# Patient Record
Sex: FEMALE | Race: WHITE | NOT HISPANIC OR LATINO | Employment: FULL TIME
[De-identification: names, ages, dates, MRNs, and addresses within clinical notes are randomized per-mention and may not be internally consistent; named-entity substitution may affect disease eponyms.]

---

## 2017-04-07 DIAGNOSIS — G35 MULTIPLE SCLEROSIS (H): ICD-10-CM

## 2017-04-07 NOTE — TELEPHONE ENCOUNTER
Received faxed refill request for Rebif from [facility]; Patient was last seen in February 2016 and has no follow up appointment with Dr. Sheffield; Refilled for 6 months per [place] refill protocol. Will have clinic coordinators call to schedule appointment.    Roxana Hooks MS RN Care Coordinator

## 2017-08-19 ENCOUNTER — HEALTH MAINTENANCE LETTER (OUTPATIENT)
Age: 37
End: 2017-08-19

## 2017-08-24 ENCOUNTER — OFFICE VISIT (OUTPATIENT)
Dept: PODIATRY | Facility: CLINIC | Age: 37
End: 2017-08-24
Payer: COMMERCIAL

## 2017-08-24 VITALS
WEIGHT: 180 LBS | DIASTOLIC BLOOD PRESSURE: 88 MMHG | SYSTOLIC BLOOD PRESSURE: 136 MMHG | BODY MASS INDEX: 33.99 KG/M2 | HEIGHT: 61 IN

## 2017-08-24 DIAGNOSIS — M79.671 BILATERAL FOOT PAIN: Primary | ICD-10-CM

## 2017-08-24 DIAGNOSIS — M79.672 BILATERAL FOOT PAIN: Primary | ICD-10-CM

## 2017-08-24 PROCEDURE — 99203 OFFICE O/P NEW LOW 30 MIN: CPT | Performed by: PODIATRIST

## 2017-08-24 NOTE — PATIENT INSTRUCTIONS
DR. WEBER'S CLINIC LOCATIONS:   MONDAY - [place]  TUESDAY - [place]   [address] [facility]  [address]   [place] [address] [place] [address]   [phone]       THURSDAY AM - [place] [facility]   [address]   [place] [address] [place], [place] [address] [phone]       FRIDAY AM - [place] SET UP SURGERY: [phone] [address] APPOINTMENTS: [phone]   [place] [address] AFTER HOURS: [phone] FAX NUMBER: [phone]     Follow Up: 3 wks    PRICE THERAPY    Many aches and pains throughout the foot and ankle can be helped with many simple treatments. This is usually described as PRICE Therapy.      P - Protection - often times, inflammation/pain in the lower extremity is not able to improve simply because the areas involved are never allowed to rest. Every step we take can bother the problematic area. Protecting those areas is an important step in the healing process. This may involve a walking cast boot, a special insert/orthotic device, an ankle brace, or simply avoiding barefoot walking.    R - Rest - in addition to protecting the foot/ankle, resting is an important, but often times difficult, treatment option. Getting off your feet when they bother you, and specifically avoiding activities that cause pain/discomfort, are very beneficial to prevent, and treat, foot/ankle pain.      I - Ice - icing regularly can help to decrease inflammation and swelling in the foot, thus decreasing pain. Using an ice pack or a bag of frozen veggies works very well. Ice for 20 minutes multiple times per day as needed.  Do not place the ice directly on the skin as this can cause tissue damage.    C - Compression - using a compression wrap or an ACE wrap can help to decrease swelling, which can help to decrease pain. Wearing the wraps is generally not needed at night, but they should be worn on a  regular basis when you are going to be on your feet for prolonged periods as gravity tends to pull fluids down to your feet/ankles.    E - Elevation - elevating your lower extremities multiple times daily for 15-20 minutes can help to decrease swelling, which works well in decreasing pain levels.    NSAID/Tylenol - Anti-inflammatories like Aleve or ibuprofen, and/or a pain medication, such as Tylenol, can help to improve pain levels and get the issue resolved sooner rather than later. Anyone with liver issues should be careful with Tylenol, and anyone with high blood pressure or heart, stomach or kidney issues should be careful with anti-inflammatories. Please ask if you have questions about these medications, including dosage.      Over the Counter Inserts    Super Feet are the most common and easiest to find.    Locations include any Pocket Change Card, takokat in [place] on [address]  and in [place] on [address], [employer] in [facility] on [facility], [facility] Running Room in [facility] on [address], [facility] Running Room in [place] on [address] [facility] [address] [place] [address], Scary Mommy in [place] on [address] and [employer] Sport Shop in [facility] on [place] and in [place] on [facility].    Sofsole Fit can be found at takokat in [place].  You can also find them online at Sofsole.com    Spenco can be found online and at [employer] in [facility] on [address], Run N' Fun in [facility] on [place], Gear Running Store in [place] on [facility], [employer] in [place] on [address] and [employer] in [place] on [address].    Power Step can be a little harder to find.  Locations include [employer]' Fun in [place] on [address], [place] in [facility], Stop-over Store in [place] on [address] and online    Walk-Fit - Target     [facility]    **  A good high quality over the counter insert can cost around  $40-$50.              Body Mass Index (BMI)  Many things can cause foot and ankle problems. Foot structure, activity level, foot mechanics and injuries are common causes of pain. One very important issue that often goes unmentioned, is body weight. Extra weight can cause increased stress on muscles, ligaments, bones and tendons. Sometimes just a few extra pounds is all it takes to put one over her/his threshold. Without reducing that stress, it can be difficult to alleviate pain. Some people are uncomfortable addressing this issue, but we feel it is important for you to think about it. As Foot &  Ankle specialists, our job is addressing the lower extremity problem and possible causes. Regarding extra body weight, we encourage patients to discuss diet and weight management plans with their primary care doctors. It is this team approach that gives you the best opportunity for pain relief and getting you back on your feet.

## 2017-08-24 NOTE — MR AVS SNAPSHOT
After Visit Summary   8/24/2017    Alejandra Forbes    MRN: [id number]           Patient Information     Date Of Birth          1980        Visit Information        Provider Department      8/24/2017 3:15 PM Lane Serrato DPM [facility]        Care Instructions      DR. SERRATO'S CLINIC LOCATIONS:   MONDAY - [place] TUESDAY - [place]   [address] [facility]  [address]   [place] [address] [place] [address]   [phone]       THURSDAY AM - [place] THURSDAY PM - [facility]   [address]   [place] [address] [place], [place] [address] [phone]       FRIDAY AM - [place] SET UP SURGERY: [phone] [address] APPOINTMENTS: [phone]   [place] [address] AFTER HOURS: [phone] FAX NUMBER: [phone]     Follow Up: 3 wks    PRICE THERAPY    Many aches and pains throughout the foot and ankle can be helped with many simple treatments. This is usually described as PRICE Therapy.      P - Protection - often times, inflammation/pain in the lower extremity is not able to improve simply because the areas involved are never allowed to rest. Every step we take can bother the problematic area. Protecting those areas is an important step in the healing process. This may involve a walking cast boot, a special insert/orthotic device, an ankle brace, or simply avoiding barefoot walking.    R - Rest - in addition to protecting the foot/ankle, resting is an important, but often times difficult, treatment option. Getting off your feet when they bother you, and specifically avoiding activities that cause pain/discomfort, are very beneficial to prevent, and treat, foot/ankle pain.      I - Ice - icing regularly can help to decrease inflammation and swelling in the foot, thus decreasing pain. Using an ice pack or a bag of frozen veggies works very well. Ice for 20 minutes multiple times  per day as needed.  Do not place the ice directly on the skin as this can cause tissue damage.    C - Compression - using a compression wrap or an ACE wrap can help to decrease swelling, which can help to decrease pain. Wearing the wraps is generally not needed at night, but they should be worn on a regular basis when you are going to be on your feet for prolonged periods as gravity tends to pull fluids down to your feet/ankles.    E - Elevation - elevating your lower extremities multiple times daily for 15-20 minutes can help to decrease swelling, which works well in decreasing pain levels.    NSAID/Tylenol - Anti-inflammatories like Aleve or ibuprofen, and/or a pain medication, such as Tylenol, can help to improve pain levels and get the issue resolved sooner rather than later. Anyone with liver issues should be careful with Tylenol, and anyone with high blood pressure or heart, stomach or kidney issues should be careful with anti-inflammatories. Please ask if you have questions about these medications, including dosage.      Over the Counter Inserts    Super Feet are the most common and easiest to find.    Locations include any [employer], [employer] in [place] on [place] [address] and in [place] on [facility] [address], [employer] Resources in \[facility]\"" on [facility], [facility] Running Room in \[facility]\"" on [address], [facility] Running Room in [place] on [address], [employer] in [place] on [address] and [employer] Sport Shop in \[facility]\"" on [place] and in [place] on [address].    Sofsole Fit can be found at [employer] in [place].  You can also find them online at Sofsole.com    Spenco can be found online and at Mod[employer]ir[employer] Shoe Shop in \[facility]\"" on [address], Run N' Fun in [facility] on [address], Gear Running Store in [place] on [facility], [employer] in [place] on [address] and [employer] in [place] on [address].    Power Step can be a little  harder to find.  Locations include Run N' Fun in [place] on [address], [place] in [employer], Stop-over Store in [place] on [address] and online    Walk-Fit - Target     [facility] - [facility]    **  A good high quality over the counter insert can cost around $40-$50.              Body Mass Index (BMI)  Many things can cause foot and ankle problems. Foot structure, activity level, foot mechanics and injuries are common causes of pain. One very important issue that often goes unmentioned, is body weight. Extra weight can cause increased stress on muscles, ligaments, bones and tendons. Sometimes just a few extra pounds is all it takes to put one over her/his threshold. Without reducing that stress, it can be difficult to alleviate pain. Some people are uncomfortable addressing this issue, but we feel it is important for you to think about it. As Foot &  Ankle specialists, our job is addressing the lower extremity problem and possible causes. Regarding extra body weight, we encourage patients to discuss diet and weight management plans with their primary care doctors. It is this team approach that gives you the best opportunity for pain relief and getting you back on your feet.            Follow-ups after your visit        Who to contact     If you have questions or need follow up information about today's clinic visit or your schedule please contact [facility] directly at [phone].  Normal or non-critical lab and imaging results will be communicated to you by MyChart, letter or phone within 4 business days after the clinic has received the results. If you do not hear from us within 7 days, please contact the clinic through MyChart or phone. If you have a critical or abnormal lab result, we will notify you by phone as soon as possible.  Submit refill requests through [employer] or call your pharmacy and they will forward the refill request to us. Please allow 3 business days for your  "refill to be completed.          Additional Information About Your Visit        [employer] Information     [employer] lets you send messages to your doctor, view your test results, renew your prescriptions, schedule appointments and more. To sign up, go to [email or web address] . Click on \"Log in\" on the left side of the screen, which will take you to the Welcome page. Then click on \"Sign up Now\" on the right side of the page.     You will be asked to enter the access code listed below, as well as some personal information. Please follow the directions to create your username and password.     Your access code is: [email or web address]  Expires: 2017  3:41 PM     Your access code will  in 90 days. If you need help or a new code, please call your [place] clinic or [phone].        Care EveryWhere ID     This is your Care EveryWhere ID. This could be used by other organizations to access your [facility] medical records  [id number]        Your Vitals Were     Height BMI (Body Mass Index)                5' 1\" (1.549 m) 34.01 kg/m2           Blood Pressure from Last 3 Encounters:   16 143/90   16 (!) 135/92   08/28/15 116/68    Weight from Last 3 Encounters:   17 180 lb (81.6 kg)   16 180 lb (81.6 kg)   16 180 lb (81.6 kg)              Today, you had the following     No orders found for display       Primary Care Provider Office Phone # Fax #    Shivani Clemons [phone] [address] [facility] [address]        Equal Access to Services     [facility]: Hadii deshawn Valerio, дмитрий thomas, qarandolphta nenaalnilo williamson. So [facility] [phone].    ATENCIÓN: Si habla español, tiene a murphy disposición servicios gratuitos de asistencia lingüística. Llame al [phone].    We comply with applicable federal civil rights laws and [place] laws. We do not discriminate on the basis of race, color, national " origin, age, disability sex, sexual orientation or gender identity.            Thank you!     Thank you for choosing [facility]  for your care. Our goal is always to provide you with excellent care. Hearing back from our patients is one way we can continue to improve our services. Please take a few minutes to complete the written survey that you may receive in the mail after your visit with us. Thank you!             Your Updated Medication List - Protect others around you: Learn how to safely use, store and throw away your medicines at www.disposemymeds.org.          This list is accurate as of: 8/24/17  3:41 PM.  Always use your most recent med list.                   Brand Name Dispense Instructions for use Diagnosis    buPROPion 150 MG 24 hr tablet    WELLBUTRIN XL    30 tablet    Take 300 mg by mouth every morning    Bipolar 2 disorder (H)       cholecalciferol 1000 UNIT tablet    vitamin D    90 tablet    Take one tablet daily, starting after you have finished the once a week dosing of the 50,000 Units dose.    MS (multiple sclerosis) (H)       ibuprofen 200 MG tablet    ADVIL/MOTRIN     Take 2-3 tablets by mouth as needed        Interferon Beta-1a 44 MCG/0.5ML Soaj     12 Syringe    Inject 44 mcg Subcutaneous three times a week    Multiple sclerosis (H)       lithium 300 MG CR tablet    ESKALITH/LITHOBID     Take 1 tablet (300 mg) by mouth 2 times daily        metFORMIN 500 MG 24 hr tablet    GLUCOPHAGE-XR    180 tablet    TAKE 2 TABLETS BY MOUTH DAILY WITH A MEAL    Hyperglycemia, drug-induced       norgestrel-ethinyl estradiol 0.3-30 MG-MCG per tablet    LO/OVRAL    90 tablet    Take 1 tablet by mouth daily    Other general counseling and advice for contraceptive management       order for DME     1 Device    Equipment being ordered: nebulizer with tubing    Wheezing       zolpidem 5 MG tablet    AMBIEN    30 tablet    Take 1 tablet (5 mg) by mouth nightly as needed for sleep    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

## 2017-08-24 NOTE — NURSING NOTE
"Chief Complaint   Patient presents with     Foot Problems     BL foot and ankle pain x many years       Initial /88  Ht 5' 1\" (1.549 m)  Wt 180 lb (81.6 kg)  BMI 34.01 kg/m2 Estimated body mass index is 34.01 kg/(m^2) as calculated from the following:    Height as of this encounter: 5' 1\" (1.549 m).    Weight as of this encounter: 180 lb (81.6 kg).  Medication Reconciliation: complete  "

## 2017-09-01 NOTE — PROGRESS NOTES
"Foot & Ankle Surgery  August 24th, 2017    CC: bilateral foot pain    I was asked to see Alejandra Forbes regarding the chief complaint by:  self    HPI:  Pt is a 37 year old female who presents with above complaint.  Bilateral \"pain in both left and right foot, ankle and top\" for \"several years\".  Describes ache, sometimes a sharp pain or stiffness.  Pain 4/10 \"daily\", jesus with \"over use, it usually hurts more after sitting or not moving for a while\".  Has tried ibuprofen which did help.  symptoms R>L.  Describes \"tightness\" in her feet.    ROS:   Pos for CC.  The patient denies current nausea, vomiting, chills, fevers, belly pain, calf pain, chest pain or SOB.  Complete remainder of ROS is otherwise neg.    VITALS:    Vitals:    08/24/17 1521   BP: 136/88   Weight: 180 lb (81.6 kg)   Height: 5' 1\" (1.549 m)       PMH:    Past Medical History:   Diagnosis Date     Allergic rhinitis, cause unspecified     no meds taken, rare flonase     Anxiety attack 8/28/2015     Asthma, exercise induced 3/1/2013     Intermittent asthma 3/1/2013    UPPER RESPIRATIRY TRACT INFECTION induced      Mild intermittent asthma     Exercise induced, & cold -better w/ weight loss, albuterol use rare     Pars planitis     steroid inj txt gave glaucoma, surgery to treat gave too low pressures and optive nerve swelling- needs more surgery     Polycystic ovaries 2003    Better on OCPs and metformin for insulin resistance, last a1c 5.7     Posterior subcapsular polar cataract, nonsenile     steroid induced.     Steroid responders      Uveitis        SXHX:    Past Surgical History:   Procedure Laterality Date     C NONSPECIFIC PROCEDURE  4/06, 7/06    glaucoma surgery of both eyes due to steroid txt of pars planaris     CATARACT IOL, RT/LT Left 2006     GLAUCOMA SURGERY Bilateral 2006     YAG CAPSULOTOMY OS (LEFT EYE) Left 09/13        MEDS:    Current Outpatient Prescriptions   Medication     Interferon Beta-1a 44 MCG/0.5ML SOAJ     metFORMIN " (GLUCOPHAGE-XR) 500 MG 24 hr tablet     lithium (ESKALITH/LITHOBID) 300 MG CR tablet     buPROPion (WELLBUTRIN XL) 150 MG 24 hr tablet     zolpidem (AMBIEN) 5 MG tablet     ORDER FOR DME     ibuprofen (ADVIL,MOTRIN) 200 MG tablet     norgestrel-ethinyl estradiol (LO/OVRAL) 0.3-30 MG-MCG per tablet     cholecalciferol (VITAMIN D) 1000 UNIT tablet     No current facility-administered medications for this visit.        ALL:     Allergies   Allergen Reactions     Nkda [No Known Drug Allergies]        FMH:    Family History   Problem Relation Age of Onset     DIABETES Father      Severe Type 2     Alcohol/Drug Father      Allergies Father      Depression Father      Bipolar     Obesity Father      Blood Disease Father      passed away from septic shock in 6/07     DIABETES Paternal Grandfather      Type 2     Depression Paternal Grandfather      Allergies Brother      Eye Disorder Paternal Grandmother      Glaucoma, Cataracts     OSTEOPOROSIS Paternal Grandmother      Glaucoma Paternal Grandmother      Lipids Maternal Grandmother      CANCER Paternal Aunt      lung, smoker     Breast Cancer Mother 60     Macular Degeneration No family hx of        SocHx:    Social History     Social History     Marital status:      Spouse name: N/A     Number of children: 0     Years of education: Bachelors+     Occupational History     , Director Of Orientation      [employer]     Social History Main Topics     Smoking status: Never Smoker     Smokeless tobacco: Never Used     Alcohol use No      Comment: 0-1 Drinks Per Week     Drug use: No     Sexual activity: Yes     Partners: Female, Male     Birth control/ protection: Condom, Pill      Comment: Safe, stable relationship, male- on lo-ovral     Other Topics Concern      Service No     Blood Transfusions No     Caffeine Concern No     Occupational Exposure No     Hobby Hazards No     Sleep Concern No     Stress Concern No     Weight Concern Yes     Special  Diet No     Back Care No     Exercise Yes     Bike Helmet Yes     Seat Belt Yes     Self-Exams No     Social History Narrative    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    Mutually monogamous relationship (chris kebede teacher), getting  June 2014.    Shivani Clemons                 3/1/2013 4:38 PM                Social Documentation:        Balanced Diet: YES    Calcium intake: more than 2 per day    Caffeine: 10 cups per day    Exercise:  type of activity just joined [facility];  3 times per week    Sunscreen: Yes    Seatbelts:  Yes    Self Breast Exam:  No    Self Testicular Exam: n/a    Physical/Emotional/Sexual Abuse: No    Do you feel safe in your environment? Yes        Cholesterol screen up to date: No    CHOL      163   4/17/06    HDL       36   4/17/06    LDL      109   4/17/06    TRIG       86   4/17/06    CHOLHDLRATIO      4.5   4/17/06        Eye Exam up to date: Would like to discuss, having a lot of issues    Dental Exam up to date: No    Pap smear up to date: Yes-doing today    Mammogram up to date: Does Not Apply    Dexa Scan up to date: Does Not Apply    Colonoscopy up to date: Does Not Apply    Immunizations up to date: Yes-td 2005    Glucose screen if over 40:  No                                   EXAMINATION:  Gen:   No apparent distress  Neuro:   A&Ox3, no deficits  Psych:    Answering questions appropriately for age and situation with normal affect  Head:    NCAT  Eye:    Visual scanning without deficit  Ear:    Response to auditory stimuli wnl  Lung:    Non-labored breathing on RA noted  Abd:    NTND per patient report  Lymph:    Neg for pitting/non-pitting edema BLE  Vasc:    Pulses palpable, CFT minimally delayed  Neuro:    Light touch sensation intact to all sensory nerve distributions without paresthesias  Derm:    Neg for nodules, lesions or ulcerations  MSK:    Unable to elicit any pain today.  No met, intermet, sinus tarsi, midfoot or MPJ pain on  exam. WB exam shows calcaneal valgus with decrease in arch height bilateral   Calf:    Neg for redness, swelling or tenderness    Assessment:  37 year old female with bilateral foot pain, although unable to elicit specific symptoms today      Plan:  Discussed etiologies and options  1.  Bilateral foot pain  -comfortable shoes, minimize shoeless ambulation  -OTC insert  -RICE/NSAID prn    Follow up:  3 weeks or sooner with acute issues      Patient's medical history was reviewed today    Body mass index is 34.01 kg/(m^2).  Weight management plan: Patient was referred to their PCP to discuss a diet and exercise plan.        Lane Serrato DPM   Podiatric Foot & Ankle Surgeon  [facility]  [phone]

## 2017-10-31 ENCOUNTER — OFFICE VISIT (OUTPATIENT)
Dept: OPHTHALMOLOGY | Facility: CLINIC | Age: 37
End: 2017-10-31
Attending: OPHTHALMOLOGY
Payer: COMMERCIAL

## 2017-10-31 DIAGNOSIS — H35.353 CYSTOID MACULAR EDEMA OF BOTH EYES: Primary | ICD-10-CM

## 2017-10-31 DIAGNOSIS — G35: ICD-10-CM

## 2017-10-31 DIAGNOSIS — H30.23: ICD-10-CM

## 2017-10-31 DIAGNOSIS — H40.43X4 UVEITIC GLAUCOMA OF BOTH EYES, INDETERMINATE STAGE: ICD-10-CM

## 2017-10-31 DIAGNOSIS — H20.9 UVEITIC GLAUCOMA OF BOTH EYES, INDETERMINATE STAGE: ICD-10-CM

## 2017-10-31 PROCEDURE — 92133 CPTRZD OPH DX IMG PST SGM ON: CPT | Mod: ZF | Performed by: INTERNAL MEDICINE

## 2017-10-31 PROCEDURE — 92015 DETERMINE REFRACTIVE STATE: CPT | Mod: ZF

## 2017-10-31 PROCEDURE — 99214 OFFICE O/P EST MOD 30 MIN: CPT | Mod: ZF

## 2017-10-31 PROCEDURE — 92134 CPTRZ OPH DX IMG PST SGM RTA: CPT | Mod: ZF | Performed by: INTERNAL MEDICINE

## 2017-10-31 ASSESSMENT — CUP TO DISC RATIO
OD_RATIO: 0.2
OS_RATIO: 0.2

## 2017-10-31 ASSESSMENT — CONF VISUAL FIELD
METHOD: COUNTING FINGERS
OD_NORMAL: 1
OS_NORMAL: 1

## 2017-10-31 ASSESSMENT — SLIT LAMP EXAM - LIDS
COMMENTS: NORMAL
COMMENTS: NORMAL

## 2017-10-31 ASSESSMENT — REFRACTION_MANIFEST
OS_CYLINDER: +5.00
OS_SPHERE: -2.75
OD_SPHERE: -3.50
OD_ADD: +1.00
OS_AXIS: 090
OD_AXIS: 005
OD_CYLINDER: +0.50
OS_ADD: +2.50

## 2017-10-31 ASSESSMENT — TONOMETRY
IOP_METHOD: TONOPEN
OD_IOP_MMHG: 15
OS_IOP_MMHG: 15

## 2017-10-31 ASSESSMENT — EXTERNAL EXAM - RIGHT EYE: OD_EXAM: NORMAL

## 2017-10-31 ASSESSMENT — VISUAL ACUITY
OS_SC: 20/200
OD_PH_SC: 20/50-2
OS_PH_SC: 20/50+2
METHOD: SNELLEN - LINEAR
OD_SC: 20/125

## 2017-10-31 ASSESSMENT — EXTERNAL EXAM - LEFT EYE: OS_EXAM: NORMAL

## 2017-10-31 NOTE — PROGRESS NOTES
HPI  Alejandra Forbes is a 37 year old female with history of multiple sclerosis with history of uveitis, left eye who presents today for CEE and refraction. Patient reports she broke her glasses over the weekend which is why she is requesting a new pair. She denies change in vision. Eyes are comfortable without pain.       Medications:  No ocular medications  Interferon gamma     Testing:   OCT Macula both eyes:  OD mild epiretinal membrane with some mild retinal distortion.   OS: central cystoid macular edema,mild epiretinal membrane    Assessment & Plan    (H20.9) Anterior uveitis  (primary encounter diagnosis)  (H30.22) Pars planitis of left eye    1) Intermediate uveitis left eye   -Patient with new cystoid macular edema in her left eye today on testing. Not seen on previous testing 2015.   - previous history of ocular steroid and oral prednisone use with steroid response.     Plan:   -Discussed findings of new intraretinal swelling with patient and options for treatment. Patient not interested in treatment at this time as vision is stable (20/30) left eye. She would like to observe in setting of side effects.   -Observe, return to clinic 3 months with repeat oct macula to check on edema         2)Uveitic vs steroid responder Glaucoma -- s/p Trab OU in 2006 (Dr. Edward), +steroid responder -- K pachy:    Tmax: 60s per patient (on steroid prior to Trab OU)    HVF: Full in 2009     CDR:     HRT/OCT: RNFL WNL In 2015      FHX of Glc: Yes, grandmother on gtts     Gonio:       Intolerant to:      Asthma/COPD: Yes, on inhalers  Steroid Use: Yes, for CME    Kidney Stones:  No   Sulfa Allergy:  No    IOP targets:  -IOP in good range s/p trabeculectomy both eyes     3)PCIOL left eye   4)PSC OD- visually significant patient would like to observe.   6)Multiple Sclerosis -- following with Neurology  7)H/O ERM -- following with Dr. Xu YANG  Ophthalmology PGY3    Attending Physician Attestation:  Complete  documentation of historical and exam elements from today's encounter can be found in the full encounter summary report (not reduplicated in this progress note).  I personally obtained the chief complaint(s) and history of present illness.  I confirmed and edited as necessary the review of systems, past medical/surgical history, family history, social history, and examination findings as documented by others; and I examined the patient myself.  I personally reviewed the relevant tests, images, and reports as documented above.  I formulated and edited as necessary the assessment and plan and discussed the findings and management plan with the patient and family. Attending Physician Image/Tesing Attestation: I personally reviewed the ophthalmic test(s) associated with this encounter, agree with the interpretation(s) as documented by the resident/fellow, and have edited the corresponding report(s) as necessary.  . - Kevin Deleon MD

## 2017-10-31 NOTE — NURSING NOTE
Chief Complaints and History of Present Illnesses   Patient presents with     Uveitis Follow Up     Glaucoma Follow Up     HPI    Affected eye(s):  Both   Symptoms:     Blurred vision   No decreased vision   No floaters   No flashes   No redness   No Dryness         Do you have eye pain now?:  No      Comments:  Last eye exam was here. No recent changes or complaints. Needs new glasses. No drops.   Sabina SMITH October 31, 2017 8:20 AM  s

## 2017-10-31 NOTE — MR AVS SNAPSHOT
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      Your next 10 appointments already scheduled     Feb 02, 2018  8:00 AM CST   RETURN GENERAL with Danya Pagan MD   Eye Clinic ([facility])    [address] [address]  [address] [facility]  9[address] Clin [address] [place] [id number] [phone]              Who to contact     Please call your clinic at [phone] to:    Ask questions about your health    Make or cancel appointments    Discuss your medicines    Learn about your test results    Speak to your doctor   If you have compliments or concerns about an experience at your clinic, or if you wish to file a complaint, please contact [facility] Patient Relations at [phone] or email us at [email or web address]         Additional Information About Your Visit        MyChart Information     Conelum is an electronic gateway that provides easy, online access to your medical records. With Conelum, you can request a clinic appointment, read your test results, renew a prescription or communicate with your care team.     To sign up for GetFresht visit the website at [email or web address]   You will be asked to enter the access code listed below, as well as some personal information. Please follow the directions to create your username and password.     Your access code is: [email or web address]  Expires: 11/22/2017   3:41 PM     Your access code will  in 90 days. If you need help or a new code, please contact your [facility] or call [phone] for assistance.        Care EveryWhere ID     This is your Care EveryWhere ID. This could be used by other organizations to access your [facility] medical records  [id number]         Blood Pressure from Last 3 Encounters:   17 136/88   16 143/90   16 (!) 135/92    Weight from Last 3 Encounters:   17 81.6 kg (180 lb)   16 81.6 kg (180 lb)   16 81.6 kg (180 lb)              We Performed the Following     OCT Optic Nerve RNFL Spectralis OU (both eyes)     OCT Retina Spectralis OU (both eyes)        Primary Care Provider Office Phone # Fax #    Shivani Nuha Clemons [phone] [address] [facility] [address]        Equal Access to Services     DAVION BLUM : Hadii aad ku hadasho Soomaali, waaxda luqadaha, qaybta kaalmada adeegyada, waxay idiin hayaan ceasar ramos . So [facility] [phone].    ATENCIÓN: Si habla español, tiene a murphy disposición servicios gratuitos de asistencia lingüística. Llame al [phone].    We comply with applicable federal civil rights laws and [place] laws. We do not discriminate on the basis of race, color, national origin, age, disability, sex, sexual orientation, or gender identity.            Thank you!     Thank you for choosing EYE CLINIC  for your care. Our goal is always to provide you with excellent care. Hearing back from our patients is one way we can continue to improve our services. Please take a few minutes to complete the written survey that you may receive in the mail after your visit with us. Thank you!             Your Updated Medication List - Protect others around you: Learn how to safely use, store and throw away your medicines at www.disposemymeds.org.          This list is accurate as of: 10/31/17 11:59 PM.  Always use your most  recent med list.                   Brand Name Dispense Instructions for use Diagnosis    buPROPion 150 MG 24 hr tablet    WELLBUTRIN XL    30 tablet    Take 300 mg by mouth every morning    Bipolar 2 disorder (H)       cholecalciferol 1000 UNIT tablet    vitamin D3    90 tablet    Take one tablet daily, starting after you have finished the once a week dosing of the 50,000 Units dose.    MS (multiple sclerosis) (H)       ibuprofen 200 MG tablet    ADVIL/MOTRIN     Take 2-3 tablets by mouth as needed        Interferon Beta-1a 44 MCG/0.5ML Soaj     12 Syringe    Inject 44 mcg Subcutaneous three times a week    Multiple sclerosis (H)       lithium 300 MG CR tablet    ESKALITH/LITHOBID     Take 1 tablet (300 mg) by mouth 2 times daily        metFORMIN 500 MG 24 hr tablet    GLUCOPHAGE-XR    180 tablet    TAKE 2 TABLETS BY MOUTH DAILY WITH A MEAL    Hyperglycemia, drug-induced       norgestrel-ethinyl estradiol 0.3-30 MG-MCG per tablet    LO/OVRAL    90 tablet    Take 1 tablet by mouth daily    Other general counseling and advice for contraceptive management       order for DME     1 Device    Equipment being ordered: nebulizer with tubing    Wheezing       zolpidem 5 MG tablet    AMBIEN    30 tablet    Take 1 tablet (5 mg) by mouth nightly as needed for sleep    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

## 2017-12-22 DIAGNOSIS — T50.905A HYPERGLYCEMIA, DRUG-INDUCED: ICD-10-CM

## 2017-12-22 DIAGNOSIS — E28.2 POLYCYSTIC OVARIES: ICD-10-CM

## 2017-12-22 DIAGNOSIS — R73.9 HYPERGLYCEMIA, DRUG-INDUCED: ICD-10-CM

## 2017-12-26 RX ORDER — METFORMIN HCL 500 MG
TABLET, EXTENDED RELEASE 24 HR ORAL
Qty: 30 TABLET | Refills: 0 | Status: CANCELLED | OUTPATIENT
Start: 2017-12-26

## 2017-12-26 RX ORDER — METFORMIN HCL 500 MG
TABLET, EXTENDED RELEASE 24 HR ORAL
Start: 2017-12-26

## 2018-01-02 NOTE — TELEPHONE ENCOUNTER
Routing refill request to provider for review/approval because:  A break in medication  See below messages  Cherelle ADAN RN    Next 5 appointments (look out 90 days)     Jan 09, 2018  4:00 PM CST   PHYSICAL with Shivani Clemons MD   [facility]    [address]  [facility] [address] [phone]

## 2018-01-03 RX ORDER — METFORMIN HCL 500 MG
TABLET, EXTENDED RELEASE 24 HR ORAL
Qty: 60 TABLET | Refills: 0 | Status: SHIPPED | OUTPATIENT
Start: 2018-01-03 | End: 2018-01-15

## 2018-01-15 ENCOUNTER — OFFICE VISIT (OUTPATIENT)
Dept: FAMILY MEDICINE | Facility: CLINIC | Age: 38
End: 2018-01-15
Payer: COMMERCIAL

## 2018-01-15 VITALS
SYSTOLIC BLOOD PRESSURE: 145 MMHG | BODY MASS INDEX: 44.48 KG/M2 | WEIGHT: 235.6 LBS | DIASTOLIC BLOOD PRESSURE: 92 MMHG | HEIGHT: 61 IN | TEMPERATURE: 98 F | HEART RATE: 77 BPM | OXYGEN SATURATION: 100 %

## 2018-01-15 DIAGNOSIS — R03.0 ELEVATED BLOOD PRESSURE READING WITHOUT DIAGNOSIS OF HYPERTENSION: ICD-10-CM

## 2018-01-15 DIAGNOSIS — R73.9 HYPERGLYCEMIA, DRUG-INDUCED: ICD-10-CM

## 2018-01-15 DIAGNOSIS — F31.81 BIPOLAR 2 DISORDER (H): ICD-10-CM

## 2018-01-15 DIAGNOSIS — F45.0 SEVERE SOMATOFORM DISORDER: ICD-10-CM

## 2018-01-15 DIAGNOSIS — T50.905A HYPERGLYCEMIA, DRUG-INDUCED: ICD-10-CM

## 2018-01-15 DIAGNOSIS — Z00.00 ROUTINE GENERAL MEDICAL EXAMINATION AT A HEALTH CARE FACILITY: Primary | ICD-10-CM

## 2018-01-15 DIAGNOSIS — Z12.4 SCREENING FOR CERVICAL CANCER: ICD-10-CM

## 2018-01-15 DIAGNOSIS — H30.23: ICD-10-CM

## 2018-01-15 DIAGNOSIS — G35 MULTIPLE SCLEROSIS (H): ICD-10-CM

## 2018-01-15 DIAGNOSIS — Z23 NEED FOR VACCINATION: ICD-10-CM

## 2018-01-15 DIAGNOSIS — G35: ICD-10-CM

## 2018-01-15 LAB
ERYTHROCYTE [DISTWIDTH] IN BLOOD BY AUTOMATED COUNT: 13.4 % (ref 10–15)
HBA1C MFR BLD: 7.1 % (ref 4.3–6)
HCT VFR BLD AUTO: 46.4 % (ref 35–47)
HGB BLD-MCNC: 15 G/DL (ref 11.7–15.7)
MCH RBC QN AUTO: 29.7 PG (ref 26.5–33)
MCHC RBC AUTO-ENTMCNC: 32.3 G/DL (ref 31.5–36.5)
MCV RBC AUTO: 92 FL (ref 78–100)
PLATELET # BLD AUTO: 293 10E9/L (ref 150–450)
RBC # BLD AUTO: 5.05 10E12/L (ref 3.8–5.2)
WBC # BLD AUTO: 8.4 10E9/L (ref 4–11)

## 2018-01-15 PROCEDURE — 99213 OFFICE O/P EST LOW 20 MIN: CPT | Mod: 25 | Performed by: FAMILY MEDICINE

## 2018-01-15 PROCEDURE — 80053 COMPREHEN METABOLIC PANEL: CPT | Performed by: FAMILY MEDICINE

## 2018-01-15 PROCEDURE — 90686 IIV4 VACC NO PRSV 0.5 ML IM: CPT | Performed by: FAMILY MEDICINE

## 2018-01-15 PROCEDURE — 36415 COLL VENOUS BLD VENIPUNCTURE: CPT | Performed by: FAMILY MEDICINE

## 2018-01-15 PROCEDURE — 99395 PREV VISIT EST AGE 18-39: CPT | Mod: 25 | Performed by: FAMILY MEDICINE

## 2018-01-15 PROCEDURE — 90471 IMMUNIZATION ADMIN: CPT | Performed by: FAMILY MEDICINE

## 2018-01-15 PROCEDURE — 84443 ASSAY THYROID STIM HORMONE: CPT | Performed by: FAMILY MEDICINE

## 2018-01-15 PROCEDURE — 82306 VITAMIN D 25 HYDROXY: CPT | Performed by: FAMILY MEDICINE

## 2018-01-15 PROCEDURE — 85027 COMPLETE CBC AUTOMATED: CPT | Performed by: FAMILY MEDICINE

## 2018-01-15 PROCEDURE — 83036 HEMOGLOBIN GLYCOSYLATED A1C: CPT | Performed by: FAMILY MEDICINE

## 2018-01-15 NOTE — MR AVS SNAPSHOT
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     Discuss preventive care.      Review your medicines if you your doctor has prescribed any.    Until age 30: Get a Pap test every three years (more often if you have had an abnormal result).    After age 30: Talk to your doctor about whether you should have a Pap test every 3 years or have a Pap test with HPV screening every 5 years.   You do not need a Pap test if your uterus was removed (hysterectomy) and you have not had cancer.  You should be tested each year for STDs (sexually transmitted diseases), if you're at risk.   Talk to your provider about how often to have your cholesterol checked.  If you are at risk for diabetes, you should have a diabetes test (fasting glucose).  Shots: Get a flu shot each year. Get a tetanus shot every 10 years.   Nutrition:     Eat at least 5 servings of fruits and vegetables each day.    Eat whole-grain bread, whole-wheat pasta and brown rice instead of white grains and rice.    Talk to your provider about Calcium and Vitamin D.     Lifestyle    Exercise at least 150 minutes a week (30 minutes a day, 5 days of the week). This will help  you control your weight and prevent disease.    Limit alcohol to one drink per day.    No smoking.     Wear sunscreen to prevent skin cancer.    See your dentist every six months for an exam and cleaning.            Follow-ups after your visit        Additional Services     MENTAL HEALTH REFERRAL  - Adult; Psychiatry and Medication Management; Psychiatry; [facility]: Psychiatry Clinic [phone]; We will contact you to schedule the appointment or please call with any questions       All scheduling is subject to the client's specific insurance plan & benefits, provider/location availability, and provider clinical specialities.  Please arrive 15 minutes early for your first appointment and bring your completed paperwork.    Please be aware that coverage of these services is subject to the terms and limitations of your health insurance plan.  Call member services at your health plan with any benefit or coverage questions.                            Your next 10 appointments already scheduled     Feb 02, 2018  8:00 AM CST   RETURN GENERAL with Danya Pagan MD   Eye Clinic ([facility])    [facility]  [address] [facility]  [address] [address] [place] [id number] [phone]              Who to contact     If you have questions or need follow up information about today's clinic visit or your schedule please contact [facility] directly at [phone].  Normal or non-critical lab and imaging results will be communicated to you by MyChart, letter or phone within 4 business days after the clinic has received the results. If you do not hear from us within 7 days, please contact the clinic through MyChart or phone. If you have a critical or abnormal lab result, we will notify you by phone as soon as possible.  Submit refill requests through Freebee or call your pharmacy and they will forward the refill request to us. Please allow 3 business days for your refill to be completed.          Additional  "Information About Your Visit        MyChart Information     [employer] lets you send messages to your doctor, view your test results, renew your prescriptions, schedule appointments and more. To sign up, go to [email or web address] . Click on \"Log in\" on the left side of the screen, which will take you to the Welcome page. Then click on \"Sign up Now\" on the right side of the page.     You will be asked to enter the access code listed below, as well as some personal information. Please follow the directions to create your username and password.     Your access code is: [email or web address]  Expires: 4/15/2018  4:15 PM     Your access code will  in 90 days. If you need help or a new code, please call your [place] clinic or [phone].        Care EveryWhere ID     This is your Care EveryWhere ID. This could be used by other organizations to access your [place] medical records  [id number]        Your Vitals Were     Pulse Temperature Height Last Period Pulse Oximetry BMI (Body Mass Index)    77 98  F (36.7  C) (Oral) 5' 1\" (1.549 m) 2018 (Exact Date) 100% 44.52 kg/m2       Blood Pressure from Last 3 Encounters:   01/15/18 (!) 145/92   17 136/88   16 143/90    Weight from Last 3 Encounters:   01/15/18 235 lb 9.6 oz (106.9 kg)   17 180 lb (81.6 kg)   16 180 lb (81.6 kg)              We Performed the Following     CBC with platelets     Comprehensive metabolic panel     Hemoglobin A1c     MENTAL HEALTH REFERRAL  - Adult; Psychiatry and Medication Management; Psychiatry; [facility]: Psychiatry Clinic [phone]; We will contact you to schedule the appointment or please call with any questions     TSH with free T4 reflex     Vitamin D Deficiency          Today's Medication Changes          These changes are accurate as of: 1/15/18  4:15 PM.  If you have any questions, ask your nurse or doctor.               Stop taking these medicines if you haven't already. Please contact your care team if you " have questions.     buPROPion 150 MG 24 hr tablet   Commonly known as:  WELLBUTRIN XL   Stopped by:  Shivani Clemons MD           lithium 300 MG CR tablet   Commonly known as:  ESKALITH/LITHOBID   Stopped by:  Shivani Clemons MD           metFORMIN 500 MG 24 hr tablet   Commonly known as:  GLUCOPHAGE-XR   Stopped by:  Shivani Clemons MD                    Primary Care Provider Office Phone # Fax #    Shivani Clemons [phone] [address] [facility] [address]        Equal Access to Services     [facility]: Hadii deshawn payne hadasho Soomaali, waaxda luqadaha, qaybta kaalmada ademiguel, nilo ramos . So [facility] [phone].    ATENCIÓN: Si habla español, tiene a murphy disposición servicios gratuitos de asistencia lingüística. Ll[facility]ier [facility] [phone].    We comply with applicable federal civil rights laws and [place] laws. We do not discriminate on the basis of race, color, national origin, age, disability, sex, sexual orientation, or gender identity.            Thank you!     Thank you for choosing [facility]  for your care. Our goal is always to provide you with excellent care. Hearing back from our patients is one way we can continue to improve our services. Please take a few minutes to complete the written survey that you may receive in the mail after your visit with us. Thank you!             Your Updated Medication List - Protect others around you: Learn how to safely use, store and throw away your medicines at www.disposemymeds.org.          This list is accurate as of: 1/15/18  4:15 PM.  Always use your most recent med list.                   Brand Name Dispense Instructions for use Diagnosis    cholecalciferol 1000 UNIT tablet    vitamin D3    90 tablet    Take one tablet daily, starting after you have finished the once a week dosing of the 50,000 Units dose.    MS (multiple sclerosis) (H)       ibuprofen 200 MG tablet     ADVIL/MOTRIN     Take 2-3 tablets by mouth as needed        Interferon Beta-1a 44 MCG/0.5ML Soaj     12 Syringe    Inject 44 mcg Subcutaneous three times a week    Multiple sclerosis (H)       norgestrel-ethinyl estradiol 0.3-30 MG-MCG per tablet    LO/OVRAL    90 tablet    Take 1 tablet by mouth daily    Other general counseling and advice for contraceptive management       order for DME     1 Device    Equipment being ordered: nebulizer with tubing    Wheezing       zolpidem 5 MG tablet    AMBIEN    30 tablet    Take 1 tablet (5 mg) by mouth nightly as needed for sleep    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

## 2018-01-15 NOTE — PROGRESS NOTES
"   SUBJECTIVE:   CC: Alejandra Forbes is an 37 year old woman who presents for preventive health visit.   She presents today with her , she is crying but is consolable easily she reports she does not believe in Western medication as they only treat the symptom without causing complete healing.  She reports she stopped taking all medication  was on lithium- stopped on own - summer 2017- was taking 600-900 mg a dayand would like certain blood test, levels interbody checked.  She declined any medications today.  She has long-standing history of bipolar disorder -she reports Dr Quiros- Sherrie jain- was giving lithium for bipolar  Disorder  She reports she is not able take lithium daily as her body does not feel good on it- so now has not taken any for several months-   reports without medications- a lot of anxiety and a lot of energy inside & when she returns from  Work- lays down- and starts having convulsions-she reports sh has control over the convulsions as they come only when lays down- and she allows her body enegry to come out as convulsion- she is totally aware of it and can talk while its happening- and feels intense energy being relived and when its done- it feels better. There is usually a point that she knows that energy has run up , what needs to do and she can get up and fix dinner and rest of the evening    She stopped all medications as the medications hightening the issues of excess energy  Has been taking  xanax - it clams her down and makes things manageable    She reports few night ago, was unable to sleep and used an Ambien, xanax and 5 alcoholic drinks to help her sleep  She denies drugs or alcohol abuse and  concurs     She described at length a spiritual experience that happened years ago.  She calls it reyna energy and experienced it at meditation treat-was practicing loving kindess meditation and changed the words for \"may I receive love\" and had energy on the " word receive and felt energy through the base of the spine and filled all the way up and shooting up through the crown of the head- & since then that energy has not left her    She reports she has been  Using sugar and food to calm down the energy & hence the weight gain    She Wants blood tests to  know about chemicals inside the body & wants all   Metabolic and thyroid testing done    Has been on interferon- has still taken it once in a while  Physical   Annual:     Getting at least 3 servings of Calcium per day::  NO    Bi-annual eye exam::  Yes    Dental care twice a year::  NO    Sleep apnea or symptoms of sleep apnea::  None    Diet::  Regular (no restrictions)    Taking medications regularly::  Not Applicable    Additional concerns today::  YES                  PROBLEMS TO ADD ON...    Today's PHQ-2 Score:   PHQ-2 ( 1999 [employer]) 1/15/2018   Q1: Little interest or pleasure in doing things 1   Q2: Feeling down, depressed or hopeless 1   PHQ-2 Score 2   Q1: Little interest or pleasure in doing things Several days   Q2: Feeling down, depressed or hopeless Several days   PHQ-2 Score 2       Abuse: Current or Past(Physical, Sexual or Emotional)- No  Do you feel safe in your environment - Yes    Social History   Substance Use Topics     Smoking status: Never Smoker     Smokeless tobacco: Never Used     Alcohol use No      Comment: 0-1 Drinks Per Week     Alcohol Use 1/15/2018   If you drink alcohol, do you typically have greater than 3 drinks per day OR greater than 7 drinks per week?   Not applicable       Reviewed orders with patient.  Reviewed health maintenance and updated orders accordingly - Yes  BP Readings from Last 3 Encounters:   01/15/18 (!) 145/92   08/24/17 136/88   02/23/16 143/90    Wt Readings from Last 3 Encounters:   01/15/18 235 lb 9.6 oz (106.9 kg)   08/24/17 180 lb (81.6 kg)   02/23/16 180 lb (81.6 kg)                      Patient under age 50, mutual decision reflected in health  "maintenance.        Pertinent mammograms are reviewed under the imaging tab.  History of abnormal Pap smear: NO - age 30-65 PAP every 5 years with negative HPV co-testing recommended    Reviewed and updated as needed this visit by clinical staff  Tobacco  Allergies  Meds         Reviewed and updated as needed this visit by Provider  Meds          Review of Systems  C: NEGATIVE for fever, chills, change in weight  I: NEGATIVE for worrisome rashes, moles or lesions  E: NEGATIVE for vision changes or irritation  ENT: NEGATIVE for ear, mouth and throat problems  R: NEGATIVE for significant cough or SOB  B: NEGATIVE for masses, tenderness or discharge  CV: NEGATIVE for chest pain, palpitations or peripheral edema  GI: NEGATIVE for nausea, abdominal pain, heartburn, or change in bowel habits  : NEGATIVE for unusual urinary or vaginal symptoms. Periods are regular.  M: NEGATIVE for significant arthralgias or myalgia  N: NEGATIVE for weakness, dizziness or paresthesias  P: NEGATIVE for changes in mood or affect     OBJECTIVE:   BP (!) 145/92  Pulse 77  Temp 98  F (36.7  C) (Oral)  Ht 5' 1\" (1.549 m)  Wt 235 lb 9.6 oz (106.9 kg)  LMP 01/12/2018 (Exact Date)  SpO2 100%  BMI 44.52 kg/m2. Strong body odour  Physical Exam  GENERAL: healthy, alert and no distress  EYES: Eyes grossly normal to inspection, PERRL and conjunctivae and sclerae normal  HENT: ear canals and TM's normal, nose and mouth without ulcers or lesions  NECK: no adenopathy, no asymmetry, masses, or scars and thyroid normal to palpation  RESP: lungs clear to auscultation - no rales, rhonchi or wheezes  BREAST: normal without masses, tenderness or nipple discharge and no palpable axillary masses or adenopathy  CV: regular rate and rhythm, normal S1 S2, no S3 or S4, no murmur, click or rub, no peripheral edema and peripheral pulses strong  ABDOMEN: soft, nontender, no hepatosplenomegaly, no masses and bowel sounds normal   (female): normal female " external genitalia, normal urethral meatus, vaginal mucosa pink, moist, well rugated, and normal cervix/adnexa/uterus without masses or discharge  MS: no gross musculoskeletal defects noted, no edema  SKIN: no suspicious lesions or rashes  NEURO: Normal strength and tone, mentation intact and speech normal  PSYCH: tearful, anxious, fatigued and appearance disheveled    ASSESSMENT/PLAN:   (Z00.00) Routine general medical examination at a health care facility  (primary encounter diagnosis  Plan: Please see patient instructions . Family planning- she has been on oral contraceptive pills- and stopped on and off- advised for home urine pregnancy test if periods irregular. enocuarged consistent use to prevent unintended pregnancy    (G35) Multiple sclerosis (H)  Comment: interferone on and off  stopped seeing neurologist  Plan: encouarged to follow up with neurologist  She reports no new neurological symptoms     (F31.81) Bipolar 2 disorder (HCC)  Plan: Complex mental health, she has stopped all medications.  She is unwilling and not ready to resume medication.  We had a long discussion about the need to follow-up with psychiatrist and continue with counseling.  She is willing to resume the counseling.  Her  reports she is safe at home.  She probably has conversion disorder/somatoform -the convulsions, that  she described in detail make her mental history more complex and the need for medication management more urgent  She is willing to get her referral for mental-health counseling, meanwhile try to reach her previous counselors again- monie griffith, psycoglogist  She is encouraged follow-up in 2 weeks especially if unable to resume counseling  We will check basic and comprehensive metabolic panel thyroid blood test     TSH with free T4 reflex, Comprehensive         metabolic panel, CBC with platelets, Vitamin D         Deficiency, MENTAL HEALTH REFERRAL  - Adult;         Psychiatry and Medication Management;     "     Psychiatry; [facility]: Psychiatry Clinic [phone]; We will contact you to schedule the         appointment or please call with any questions      (H20.13,  G35) 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)  Plan: encouraged opthalmology follow up     (R73.9,  T50.595A) Hyperglycemia, drug-induced  Plan: Hemoglobin A1c         (R03.0) Elevated blood pressure reading without diagnosis of hypertension  Plan: discussed life style changes that seem hard for her to folow due to on going mental health issues    (Z12.4) Screening for cervical cancer  Comment: LMP-10/13/18- patient declined pap today  Plan: CANCELED: Pap imaged thin layer screen with HPV        - recommended age 30 - 65 years (select HPV         order below)    (Z23) Need for vaccination  Plan: HC FLU VAC PRESRV FREE QUAD SPLIT VIR 3+YRS IM    In addition to the preventive visit, 15  minutes of the appointment were spent evaluating and developing a treatment plan for she additional concern(s) as above.        COUNSELING:  Reviewed preventive health counseling, as reflected in patient instructions       Regular exercise       Healthy diet/nutrition         reports that she has never smoked. She has never used smokeless tobacco.    Estimated body mass index is 44.52 kg/(m^2) as calculated from the following:    Height as of this encounter: 5' 1\" (1.549 m).    Weight as of this encounter: 235 lb 9.6 oz (106.9 kg).         Counseling Resources:  ATP IV Guidelines  Pooled Cohorts Equation Calculator  Breast Cancer Risk Calculator  FRAX Risk Assessment  ICSI Preventive Guidelines  Dietary Guidelines for Americans, 2010  [employer]'s MyPlate  ASA Prophylaxis  Lung CA Screening    Shivani Clemons MD  [facility] CLINICAnswers for HPI/ROS submitted by the patient on 1/15/2018   PHQ-2 Score: 2    "

## 2018-01-16 LAB
ALBUMIN SERPL-MCNC: 3.8 G/DL (ref 3.4–5)
ALP SERPL-CCNC: 93 U/L (ref 40–150)
ALT SERPL W P-5'-P-CCNC: 18 U/L (ref 0–50)
ANION GAP SERPL CALCULATED.3IONS-SCNC: 9 MMOL/L (ref 3–14)
AST SERPL W P-5'-P-CCNC: 14 U/L (ref 0–45)
BILIRUB SERPL-MCNC: 0.2 MG/DL (ref 0.2–1.3)
BUN SERPL-MCNC: 14 MG/DL (ref 7–30)
CALCIUM SERPL-MCNC: 8.8 MG/DL (ref 8.5–10.1)
CHLORIDE SERPL-SCNC: 106 MMOL/L (ref 94–109)
CO2 SERPL-SCNC: 25 MMOL/L (ref 20–32)
CREAT SERPL-MCNC: 0.53 MG/DL (ref 0.52–1.04)
GFR SERPL CREATININE-BSD FRML MDRD: >90 ML/MIN/1.7M2
GLUCOSE SERPL-MCNC: 105 MG/DL (ref 70–99)
POTASSIUM SERPL-SCNC: 3.7 MMOL/L (ref 3.4–5.3)
PROT SERPL-MCNC: 7.5 G/DL (ref 6.8–8.8)
SODIUM SERPL-SCNC: 140 MMOL/L (ref 133–144)
TSH SERPL DL<=0.005 MIU/L-ACNC: 2.58 MU/L (ref 0.4–4)

## 2018-01-17 LAB — DEPRECATED CALCIDIOL+CALCIFEROL SERPL-MC: 22 UG/L (ref 20–75)

## 2018-01-29 ENCOUNTER — OFFICE VISIT (OUTPATIENT)
Dept: FAMILY MEDICINE | Facility: CLINIC | Age: 38
End: 2018-01-29
Payer: COMMERCIAL

## 2018-01-29 VITALS
WEIGHT: 234.8 LBS | HEART RATE: 107 BPM | OXYGEN SATURATION: 97 % | BODY MASS INDEX: 44.37 KG/M2 | SYSTOLIC BLOOD PRESSURE: 132 MMHG | RESPIRATION RATE: 16 BRPM | TEMPERATURE: 96.5 F | DIASTOLIC BLOOD PRESSURE: 80 MMHG

## 2018-01-29 DIAGNOSIS — E66.01 MORBID OBESITY (H): ICD-10-CM

## 2018-01-29 DIAGNOSIS — T50.905A HYPERGLYCEMIA, DRUG-INDUCED: ICD-10-CM

## 2018-01-29 DIAGNOSIS — R73.9 HYPERGLYCEMIA, DRUG-INDUCED: ICD-10-CM

## 2018-01-29 DIAGNOSIS — G35 MULTIPLE SCLEROSIS (H): ICD-10-CM

## 2018-01-29 DIAGNOSIS — F31.81 BIPOLAR 2 DISORDER (H): ICD-10-CM

## 2018-01-29 DIAGNOSIS — Z12.4 SCREENING FOR CERVICAL CANCER: Primary | ICD-10-CM

## 2018-01-29 PROCEDURE — 87624 HPV HI-RISK TYP POOLED RSLT: CPT | Performed by: FAMILY MEDICINE

## 2018-01-29 PROCEDURE — 99214 OFFICE O/P EST MOD 30 MIN: CPT | Performed by: FAMILY MEDICINE

## 2018-01-29 PROCEDURE — G0145 SCR C/V CYTO,THINLAYER,RESCR: HCPCS | Performed by: FAMILY MEDICINE

## 2018-01-29 RX ORDER — METFORMIN HCL 500 MG
1000 TABLET, EXTENDED RELEASE 24 HR ORAL
Qty: 90 TABLET | Refills: 1 | Status: SHIPPED | OUTPATIENT
Start: 2018-01-29 | End: 2018-04-13

## 2018-01-29 NOTE — MR AVS SNAPSHOT
After Visit Summary   1/29/2018 April FRANCIS Forbes    MRN: [id number]           Patient Information     Date Of Birth          1980        Visit Information        Provider Department      1/29/2018 4:00 PM Shivani Clemons MD [facility]        Today's Diagnoses     Screening for cervical cancer    -  1    Hyperglycemia, drug-induced        Bipolar 2 disorder (HCC)        Multiple sclerosis (H)          Care Instructions    metformin 2 tabs once daily  Avid fatty foods, sugar, carb's  See neurologist            Follow-ups after your visit        Additional Services     NEUROLOGY ADULT REFERRAL       Your provider has referred you for the following:   [place] [facility] [facility] [phone]   [email or web address]  [facility] [facility] [phone]   [email or web address]  [place] [phone]   [email or web address]    Please be aware that coverage of these services is subject to the terms and limitations of your health insurance plan.  Call member services at your health plan with any benefit or coverage questions.      Please bring the following with you to your appointment:    (1) Any X-Rays, CTs or MRIs which have been performed.  Contact the facility where they were done to arrange for  prior to your scheduled appointment.    (2) List of current medications  (3) This referral request   (4) Any documents/labs given to you for this referral                  Your next 10 appointments already scheduled     Feb 02, 2018  8:00 AM CST   RETURN GENERAL with Danya Pagan MD   Eye Clinic ([facility])    [address]  [facility] [address] [phone]              Future tests that were ordered for you today     Open Future Orders        Priority Expected  "Expires Ordered    **A1C FUTURE 3mo Routine 2018    Glucose Routine  2019            Who to contact     If you have questions or need follow up information about today's clinic visit or your schedule please contact [facility] directly at [phone].  Normal or non-critical lab and imaging results will be communicated to you by MyChart, letter or phone within 4 business days after the clinic has received the results. If you do not hear from us within 7 days, please contact the clinic through [employer] or phone. If you have a critical or abnormal lab result, we will notify you by phone as soon as possible.  Submit refill requests through [employer] or call your pharmacy and they will forward the refill request to us. Please allow 3 business days for your refill to be completed.          Additional Information About Your Visit        [facility] [employer] Information     [employer] lets you send messages to your doctor, view your test results, renew your prescriptions, schedule appointments and more. To sign up, go to [email or web address] . Click on \"Log in\" on the left side of the screen, which will take you to the Welcome page. Then click on \"Sign up Now\" on the right side of the page.     You will be asked to enter the access code listed below, as well as some personal information. Please follow the directions to create your username and password.     Your access code is: [email or web address]  Expires: 4/15/2018  4:15 PM     Your access code will  in 90 days. If you need help or a new code, please call your [place] clinic or [phone].        Care EveryWhere ID     This is your Care EveryWhere ID. This could be used by other organizations to access your [place] medical records  [id number]        Your Vitals Were     Pulse Temperature Respirations Last Period Pulse Oximetry BMI (Body Mass Index)    107 96.5  F (35.8  C) (Oral) 16 2018 (Exact Date) 97% 44.37 kg/m2       " Blood Pressure from Last 3 Encounters:   01/29/18 132/80   01/15/18 (!) 145/92   08/24/17 136/88    Weight from Last 3 Encounters:   01/29/18 234 lb 12.8 oz (106.5 kg)   01/15/18 235 lb 9.6 oz (106.9 kg)   08/24/17 180 lb (81.6 kg)              We Performed the Following     HPV High Risk Types DNA Cervical     NEUROLOGY ADULT REFERRAL     Pap imaged thin layer screen with HPV - recommended age 30 - 65 years (select HPV order below)          Today's Medication Changes          These changes are accurate as of 1/29/18  4:23 PM.  If you have any questions, ask your nurse or doctor.               Start taking these medicines.        Dose/Directions    metFORMIN 500 MG 24 hr tablet   Commonly known as:  GLUCOPHAGE-XR   Used for:  Hyperglycemia, drug-induced   Started by:  Shivani Clemons MD        Dose:  1000 mg   Take 2 tablets (1,000 mg) by mouth daily (with dinner)   Quantity:  90 tablet   Refills:  1            Where to get your medicines      These medications were sent to [place] [facility] [address] [facility] - [place], [place] - [address], [place] [address]     Phone:  [phone]     metFORMIN 500 MG 24 hr tablet                Primary Care Provider Office Phone # Fax #    Shivani Clemons [phone] [address] [facility] [address]        Equal Access to Services     [facility]: Delilah Valerio, waaxda luqadaha, qaybta kaalmaclaude burns, nilo ramos . So [facility] [phone].    ATENCIÓN: Si habla español, tiene a murphy disposición servicios gratuitos de asistencia lingüística. Llame al [phone].    We comply with applicable federal civil rights laws and [place] laws. We do not discriminate on the basis of race, color, national origin, age, disability, sex, sexual orientation, or gender identity.            Thank you!     Thank you for choosing [facility]  for your care. Our goal is always to provide  you with excellent care. Hearing back from our patients is one way we can continue to improve our services. Please take a few minutes to complete the written survey that you may receive in the mail after your visit with us. Thank you!             Your Updated Medication List - Protect others around you: Learn how to safely use, store and throw away your medicines at www.disposemymeds.org.          This list is accurate as of 1/29/18  4:23 PM.  Always use your most recent med list.                   Brand Name Dispense Instructions for use Diagnosis    cholecalciferol 1000 UNIT tablet    vitamin D3    90 tablet    Take one tablet daily, starting after you have finished the once a week dosing of the 50,000 Units dose.    MS (multiple sclerosis) (H)       ibuprofen 200 MG tablet    ADVIL/MOTRIN     Take 2-3 tablets by mouth as needed        Interferon Beta-1a 44 MCG/0.5ML Soaj     12 Syringe    Inject 44 mcg Subcutaneous three times a week    Multiple sclerosis (H)       metFORMIN 500 MG 24 hr tablet    GLUCOPHAGE-XR    90 tablet    Take 2 tablets (1,000 mg) by mouth daily (with dinner)    Hyperglycemia, drug-induced       norgestrel-ethinyl estradiol 0.3-30 MG-MCG per tablet    LO/OVRAL    90 tablet    Take 1 tablet by mouth daily    Other general counseling and advice for contraceptive management       order for DME     1 Device    Equipment being ordered: nebulizer with tubing    Wheezing       zolpidem 5 MG tablet    AMBIEN    30 tablet    Take 1 tablet (5 mg) by mouth nightly as needed for sleep    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

## 2018-01-29 NOTE — LETTER
February 7, 2018 April FRANCIS Forbes  [address]    [place] [address]    Dear April,  We are happy to inform you that your PAP smear result from 01/29/18 is normal.  We are now able to do a follow up test on PAP smears. The DNA test is for HPV (Human Papilloma Virus). Cervical cancer is closely linked with certain types of HPV. Your result showed no evidence of high risk HPV.  Therefore we recommend you return in 3 years for your next pap smear.  You will still need to return to the clinic every year for an annual exam and other preventive tests.  Please contact the clinic at [phone] with any questions.  Sincerely,    Shivani Clemons MD/bebe

## 2018-01-29 NOTE — PROGRESS NOTES
Chief complaints/HPI      Alejandra Forbes is a 37 year old female presents with following concerns.  CC:1. Pap smear- had periods on the day of annual physical   2. Discuss labs- had been on perdinsone for a very long time for inflammation in eyes. Had been on metformin for hyperglycemia- stopped all medications -on own- willing to resume again  3.Follow up about mental health.She reports she is doing much better  Has appointment with a new psychiatrist- Dr benson- has spoken with him directly on phone about her bipolar disorder- still not ready to start the medications   She reports the energy going through her body is changed and is much manageable and not having same volunteer type seizure as before  She has known history of MS- & has yet to make appointment for neurology- she stopped interferon on own        Current Outpatient Prescriptions   Medication     metFORMIN (GLUCOPHAGE-XR) 500 MG 24 hr tablet     Interferon Beta-1a 44 MCG/0.5ML SOAJ     zolpidem (AMBIEN) 5 MG tablet     ORDER FOR DME     ibuprofen (ADVIL,MOTRIN) 200 MG tablet     norgestrel-ethinyl estradiol (LO/OVRAL) 0.3-30 MG-MCG per tablet     cholecalciferol (VITAMIN D) 1000 UNIT tablet     No current facility-administered medications for this visit.        Past medical and family history,medications, allergies and problem list reviewed       ROS: 10 point ROS neg other than the symptoms noted above in the HPI.    EXAM:/80  Pulse 107  Temp 96.5  F (35.8  C) (Oral)  Resp 16  Wt 234 lb 12.8 oz (106.5 kg)  LMP 01/12/2018 (Exact Date)  SpO2 97%  BMI 44.37 kg/m2  Constitutional: healthy, alert and no distress   Cardiovascular: negative,  RRR. No murmurs, clicks gallops or rub  Respiratory:  Lungs clear.no wheezing, crackles.  Abdomen: Abdomen soft, non-tender.no organomegaly, No CVA tenderness.  Vagina and vulva are normal;  no discharge is noted.  Cervix normal without lesions. Uterus anteverted and mobile, normal in size and shape  without tenderness.  Adnexa normal in size without masses or tenderness. Pap Smear - is sent  NEURO: Gait normal. Reflexes normal and symmetric. Sensation grossly WNL.  Psychiatric: mentation appears normal and affect normal/bright    ASSESSMENT / PLAN:  (Z12.4) Screening for cervical cancer  (primary encounter diagnosis)  Plan: Pap imaged thin layer screen with HPV -         recommended age 30 - 65 years (select HPV order        below), HPV High Risk Types DNA Cervical          (R73.9,  T50.905A) Hyperglycemia, drug-induced  Plan: advised to resume metformin ER 1000 mg once daily  Recheck a1c and fasting glucose in 3 months  encouarged to watch diet- its been hard for her- as does admit to using food for comfort  Lab Results   Component Value Date    A1C 7.1 01/15/2018    A1C 7.3 06/08/2015    A1C 5.6 03/31/2011    A1C 5.7 07/03/2008    A1C 5.7 04/17/2006         History of  Bipolar 2 disorder (HCC)  Has appointment with  Dr Regan- this week  Feels better  No medication    Bipolar disorder  Plan: she stopped all medications on own  Has appointment with new psychiatrist this weekend  Reports feeling much better on own than before       Multiple sclerosis (H)  Plan: neurology referal given again    Morbid Obesity  Plan: counseled about limiting calorie & portion control  Its understandable that its been hard for her to follow due to mental health   Fasting lipid are elevated      The patient indicates understanding of these issues and agrees with the plan.      Shivani Clemons

## 2018-01-29 NOTE — NURSING NOTE
"Chief Complaint   Patient presents with     Gyn Exam     Initial /80  Pulse 107  Temp 96.5  F (35.8  C) (Oral)  Resp 16  Wt 234 lb 12.8 oz (106.5 kg)  LMP 01/12/2018 (Exact Date)  SpO2 97%  BMI 44.37 kg/m2 Estimated body mass index is 44.37 kg/(m^2) as calculated from the following:    Height as of 1/15/18: 5' 1\" (1.549 m).    Weight as of this encounter: 234 lb 12.8 oz (106.5 kg).  BP completed using cuff size: large. L arm       Health Maintenance that is potentially due pending provider review:   Pap smear is being completed today.      Shelley Brown CMA     "

## 2018-01-29 NOTE — PROGRESS NOTES
"  SUBJECTIVE:   Alejandra Forbes is a 37 year old female who presents to clinic today for the following health issues:      Pt is here today to complete pap smear. Physical exam completed on 1/15/2018    {additional problems for provider to add:670017}    Problem list and histories reviewed & adjusted, as indicated.  Additional history: {NONE - AS DOCUMENTED:953045::\"as documented\"}    {HIST REVIEW/ LINKS 2:712358}    Reviewed and updated as needed this visit by clinical staff  Tobacco  Allergies  Meds       Reviewed and updated as needed this visit by Provider         {PROVIDER CHARTING PREFERENCE:821552}  "

## 2018-01-30 NOTE — PROGRESS NOTES
Discussed with appointment today  Normal vitamin D level  Normal thyroid level  Normal complete blood count   Normal complete metabolic panel , that checks for  Liver & kidney functions  A1C is high- resume metformin- recheck in 3 months

## 2018-02-01 LAB
COPATH REPORT: NORMAL
PAP: NORMAL

## 2018-02-05 LAB
FINAL DIAGNOSIS: NORMAL
HPV HR 12 DNA CVX QL NAA+PROBE: NEGATIVE
HPV16 DNA SPEC QL NAA+PROBE: NEGATIVE
HPV18 DNA SPEC QL NAA+PROBE: NEGATIVE
SPECIMEN DESCRIPTION: NORMAL
SPECIMEN SOURCE CVX/VAG CYTO: NORMAL

## 2018-02-21 DIAGNOSIS — H35.353 CYSTOID MACULAR EDEMA OF BOTH EYES: Primary | ICD-10-CM

## 2018-02-23 ENCOUNTER — TELEPHONE (OUTPATIENT)
Dept: FAMILY MEDICINE | Facility: CLINIC | Age: 38
End: 2018-02-23

## 2018-02-23 NOTE — TELEPHONE ENCOUNTER
Attempt to reach patient.  Mail box is full.  Asking for Glucose monitor/test strips per message below.  How is she currently checking her BS?  Will try to call later.  Candace Wong RN

## 2018-02-23 NOTE — TELEPHONE ENCOUNTER
Reason for Call: Request for an order or referral:    Order or referral being requested: Because her A1C was high at her last Ov she has been talking her own BS  She is also on Metformin that she feels should be increased because her BS is still high in the AM   She would like a Glucose Metre and test strips      [id number] - [place], [place] - [address] [place] [address]      Date needed: as soon as possible    Has the patient been seen by the PCP for this problem? YES    Additional comments: Please call her if any questions    Phone number Patient can be reached at:  Home number on file [phone] (home)    Best Time:  anytime    Can we leave a detailed message on this number?  YES    Call taken on 2/23/2018 at 3:35 PM by Jessica Wynn

## 2018-04-13 DIAGNOSIS — T50.905A HYPERGLYCEMIA, DRUG-INDUCED: ICD-10-CM

## 2018-04-13 DIAGNOSIS — R73.9 HYPERGLYCEMIA, DRUG-INDUCED: ICD-10-CM

## 2018-04-16 RX ORDER — METFORMIN HCL 500 MG
TABLET, EXTENDED RELEASE 24 HR ORAL
Qty: 60 TABLET | Refills: 0 | Status: SHIPPED | OUTPATIENT
Start: 2018-04-16 | End: 2018-05-09

## 2018-04-16 NOTE — TELEPHONE ENCOUNTER
"Medication is being filled for 1 time refill only due to:  Patient needs labs A1c and glucose - see OV notes from AS 1/29/2018 - repeat labs in 3 months.   Cherelle ADAN RN    Requested Prescriptions   Pending Prescriptions Disp Refills     metFORMIN (GLUCOPHAGE-XR) 500 MG 24 hr tablet [Pharmacy Med Name: METFORMIN  MG TABLET] 90 tablet 1     Sig: TAKE 2 TABLETS (1,000 MG) BY MOUTH DAILY (WITH DINNER)    Biguanide Agents Passed    4/13/2018  5:20 PM       Passed - Blood pressure less than 140/90 in past 6 months    BP Readings from Last 3 Encounters:   01/29/18 132/80   01/15/18 (!) 145/92   08/24/17 136/88                Passed - Patient is age 10 or older       Passed - Recent (12 mo) or future (30 days) visit within the authorizing provider's specialty     Patient had office visit in the last 12 months or has a visit in the next 30 days with authorizing provider or within the authorizing provider's specialty.  See \"Patient Info\" tab in inbasket, or \"Choose Columns\" in Meds & Orders section of the refill encounter.           Passed - Patient does NOT have a diagnosis of CHF.       Passed - Patient is not pregnant       Passed - Patient has not had a positive pregnancy test within the past 12 mos.             "

## 2018-05-04 ENCOUNTER — TELEPHONE (OUTPATIENT)
Dept: FAMILY MEDICINE | Facility: CLINIC | Age: 38
End: 2018-05-04

## 2018-05-04 DIAGNOSIS — T50.905A HYPERGLYCEMIA, DRUG-INDUCED: ICD-10-CM

## 2018-05-04 DIAGNOSIS — R73.9 HYPERGLYCEMIA, DRUG-INDUCED: ICD-10-CM

## 2018-05-04 LAB — HBA1C MFR BLD: 6.5 % (ref 0–5.6)

## 2018-05-04 PROCEDURE — 36415 COLL VENOUS BLD VENIPUNCTURE: CPT | Performed by: FAMILY MEDICINE

## 2018-05-04 PROCEDURE — 83036 HEMOGLOBIN GLYCOSYLATED A1C: CPT | Performed by: FAMILY MEDICINE

## 2018-05-04 NOTE — PROGRESS NOTES
Dear Triage, I just left a voice message myself  She does need fasting glucose as well & I just called and left a voice message

## 2018-05-04 NOTE — TELEPHONE ENCOUNTER
----- Message from Shivani Clemons MD sent at 5/4/2018  9:11 AM CDT -----  Regarding: please add fasting glucose/ pt had lab only eunice today & i see A1C resulted only.  Dear lab  Please add fasting glucose- that was futured as well  She got A1C completed today but no fasting glucose    Dear Triage, if you can call lab- in case lab not able to review this message before the end of the day    Thanks all

## 2018-05-04 NOTE — TELEPHONE ENCOUNTER
AS,  FYI:  Patient informed of below  Will get fasting glucose Wed AM next week - seeing you Wed as well  States she cannot come back today  Cherelle ADAN RN

## 2018-05-04 NOTE — TELEPHONE ENCOUNTER
AS,  Lab cannot add glucose onto A1c that was drawn  She said wrong type of tube was collected for this  Juventino in lab states she asked pt if she was just here for A1c and patient said yes  Cherelle ADAN RN

## 2018-05-09 ENCOUNTER — OFFICE VISIT (OUTPATIENT)
Dept: FAMILY MEDICINE | Facility: CLINIC | Age: 38
End: 2018-05-09
Payer: COMMERCIAL

## 2018-05-09 VITALS
HEIGHT: 61 IN | BODY MASS INDEX: 44.42 KG/M2 | RESPIRATION RATE: 16 BRPM | DIASTOLIC BLOOD PRESSURE: 88 MMHG | HEART RATE: 95 BPM | SYSTOLIC BLOOD PRESSURE: 126 MMHG | WEIGHT: 235.3 LBS | TEMPERATURE: 98.1 F | OXYGEN SATURATION: 98 %

## 2018-05-09 DIAGNOSIS — Z30.41 FAMILY PLANNING, BCP (BIRTH CONTROL PILLS) MAINTENANCE: ICD-10-CM

## 2018-05-09 DIAGNOSIS — F31.81 BIPOLAR 2 DISORDER (H): ICD-10-CM

## 2018-05-09 DIAGNOSIS — G35 MULTIPLE SCLEROSIS (H): ICD-10-CM

## 2018-05-09 DIAGNOSIS — E11.65 TYPE 2 DIABETES MELLITUS WITH HYPERGLYCEMIA, WITHOUT LONG-TERM CURRENT USE OF INSULIN (H): Primary | ICD-10-CM

## 2018-05-09 DIAGNOSIS — F43.9 SITUATIONAL STRESS: ICD-10-CM

## 2018-05-09 DIAGNOSIS — E66.01 MORBID OBESITY (H): ICD-10-CM

## 2018-05-09 LAB
CHOLEST SERPL-MCNC: 151 MG/DL
CREAT UR-MCNC: <3 MG/DL
GLUCOSE SERPL-MCNC: 130 MG/DL (ref 70–99)
HDLC SERPL-MCNC: 39 MG/DL
LDLC SERPL CALC-MCNC: 96 MG/DL
MICROALBUMIN UR-MCNC: 16 MG/L
MICROALBUMIN/CREAT UR: NORMAL MG/G CR (ref 0–25)
NONHDLC SERPL-MCNC: 112 MG/DL
TRIGL SERPL-MCNC: 80 MG/DL

## 2018-05-09 PROCEDURE — 99214 OFFICE O/P EST MOD 30 MIN: CPT | Performed by: FAMILY MEDICINE

## 2018-05-09 PROCEDURE — 82043 UR ALBUMIN QUANTITATIVE: CPT | Performed by: FAMILY MEDICINE

## 2018-05-09 PROCEDURE — 82947 ASSAY GLUCOSE BLOOD QUANT: CPT | Performed by: FAMILY MEDICINE

## 2018-05-09 PROCEDURE — 99207 C FOOT EXAM  NO CHARGE: CPT | Performed by: FAMILY MEDICINE

## 2018-05-09 PROCEDURE — 80061 LIPID PANEL: CPT | Performed by: FAMILY MEDICINE

## 2018-05-09 RX ORDER — METFORMIN HCL 500 MG
1000 TABLET, EXTENDED RELEASE 24 HR ORAL 2 TIMES DAILY WITH MEALS
Qty: 120 TABLET | Refills: 3 | Status: SHIPPED | OUTPATIENT
Start: 2018-05-09 | End: 2018-09-04

## 2018-05-09 NOTE — PROGRESS NOTES
SUBJECTIVE:   Alejandra Forbes is a 38 year old female who presents to clinic today for the following health issues:          Has been on Glucophage 100 mg  Changed diet and trying to get some excercise  SMBG three times daily atleast  Did not bring records & reports in am averages 130-160  Has fasted for glucose testing  Hoping she is not diabetic  But has strong family history of Diabetes   Has made appointment to discuss labs       Did see psychiatrist- might seem him agaim  Did not feel no medications needed  Being laid off- not too woried about     PROBLEMS TO ADD ON...    Problem list and histories reviewed & adjusted, as indicated.  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     Hyperglycemia, drug-induced     Bipolar 2 disorder (HCC)     Anxiety attack     Morbid obesity (H)     Type 2 diabetes mellitus with hyperglycemia, without long-term current use of insulin (H)     Past Surgical History:   Procedure Laterality Date     C NONSPECIFIC PROCEDURE  4/06, 7/06    glaucoma surgery of both eyes due to steroid txt of pars planaris     CATARACT IOL, RT/LT Left 2006     GLAUCOMA SURGERY Bilateral 2006     YAG CAPSULOTOMY OS (LEFT EYE) Left 09/13       Social History   Substance Use Topics     Smoking status: Never Smoker     Smokeless tobacco: Never Used     Alcohol use No      Comment: 0-1 Drinks Per Week     Family History   Problem Relation Age of Onset     DIABETES Father      Severe Type 2      "Alcohol/Drug Father      Allergies Father      Depression Father      Bipolar     Obesity Father      Blood Disease Father      passed away from septic shock in 6/07     DIABETES Paternal Grandfather      Type 2     Depression Paternal Grandfather      Allergies Brother      Eye Disorder Paternal Grandmother      Glaucoma, Cataracts     OSTEOPOROSIS Paternal Grandmother      Glaucoma Paternal Grandmother      Lipids Maternal Grandmother      CANCER Paternal Aunt      lung, smoker     Breast Cancer Mother 60     Macular Degeneration No family hx of            Reviewed and updated as needed this visit by clinical staff  Tobacco  Allergies  Meds       Reviewed and updated as needed this visit by Provider         ROS:  Constitutional, HEENT, cardiovascular, pulmonary, GI, , musculoskeletal, neuro, skin, endocrine and psych systems are negative, except as otherwise noted.    OBJECTIVE:     /88  Pulse 95  Temp 98.1  F (36.7  C) (Oral)  Resp 16  Ht 5' 1\" (1.549 m)  Wt 235 lb 4.8 oz (106.7 kg)  LMP 05/06/2018  SpO2 98%  BMI 44.46 kg/m2  Body mass index is 44.46 kg/(m^2).  GENERAL: healthy, alert and no distress  NECK: no adenopathy, no asymmetry, masses, or scars and thyroid normal to palpation  RESP: lungs clear to auscultation - no rales, rhonchi or wheezes  CV: regular rate and rhythm, normal S1 S2, no S3 or S4, no murmur, click or rub, no peripheral edema and peripheral pulses strong  ABDOMEN: soft, nontender, no hepatosplenomegaly, no masses and bowel sounds normal  NEURO: Normal strength and tone, mentation intact and speech normal  PSYCH: mentation appears normal, affect normal/bright  Diabetic foot exam: normal DP and PT pulses, no trophic changes or ulcerative lesions and normal sensory exam    Diagnostic Test Results:  Results for orders placed or performed in visit on 05/09/18 (from the past 24 hour(s))   Glucose   Result Value Ref Range    Glucose 130 (H) 70 - 99 mg/dL   Lipid panel reflex to " direct LDL   Result Value Ref Range    Cholesterol 151 <200 mg/dL    Triglycerides 80 <150 mg/dL    HDL Cholesterol 39 (L) >49 mg/dL    LDL Cholesterol Calculated 96 <100 mg/dL    Non HDL Cholesterol 112 <130 mg/dL   Albumin Random Urine Quantitative with Creat Ratio   Result Value Ref Range    Creatinine Urine <3 mg/dL    Albumin Urine mg/L 16 mg/L    Albumin Urine mg/g Cr Unable to calculate due to low value 0 - 25 mg/g Cr       ASSESSMENT/PLAN:   1. Type 2 diabetes mellitus with hyperglycemia, without long-term current use of insulin (H)controlled  Lab Results   Component Value Date    A1C 6.5 05/04/2018    A1C 7.1 01/15/2018    A1C 7.3 06/08/2015    A1C 5.6 03/31/2011    A1C 5.7 07/03/2008     New diagnoses  Previous high glucose were though to be due to high dose of oral steroid use because of uveitis  Has not had oral steroid for at least 9 months    We discussed A1C  Is at Diabetes  Range but its actually improved since watching diet and metformin  A1c at target, commendable effort in watching diet - so keep that up   Increase metformin to full dose  Advised Diabetes  Educator consultation & MTM refferal for Diabetes    annual eye exam      - metFORMIN (GLUCOPHAGE-XR) 500 MG 24 hr tablet; Take 2 tablets (1,000 mg) by mouth 2 times daily (with meals)  Dispense: 120 tablet; Refill: 3  - Albumin Random Urine Quantitative with Creat Ratio  - blood glucose monitoring (NO BRAND SPECIFIED) test strip; Use to test blood sugars twice daily  times daily or as directed  Dispense: 100 strip; Refill: 3  - C FOOT EXAM  NO CHARGE  - Lipid panel reflex to direct LDL Fasting  - DIABETES EDUCATOR REFERRAL  - MED THERAPY MANAGE REFERRAL  - return office visit in 3-4 months  2. Multiple sclerosis (H)  Neurologist- next month  She stopped interferon on own     3. Bipolar 2 disorder (HCC)  - no medications  -self care helps    4. Morbid obesity (H)  - calre reduction    5. Situational stress  -being laid off  Dealing well on own      6. Family planning, BCP (birth control pills) maintenance    - norgestrel-ethinyl estradiol (LO/OVRAL) 0.3-30 MG-MCG per tablet; Take 1 tablet by mouth daily  Dispense: 90 tablet; Refill: 4    Total time with patient today was 25 minutes, more than 15 minutes spent in counseling regarding interpretation of clinical signs and symptoms and going through differentials,additional diagnostic testing options,treatment plan and follow up recommendation.       Shivani Clemons MD  [facility]

## 2018-05-09 NOTE — MR AVS SNAPSHOT
After Visit Summary   5/9/2018 April FRANCIS Forbes    MRN: [id number]           Patient Information     Date Of Birth          1980        Visit Information        Provider Department      5/9/2018 9:00 AM Shivani Clemons MD [facility]        Today's Diagnoses     Type 2 diabetes mellitus with hyperglycemia, without long-term current use of insulin (H)    -  1    Multiple sclerosis (H)        Bipolar 2 disorder (HCC)        Morbid obesity (H)        Situational stress        Family planning, BCP (birth control pills) maintenance          Care Instructions    Diabetic eye exam annually  Am/ premeals glucose up to 120  2 hour post meals ok up to 160  Increase metformin to 2000 mg once daily, ok to divide it in 1000 mg twice daily     Return office visit in 3 months      Goals for Your Diabetes Care  A1c   Goal:  Less than 7 percent--ask your doctor if this is right for you  Check it: Every 3 to 6 months  Why:  Shows how well your blood glucose was controlled over the past 3 months  Blood pressure   Goal: Under 140/90  Check it:  At every clinic check up for diabetes  Why:  May prevent stroke, heart disease and eye and kidney diseases  Cholesterol   Goal:  Discuss cholesterol management with your doctor, and consider taking a statin medicine  Check it:  Every year  Why:  Helps prevent heart attacks and stroke  Kidney test (urine microalbumin)  Goal:  Under 30  Do it:  Every year  Why:  Finds kidney problems before they get worse  Foot exam   Goal:  No cuts or sores, normal sensation  Do it: Remove shoes and socks at every visit; have a monofilament test every year  Why: Finds foot problems before they get worse  Eye exam   Goal:  No changes in your eyes  Do it: Every year  Why:  Finds eye problems before they get worse  Exercise  Goal:  150 minutes of aerobic exercises and 2 to 3 sessions of strength training  Do it: Every week  Why:  Helps manage diabetes and improve  "health  Aspirin  Goal:  If you are age 50 or older, ask your doctor if you should take aspirin  Take it: Every day, or as prescribed  Why: Lowers the risk of heart attack and stroke  Smoking and tobacco  Goal: No tobacco use  Why: Tobacco is a major risk for heart disease  Diabetes education  Goal: Learn how to manage your diabetes  Do it: At least once a year--ask your doctor for a referral  Why: The more you know, the better you can manage your diabetes  Your goals may be different from what you see here. Your care team will tell you what your goals should be.   For informational purposes only. Not to replace the advice of your health care provider.   Copyright   2009 [facility]. All rights reserved. [employer] 655537 - Rev 01/16.    Diabetes with High Blood Sugar  You have been treated for high blood sugar (hyperglycemia). This may be because of an infection or other illness, eating too many sweets or starches, or not taking enough insulin.  Home care  Monitor and write down your blood sugar level at least twice a day. Do this before breakfast and before dinner. If you take insulin, record your routine insulin dose as well. Also record any additional doses required based on your sliding scale. Do this for the next 3 to 5 days.  High blood sugar may cause symptoms that you can learn to recognize, such as:    Frequent urination    Thirst    Dizziness    Headache    Shortness of breath    Breath that smells fruity    Nausea or vomiting    Abdominal pain    Drowsiness or loss of consciousness  If you have high-blood-sugar symptoms, use a blood or urine test to find out what your blood sugar level is. If it is above your usual range, use the \"sliding scale\" regular insulin dose prescribed by your healthcare provider. If you were not given a range for your insulin dose, contact your healthcare provider for more advice.  Follow-up care  Follow up with your healthcare provider, or as advised. You may need " to meet with your healthcare provider in the next week. You will likely review your blood sugar records together. You may also talk to your provider about adjusting your dose of insulin or other medicine for blood sugar.  When to seek medical advice  Call your healthcare provider right away if these occur:    High blood sugar symptoms (Symptoms are described above.)    Blood sugar over 300 mg/dl If you can t reach your healthcare provider, go to a hospital emergency room or urgent care center  Date Last Reviewed: 6/1/2016 2000-2017 [employer]. [address] [place] [address]. All rights reserved. This information is not intended as a substitute for professional medical care. Always follow your healthcare professional's instructions.        Diet: Diabetes  Food is an important tool that you can use to control diabetes and stay healthy. Eating well-balanced meals in the correct amounts will help you control your blood glucose levels and prevent low blood sugar reactions. It will also help you reduce the health risks of diabetes. There is no one specific diet that is right for everyone with diabetes. But there are general guidelines to follow. A registered dietitian (RD) will create a tailored diet approach that s just right for you. He or she will also help you plan healthy meals and snacks. If you have any questions, call your dietitian for advice.     Guidelines for success  Talk with your healthcare provider before starting a diabetes diet or weight loss program. If you haven't talked with a dietitian yet, ask your provider for a referral. The following guidelines can help you succeed:    Select foods from the 6 food groups below. Your dietitian will help you find food choices within each group. He or she will also show you serving sizes and how many servings you can have at each meal.  ? Grains, beans, and starchy vegetables  ? Vegetables  ? Fruit  ? Milk or yogurt  ? Meat, poultry,  fish, or tofu  ? Healthy fats    Check your blood sugar levels as directed by your provider. Take any medicine as prescribed by your provider.    Learn to read food labels and pick the right portion sizes.    Eat only the amount of food in your meal plan. Eat about the same amount of food at regular times each day. Don t skip meals. Eat meals 4 to 5 hours apart, with snacks in between.    Limit alcohol. It raises blood sugar levels. Drink water or calorie-free diet drinks that use safe sweeteners.    Eat less fat to help lower your risk of heart disease. Use nonfat or low-fat dairy products and lean meats. Avoid fried foods. Use cooking oils that are unsaturated, such as olive, canola, or peanut oil.    Talk with your dietitian about safe sugar substitutes.    Avoid added salt. It can contribute to high blood pressure, which can cause heart disease. People with diabetes already have a risk of high blood pressure and heart disease.    Stay at a healthy weight. If you need to lose weight, cut down on your portion sizes. But don t skip meals. Exercise is an important part of any weight management program. Talk with your provider about an exercise program that s right for you.    For more information about the best diet plan for you, talk with a registered dietitian (RD). To find an RD in your area, contact:  ? Academy of Nutrition and Dietetics www.eatright.org  ? The American Diabetes Association [phone] www.diabetes.org  Date Last Reviewed: 8/1/2016 2000-2017 [employer]. [address] [place] [address], [place] [address]. All rights reserved. This information is not intended as a substitute for professional medical care. Always follow your healthcare professional's instructions.        Eating Out When You Have Diabetes  Eating right is an important part of keeping your blood sugar in your target range. You just need to make healthy choices.    Tips for restaurant meals  When you eat away from home try  these tips:    Try to schedule your dining-out meal at your normal meal time. Make a reservation if possible, so you don't have to wait to eat. If you can't make a reservation, try to arrive at the restaurant at a less-busy time to cut down your wait time. Eat a small fruit or starch snack at your regular mealtime if your restaurant meal is going to be later than usual.     Call ahead to see if the restaurant can meet your dietary needs if you've never been there before. Or you can go online to see the menu ahead of time.    Carry some crackers with you in case the restaurant needs you to wait until you can be served.    Ask how foods are prepared before you order.    Instead of fried, sautéed, or breaded foods, choose ones that are broiled, steamed, grilled, or baked.    Ask for sauces, gravies, and dressings on the side.    Only eat an amount that fits your meal plan. Remember: You can take home the leftovers.    Save dessert for special occasions. Then choose a small dessert or share one with a friend or family member.  Make healthy choices  Fast food    Garden salad with light dressing on the side    Baked potato with vegetables or herbs    Broiled, roasted, or grilled chicken sandwich    Sliced turkey or lean roast beef sandwich  Mexican    Chicken enchilada, without cheese or sour cream     Small burrito with whole beans and chicken    Whole beans (not refried) and rice    Chicken or fish fajitas  Steakhouse    Grilled or broiled lean cuts of beef    Baked potato with vegetables or herbs    Broiled or baked chicken. Don t eat the skin.    Steamed vegetables  Asian    Steamed dumplings or potstickers    Broiled, boiled, or steamed meats or fish    Sushi or sashimi    Steamed rice or boiled noodles. One serving is equal to 1/3 cup.  Date Last Reviewed: 6/1/2016 2000-2017 [employer]. [address] [place] [address], [place] [address]. All rights reserved. This information is not intended as a substitute  for professional medical care. Always follow your healthcare professional's instructions.        Before You Start Your Diabetes Exercise Plan  Fitness plays a special role for people who have diabetes. Being fit means becoming healthier by adding activity to your day. Talk to your healthcare provider before getting started. He or she may want you to have a checkup before you become more active. Also, having certain tests first helps you and your healthcare provider learn how you will respond to a fitness program.    Your checkup  A medical checkup helps ensure that your fitness plan will bring you the most benefit. It also keeps at a minimum the risks that may come with physical activity. As part of your checkup:    You may have a test called a hemoglobin A1C. The A1C test measures your average glucose (sugar) level over 2 to 3 months. A1C is usually given as a percentage. But it is now also given as a number representing estimated Average Glucose (eAG). Unlike the A1C percentage, eAG gives you a number similar to the numbers listed on your daily glucose monitor.    Your healthcare provider may check the health of your heart with a resting electrocardiogram. Diabetes can cause heart problems. But a fitness program can help these problems get better. Being fit can also help improve your cholesterol and blood pressure levels.    Your healthcare provider may check the health of your feet. People who have diabetes can have problems with their feet. Your healthcare provider can check your feet before you become more active. Take time to find shoes that will support your feet well while you exercise.   If you have an exercise stress test  An exercise stress test can show how your heart responds to activity and what level of exercise is right for you. You will have small electrodes placed on your chest. You will then walk on a treadmill or ride an exercise bike while your heart rate is monitored. If you have this test,  your healthcare provider will give you more details.     Date Last Reviewed: 7/1/2016 2000-2017 [employer]. [address] [facility], [place] [address]. All rights reserved. This information is not intended as a substitute for professional medical care. Always follow your healthcare professional's instructions.        Diabetes: Tracking Your Fitness Progress    Tracking your fitness progress can help you improve your long-term health. Seeing how far you ve come may motivate you to achieve more. Your doctor can also use a record of any progress to help plan your treatment.  Recording blood sugar levels  Your healthcare provider may have shown you how to check your blood sugar. Now that you re more active, you may need to check it more often. Keep a blood sugar log. That way, you can see how your efforts are paying off. You may also include a column for blood sugar readings in a fitness log (see Keeping a Fitness Log below). Bring any log books with you on healthcare provider visits. Your healthcare provider can use these records to help decide whether to adjust your medications.  Setting a fitness goal  A fitness goal gives you something to reach for. Set a goal you can achieve. It does no good if your goal is beyond your ability. And choose a goal that focuses on action. For instance, your first goal may be to take two 10-minute walks a day for one week. After you reach your first goal, try making the next one more challenging. Invite a friend to exercise with you so you can encourage each other to strive toward your goals.   Keeping a fitness log  Include the information that matters most to you in your fitness log. This may be how you felt before, during, or after exercising. And don t forget to record your blood sugar reading. As time goes on, compare your first entry with more recent entries. You may see a rise in your fitness level and a drop in your blood sugar.  Your fitness reward  Your  chances of reaching a goal increase if you plan a reward. Write down a nonfood reward that matters to you. For instance, you might reward yourself with a night at the movies, a new warm-up suit, or some relaxing music.  Date Last Reviewed: 7/1/2016 2000-2017 [employer]. [address] [place] [address]. All rights reserved. This information is not intended as a substitute for professional medical care. Always follow your healthcare professional's instructions.                Follow-ups after your visit        Additional Services     DIABETES EDUCATOR REFERRAL       DIABETES SELF MANAGEMENT TRAINING (DSMT)      Your provider has referred you to Diabetes Education: FMG: Diabetes Education - All [facility] [phone]   [email or web address]     If an urgent visit is needed or A1C is above 12, Care Team to call the Diabetes  Education Team at [phone] or send an In Basket message to the Diabetes Education Pool (P DIAB ED-PATIENT CARE).    A  will call you to make your appointment. If it has been more than 3 business days since your referral was placed, please call the above phone number to schedule.    Type of training and number of hours: New Diagnosis: Initial group DSMT - 10 hours.      Diabetes Type: Type 2 - On Oral Medication   Medicare covers: 10 hours of initial DSMT in 12 month period from the time of first visit, plus 2 hours of follow-up DSMT annually, and additional hours as requested for insulin training.         Diabetes Co-Morbidities: mental/affective disorder and obesity               A1C Goal:  <7.0       A1C is: Lab Results       Component                Value               Date                       A1C                      6.5                 05/04/2018              MEDICAL NUTRITION THERAPY (MNT) for Diabetes    Medical Nutrition Therapy with a Registered Dietitian can be provided in coordination with  Diabetes Self-Management Training to assist in achieving optimal diabetes management.    MNT Type and Hours: New diagnosis: Initial MNT - 3 hours                       Medicare will cover: 3 hours initial MNT in 12 month period after first visit, plus 2 hours of follow-up MNT annually        Diabetes Education Topics: Comprehensive Knowledge Assessment and Instruction    Special Educational Needs Requiring Individual DSMT: None      Please be aware that coverage of these services is subject to the terms and limitations of your health insurance plan.  Call member services at your health plan to determine Diabetes Self-Management Training (Codes  and ) and Medical Nutrition Therapy (Codes 68340 and 17539) benefits and ask which blood glucose monitor brands are covered by your plan.  Please bring the following with you to your appointment:    (1)  List of current medications   (2)  List of Blood Glucose Monitor brands that are covered by your insurance plan  (3)  Blood Glucose Monitor and log book  (4)   Food records for the 3 days prior to your visit    The Certified Diabetes Educator may make diabetes medication adjustments per the CDE Protocol and Collaborative Practice Agreement.            MED THERAPY MANAGE REFERRAL       Your provider has referred you to: **[facility] Medication Therapy Management Scheduling (numerous locations) [phone]   [email or web address]    Reason for Referral: new diagnoses of Diabetes      The [facility] Medication Therapy Management department will contact you to schedule an appointment.  You may also schedule the appointment by calling [phone].  For [facility] patients, please call [phone] to confirm/schedule your appointment on the next business day.    This service is designed to help you get the most from your medications.  A specially trained Pharmacist will work closely with you and your providers to  solve any questions, concerns, issues or problems related to your medications.    Please bring all of your prescription and non-prescription medications (such as vitamins, over-the-counter medications, and herbals) or a detailed medication list to your appointment.    If you have a glucose meter or other home monitoring information, please also bring this to your appointment (i.e. blood glucose log, blood pressure log, pain log, etc.).                  Your next 10 appointments already scheduled     May 16, 2018 12:45 PM CDT   RETURN GENERAL with Danya Pagan MD   [facility] ([facility])    [address]  [facility] [id number] [phone]            Jun 05, 2018  3:30 PM CDT   (Arrive by 3:15 PM)   Return Multiple Sclerosis with Fredo Sheffield MD   [facility] ([facility])    [address]  [address]  [facility] [id number] [phone]              Who to contact     If you have questions or need follow up information about today's clinic visit or your schedule please contact [facility] directly at [phone].  Normal or non-critical lab and imaging results will be communicated to you by MyChart, letter or phone within 4 business days after the clinic has received the results. If you do not hear from us within 7 days, please contact the clinic through [employer] or phone. If you have a critical or abnormal lab result, we will notify you by phone as soon as possible.  Submit refill requests through [employer] or call your pharmacy and they will forward the refill request to us. Please allow 3 business days for your refill to be completed.          Additional Information About Your Visit        [employer] Information     [employer] lets you send messages to your doctor, view your test results, renew your prescriptions, schedule appointments and more. To sign up, go to [email or web address] Click on \"Log in\" on the left side of the screen, which will take you to the Welcome page. Then click on \"Sign up Now\" on the right side of the page.     You will be asked to enter the access code listed below, as well as some personal information. Please follow the directions to create your username and password.     Your access code is: [email or web address]  Expires: 2018  6:30 AM     Your access code will  in 90 days. If you need help or a new code, please call your [facility] or [phone].        Care EveryWhere ID     This is your Care EveryWhere ID. This could be used by other organizations to access your [place] medical records  [id number]        Your Vitals Were     Pulse Temperature Respirations Height Last Period Pulse Oximetry    95 98.1  F (36.7  C) (Oral) 16 5' 1\" (1.549 m) 2018 98%    BMI (Body Mass Index)                   44.46 kg/m2            Blood Pressure from Last 3 Encounters:   18 126/88   18 132/80   01/15/18 (!) 145/92    Weight from Last 3 Encounters:   18 235 lb 4.8 oz (106.7 kg)   18 234 lb 12.8 oz (106.5 kg)   01/15/18 235 lb 9.6 oz (106.9 kg)              We Performed the Following     Albumin Random Urine Quantitative with Creat Ratio     C FOOT EXAM  NO CHARGE     DIABETES EDUCATOR REFERRAL     Glucose     Lipid panel reflex to direct LDL Fasting     MED THERAPY MANAGE REFERRAL          Today's Medication Changes          These changes are accurate as of 18  9:47 AM.  If you have any questions, ask your nurse or doctor.               Start taking these medicines.        Dose/Directions    blood glucose monitoring test strip   Commonly known as:  no brand specified   Used for:  Type 2 diabetes mellitus with hyperglycemia, without long-term current use of insulin (H)   Started by:  Shivani Clemons MD        Use to test blood sugars twice daily  times daily or as directed   Quantity:  100 strip   Refills:  3         These medicines " have changed or have updated prescriptions.        Dose/Directions    metFORMIN 500 MG 24 hr tablet   Commonly known as:  GLUCOPHAGE-XR   This may have changed:  See the new instructions.   Used for:  Type 2 diabetes mellitus with hyperglycemia, without long-term current use of insulin (H)   Changed by:  Shivani Clemons MD        Dose:  1000 mg   Take 2 tablets (1,000 mg) by mouth 2 times daily (with meals)   Quantity:  120 tablet   Refills:  3            Where to get your medicines      These medications were sent to [place] [address] [place] [facility] [place] [place] - [address] [facility]  [address], [place] [address]     Phone:  [phone]     blood glucose monitoring test strip    metFORMIN 500 MG 24 hr tablet    norgestrel-ethinyl estradiol 0.3-30 MG-MCG per tablet                Primary Care Provider Office Phone # Fax #    Shivani Clemons [phone] [address] [facility] [address]        Equal Access to Services     [facility]: Hadii deshawn lopezo Somark, waaxda [place], qaybta kaalmada katy, nilo ramos . So [facility] [phone].    ATENCIÓN: Si josela español, tiene a murphy disposición servicios gratuitos de asistencia lingüística. Llame al [phone].    We comply with applicable federal civil rights laws and [place] laws. We do not discriminate on the basis of race, color, national origin, age, disability, sex, sexual orientation, or gender identity.            Thank you!     Thank you for choosing [facility]  for your care. Our goal is always to provide you with excellent care. Hearing back from our patients is one way we can continue to improve our services. Please take a few minutes to complete the written survey that you may receive in the mail after your visit with us. Thank you!             Your Updated Medication List - Protect others around you: Learn how to safely use, store and throw away your medicines at  www.disposemymeds.org.          This list is accurate as of 5/9/18  9:47 AM.  Always use your most recent med list.                   Brand Name Dispense Instructions for use Diagnosis    blood glucose monitoring test strip    no brand specified    100 strip    Use to test blood sugars twice daily  times daily or as directed    Type 2 diabetes mellitus with hyperglycemia, without long-term current use of insulin (H)       cholecalciferol 1000 UNIT tablet    vitamin D3    90 tablet    Take one tablet daily, starting after you have finished the once a week dosing of the 50,000 Units dose.    MS (multiple sclerosis) (H)       ibuprofen 200 MG tablet    ADVIL/MOTRIN     Take 2-3 tablets by mouth as needed        Interferon Beta-1a 44 MCG/0.5ML Soaj     12 Syringe    Inject 44 mcg Subcutaneous three times a week    Multiple sclerosis (H)       metFORMIN 500 MG 24 hr tablet    GLUCOPHAGE-XR    120 tablet    Take 2 tablets (1,000 mg) by mouth 2 times daily (with meals)    Type 2 diabetes mellitus with hyperglycemia, without long-term current use of insulin (H)       norgestrel-ethinyl estradiol 0.3-30 MG-MCG per tablet    LO/OVRAL    90 tablet    Take 1 tablet by mouth daily    Family planning, BCP (birth control pills) maintenance       order for DME     1 Device    Equipment being ordered: nebulizer with tubing    Wheezing       zolpidem 5 MG tablet    AMBIEN    30 tablet    Take 1 tablet (5 mg) by mouth nightly as needed for sleep    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

## 2018-05-09 NOTE — PATIENT INSTRUCTIONS
Diabetic eye exam annually  Am/ premeals glucose up to 120  2 hour post meals ok up to 160  Increase metformin to 2000 mg once daily, ok to divide it in 1000 mg twice daily     Return office visit in 3 months      Goals for Your Diabetes Care  A1c   Goal:  Less than 7 percent--ask your doctor if this is right for you  Check it: Every 3 to 6 months  Why:  Shows how well your blood glucose was controlled over the past 3 months  Blood pressure   Goal: Under 140/90  Check it:  At every clinic check up for diabetes  Why:  May prevent stroke, heart disease and eye and kidney diseases  Cholesterol   Goal:  Discuss cholesterol management with your doctor, and consider taking a statin medicine  Check it:  Every year  Why:  Helps prevent heart attacks and stroke  Kidney test (urine microalbumin)  Goal:  Under 30  Do it:  Every year  Why:  Finds kidney problems before they get worse  Foot exam   Goal:  No cuts or sores, normal sensation  Do it: Remove shoes and socks at every visit; have a monofilament test every year  Why: Finds foot problems before they get worse  Eye exam   Goal:  No changes in your eyes  Do it: Every year  Why:  Finds eye problems before they get worse  Exercise  Goal:  150 minutes of aerobic exercises and 2 to 3 sessions of strength training  Do it: Every week  Why:  Helps manage diabetes and improve health  Aspirin  Goal:  If you are age 50 or older, ask your doctor if you should take aspirin  Take it: Every day, or as prescribed  Why: Lowers the risk of heart attack and stroke  Smoking and tobacco  Goal: No tobacco use  Why: Tobacco is a major risk for heart disease  Diabetes education  Goal: Learn how to manage your diabetes  Do it: At least once a year--ask your doctor for a referral  Why: The more you know, the better you can manage your diabetes  Your goals may be different from what you see here. Your care team will tell you what your goals should be.   For informational purposes only. Not to  "replace the advice of your health care provider.   Copyright   2009 [facility], [place]. All rights reserved. [employer] [id number] - Rev 01/16.    Diabetes with High Blood Sugar  You have been treated for high blood sugar (hyperglycemia). This may be because of an infection or other illness, eating too many sweets or starches, or not taking enough insulin.  Home care  Monitor and write down your blood sugar level at least twice a day. Do this before breakfast and before dinner. If you take insulin, record your routine insulin dose as well. Also record any additional doses required based on your sliding scale. Do this for the next 3 to 5 days.  High blood sugar may cause symptoms that you can learn to recognize, such as:    Frequent urination    Thirst    Dizziness    Headache    Shortness of breath    Breath that smells fruity    Nausea or vomiting    Abdominal pain    Drowsiness or loss of consciousness  If you have high-blood-sugar symptoms, use a blood or urine test to find out what your blood sugar level is. If it is above your usual range, use the \"sliding scale\" regular insulin dose prescribed by your healthcare provider. If you were not given a range for your insulin dose, contact your healthcare provider for more advice.  Follow-up care  Follow up with your healthcare provider, or as advised. You may need to meet with your healthcare provider in the next week. You will likely review your blood sugar records together. You may also talk to your provider about adjusting your dose of insulin or other medicine for blood sugar.  When to seek medical advice  Call your healthcare provider right away if these occur:    High blood sugar symptoms (Symptoms are described above.)    Blood sugar over 300 mg/dl If you can t reach your healthcare provider, go to a hospital emergency room or urgent care center  Date Last Reviewed: 6/1/2016 2000-2017 [employer]. [address] [place] [address], [place] [address]. " All rights reserved. This information is not intended as a substitute for professional medical care. Always follow your healthcare professional's instructions.        Diet: Diabetes  Food is an important tool that you can use to control diabetes and stay healthy. Eating well-balanced meals in the correct amounts will help you control your blood glucose levels and prevent low blood sugar reactions. It will also help you reduce the health risks of diabetes. There is no one specific diet that is right for everyone with diabetes. But there are general guidelines to follow. A registered dietitian (ANNA) will create a tailored diet approach that s just right for you. He or she will also help you plan healthy meals and snacks. If you have any questions, call your dietitian for advice.     Guidelines for success  Talk with your healthcare provider before starting a diabetes diet or weight loss program. If you haven't talked with a dietitian yet, ask your provider for a referral. The following guidelines can help you succeed:    Select foods from the 6 food groups below. Your dietitian will help you find food choices within each group. He or she will also show you serving sizes and how many servings you can have at each meal.  ? Grains, beans, and starchy vegetables  ? Vegetables  ? Fruit  ? Milk or yogurt  ? Meat, poultry, fish, or tofu  ? Healthy fats    Check your blood sugar levels as directed by your provider. Take any medicine as prescribed by your provider.    Learn to read food labels and pick the right portion sizes.    Eat only the amount of food in your meal plan. Eat about the same amount of food at regular times each day. Don t skip meals. Eat meals 4 to 5 hours apart, with snacks in between.    Limit alcohol. It raises blood sugar levels. Drink water or calorie-free diet drinks that use safe sweeteners.    Eat less fat to help lower your risk of heart disease. Use nonfat or low-fat dairy products and lean meats.  Avoid fried foods. Use cooking oils that are unsaturated, such as olive, canola, or peanut oil.    Talk with your dietitian about safe sugar substitutes.    Avoid added salt. It can contribute to high blood pressure, which can cause heart disease. People with diabetes already have a risk of high blood pressure and heart disease.    Stay at a healthy weight. If you need to lose weight, cut down on your portion sizes. But don t skip meals. Exercise is an important part of any weight management program. Talk with your provider about an exercise program that s right for you.    For more information about the best diet plan for you, talk with a registered dietitian (RD). To find an RD in your area, contact:  ? Academy of Nutrition and Dietetics www.eatright.org  ? The American Diabetes Association 784-957-1829 www.diabetes.org  Date Last Reviewed: 8/1/2016 2000-2017 [employer]. [address] [place] [address]. All rights reserved. This information is not intended as a substitute for professional medical care. Always follow your healthcare professional's instructions.        Eating Out When You Have Diabetes  Eating right is an important part of keeping your blood sugar in your target range. You just need to make healthy choices.    Tips for restaurant meals  When you eat away from home try these tips:    Try to schedule your dining-out meal at your normal meal time. Make a reservation if possible, so you don't have to wait to eat. If you can't make a reservation, try to arrive at the restaurant at a less-busy time to cut down your wait time. Eat a small fruit or starch snack at your regular mealtime if your restaurant meal is going to be later than usual.     Call ahead to see if the restaurant can meet your dietary needs if you've never been there before. Or you can go online to see the menu ahead of time.    Carry some crackers with you in case the restaurant needs you to wait until you can  be served.    Ask how foods are prepared before you order.    Instead of fried, sautéed, or breaded foods, choose ones that are broiled, steamed, grilled, or baked.    Ask for sauces, gravies, and dressings on the side.    Only eat an amount that fits your meal plan. Remember: You can take home the leftovers.    Save dessert for special occasions. Then choose a small dessert or share one with a friend or family member.  Make healthy choices  Fast food    Garden salad with light dressing on the side    Baked potato with vegetables or herbs    Broiled, roasted, or grilled chicken sandwich    Sliced turkey or lean roast beef sandwich  Mexican    Chicken enchilada, without cheese or sour cream     Small burrito with whole beans and chicken    Whole beans (not refried) and rice    Chicken or fish fajitas  Steakhouse    Grilled or broiled lean cuts of beef    Baked potato with vegetables or herbs    Broiled or baked chicken. Don t eat the skin.    Steamed vegetables  Asian    Steamed dumplings or potstickers    Broiled, boiled, or steamed meats or fish    Sushi or sashimi    Steamed rice or boiled noodles. One serving is equal to 1/3 cup.  Date Last Reviewed: 6/1/2016 2000-2017 [employer]. [address] [place] [address]. All rights reserved. This information is not intended as a substitute for professional medical care. Always follow your healthcare professional's instructions.        Before You Start Your Diabetes Exercise Plan  Fitness plays a special role for people who have diabetes. Being fit means becoming healthier by adding activity to your day. Talk to your healthcare provider before getting started. He or she may want you to have a checkup before you become more active. Also, having certain tests first helps you and your healthcare provider learn how you will respond to a fitness program.    Your checkup  A medical checkup helps ensure that your fitness plan will bring you the most  benefit. It also keeps at a minimum the risks that may come with physical activity. As part of your checkup:    You may have a test called a hemoglobin A1C. The A1C test measures your average glucose (sugar) level over 2 to 3 months. A1C is usually given as a percentage. But it is now also given as a number representing estimated Average Glucose (eAG). Unlike the A1C percentage, eAG gives you a number similar to the numbers listed on your daily glucose monitor.    Your healthcare provider may check the health of your heart with a resting electrocardiogram. Diabetes can cause heart problems. But a fitness program can help these problems get better. Being fit can also help improve your cholesterol and blood pressure levels.    Your healthcare provider may check the health of your feet. People who have diabetes can have problems with their feet. Your healthcare provider can check your feet before you become more active. Take time to find shoes that will support your feet well while you exercise.   If you have an exercise stress test  An exercise stress test can show how your heart responds to activity and what level of exercise is right for you. You will have small electrodes placed on your chest. You will then walk on a treadmill or ride an exercise bike while your heart rate is monitored. If you have this test, your healthcare provider will give you more details.     Date Last Reviewed: 7/1/2016 2000-2017 [employer]. [address] [place] [address], [place] [address]. All rights reserved. This information is not intended as a substitute for professional medical care. Always follow your healthcare professional's instructions.        Diabetes: Tracking Your Fitness Progress    Tracking your fitness progress can help you improve your long-term health. Seeing how far you ve come may motivate you to achieve more. Your doctor can also use a record of any progress to help plan your treatment.  Recording blood sugar  levels  Your healthcare provider may have shown you how to check your blood sugar. Now that you re more active, you may need to check it more often. Keep a blood sugar log. That way, you can see how your efforts are paying off. You may also include a column for blood sugar readings in a fitness log (see Keeping a Fitness Log below). Bring any log books with you on healthcare provider visits. Your healthcare provider can use these records to help decide whether to adjust your medications.  Setting a fitness goal  A fitness goal gives you something to reach for. Set a goal you can achieve. It does no good if your goal is beyond your ability. And choose a goal that focuses on action. For instance, your first goal may be to take two 10-minute walks a day for one week. After you reach your first goal, try making the next one more challenging. Invite a friend to exercise with you so you can encourage each other to strive toward your goals.   Keeping a fitness log  Include the information that matters most to you in your fitness log. This may be how you felt before, during, or after exercising. And don t forget to record your blood sugar reading. As time goes on, compare your first entry with more recent entries. You may see a rise in your fitness level and a drop in your blood sugar.  Your fitness reward  Your chances of reaching a goal increase if you plan a reward. Write down a nonfood reward that matters to you. For instance, you might reward yourself with a night at the movies, a new warm-up suit, or some relaxing music.  Date Last Reviewed: 7/1/2016 2000-2017 [employer]. [address] [place] [address], [place] [address]. All rights reserved. This information is not intended as a substitute for professional medical care. Always follow your healthcare professional's instructions.

## 2018-05-09 NOTE — NURSING NOTE
"Chief Complaint   Patient presents with     RECHECK     f/u on A1C     Refill Request     OCP     /88  Pulse 95  Temp 98.1  F (36.7  C) (Oral)  Resp 16  Ht 5' 1\" (1.549 m)  Wt 235 lb 4.8 oz (106.7 kg)  LMP 05/06/2018  SpO2 98%  BMI 44.46 kg/m2 Estimated body mass index is 44.46 kg/(m^2) as calculated from the following:    Height as of this encounter: 5' 1\" (1.549 m).    Weight as of this encounter: 235 lb 4.8 oz (106.7 kg).        Health Maintenance due pending provider review:  NONE    n/a    Constanza Gan CMA  "

## 2018-05-11 ENCOUNTER — TELEPHONE (OUTPATIENT)
Dept: PHARMACY | Facility: OTHER | Age: 38
End: 2018-05-11

## 2018-05-11 NOTE — TELEPHONE ENCOUNTER
MTM referral from: [facility] visit (referral by provider)    MTM referral outreach attempt #2 on May 11, 2018 at 3:32 PM      Outcome: Patient not reachable after several attempts, will route to MTM Pharmacist/Provider as an FYI. Thank you for the referral.    Britt Chen, MTM Coordinator

## 2018-05-12 ENCOUNTER — TELEPHONE (OUTPATIENT)
Dept: FAMILY MEDICINE | Facility: CLINIC | Age: 38
End: 2018-05-12

## 2018-05-12 ENCOUNTER — NURSE TRIAGE (OUTPATIENT)
Dept: NURSING | Facility: CLINIC | Age: 38
End: 2018-05-12

## 2018-05-12 DIAGNOSIS — E11.9 DIABETES MELLITUS (H): Primary | ICD-10-CM

## 2018-05-12 NOTE — TELEPHONE ENCOUNTER
Pharmacy calling to get new script for one Touch Verio Felx glucose meter much cheaper than other monitor, they can give to pt for free, and supplies are much cheaper. Meets refill protcol and submitted script for new glucose meter via e-scribe.     Jes Fonseca RN, BSN  [facility]

## 2018-05-14 NOTE — PROGRESS NOTES
Send lab & letter       Hi     I called to discuss fasting lipid & left a brief Voice message as well    Good cholesterol is low at 39- it does improve with excercise  Bad cholesterol is 96,that is ok- & the recommendation for starting cholesterol lowering medications are driven in Diabetes  by additional cardiovascular risk factor- such as weight in your cse  Its  Best to start atorvastatin at 20 mg dose- to improve the risk.  I Would like to discuss it on phone appointment ,before sending the prescription, so we can talk about what to expect with the medication ,benefits &  side effects     Negative albumin in urine- that's great    Thanks

## 2018-05-15 ENCOUNTER — TELEPHONE (OUTPATIENT)
Dept: FAMILY MEDICINE | Facility: CLINIC | Age: 38
End: 2018-05-15

## 2018-05-15 DIAGNOSIS — E11.65 TYPE 2 DIABETES MELLITUS WITH HYPERGLYCEMIA, WITHOUT LONG-TERM CURRENT USE OF INSULIN (H): Primary | ICD-10-CM

## 2018-05-15 NOTE — TELEPHONE ENCOUNTER
Reason for Call:  Other call back    Detailed comments: Pt called to the clinic to return dr. Clemons phone call from yesterday to talk about lab results  Please call pt back    Phone Number Patient can be reached at: [phone]    Best Time: Any time    Can we leave a detailed message on this number? YES    Call taken on 5/15/2018 at 9:40 AM by Ju Olivares

## 2018-05-15 NOTE — TELEPHONE ENCOUNTER
AS  Reviewed lab results and would like to meet with diabetes educator before deciding to go on any medication.    Pt has increased metformin on Saturday to 2000 mg BID, but has noticed her BG is still a little high in the AM. Strange thing is that she has really been watching her carb intake. Last night she had eggs, almonds and cheese and this morning her BG was 170 which is higher than she has seen in a while. Should she had noticed results from the increase of the metformin by now? Seems as if the lower the sugar intake the higher her blood sugars are in the morning. BG control is pretty good during the day.     Please advise,  Jyotsna Vora, RN

## 2018-05-17 NOTE — TELEPHONE ENCOUNTER
I have left a voice message about ok to hold atorvastatin    I have placed Diabetes  Educator consult & best to get comprehensive  diet advice( I think they call- her)  OK to hold off on atorvastatin  Continue with metformin-BS will continue to improve, too early to notice improvement with gradual increase of the dose of metformin  Keep up the good work

## 2018-05-18 NOTE — TELEPHONE ENCOUNTER
Patient has diabetes education appt scheduled for 5/25/2018  Pt hasn't called back about below  Left detailed VM for patient and asked that she callback with questions.  Cherelle ADAN RN

## 2018-05-24 DIAGNOSIS — R73.9 HYPERGLYCEMIA, DRUG-INDUCED: ICD-10-CM

## 2018-05-24 DIAGNOSIS — T50.905A HYPERGLYCEMIA, DRUG-INDUCED: ICD-10-CM

## 2018-05-24 RX ORDER — METFORMIN HCL 500 MG
TABLET, EXTENDED RELEASE 24 HR ORAL
Start: 2018-05-24

## 2018-05-24 NOTE — TELEPHONE ENCOUNTER
"Duplicate  Rx sent 5/9/18 for #120 with 3 refills.  Candace Wong, MICHAEL  Requested Prescriptions   Refused Prescriptions Disp Refills     metFORMIN (GLUCOPHAGE-XR) 500 MG 24 hr tablet [Pharmacy Med Name: METFORMIN  MG TABLET]       Sig: TAKE 2 TABLETS (1,000 MG) BY MOUTH DAILY (WITH DINNER)    Biguanide Agents Passed    5/24/2018  1:18 AM       Passed - Blood pressure less than 140/90 in past 6 months    BP Readings from Last 3 Encounters:   05/09/18 126/88   01/29/18 132/80   01/15/18 (!) 145/92                Passed - Patient is age 10 or older       Passed - Recent (12 mo) or future (30 days) visit within the authorizing provider's specialty     Patient had office visit in the last 12 months or has a visit in the next 30 days with authorizing provider or within the authorizing provider's specialty.  See \"Patient Info\" tab in inbasket, or \"Choose Columns\" in Meds & Orders section of the refill encounter.           Passed - Patient does NOT have a diagnosis of CHF.       Passed - Patient is not pregnant       Passed - Patient has not had a positive pregnancy test within the past 12 mos.           "

## 2018-05-25 ENCOUNTER — ALLIED HEALTH/NURSE VISIT (OUTPATIENT)
Dept: EDUCATION SERVICES | Facility: CLINIC | Age: 38
End: 2018-05-25
Payer: COMMERCIAL

## 2018-05-25 DIAGNOSIS — E11.9 DIABETES MELLITUS, TYPE 2 (H): Primary | ICD-10-CM

## 2018-05-25 NOTE — PROGRESS NOTES
DIABETES SELF MANAGEMENT EDUCATION: Initial Assessment Visit for Newly Diagnosed Patients (Complete AADE Goals Flowsheet)    Alejandra Forbes presents today for education related to Type 2 diabetes.    She is accompanied by self  Referring Provider: Farhad Clemons MD    DIABETES RELATED CONCERNS/COMMENTS: both father and grandfather  of complications, not dying at a young age  Patient's emotional response to diabetes: expresses readiness to learn and concern for health and well-being    Past Diabetes Education: Yes (has PCOS)  Support system: spouse/significant other  Living Situation: lives with spouse/significant other  Occupation: Works for [employer]    ASSESSMENT:  Patient Problem List and Medication List reviewed for relevant medical history, current medical status, and diabetes risk factors.    Current Diabetes Management per Patient:  Diabetes medications: YES, Oral Medications: Metformin 500 mg twice a day, (Good about remembering to take meds)  At risk for hypoglycemia? Yes , Symptoms: Weakness and Frequency: rare  BG meter: One Touch Verio meter  Patient glucose self monitoring as follows: 5 times daily.   BG results: range  mg/dl on the Metformin 2000 mg  BG values are: unable to assess  Patient's most recent A1C 6.5% on 18    Nutrition:  Patient eats 3 meals per day, has already started counting carbs   Beverages: water, sparkling water, diet coke, coffee  Cultural/Restorationist diet restrictions: no, does not use sugar substitutes  Biggest Challenge to Healthy Eating: being an American    Breakfast: (6a) eggs, peppers onions, cheese wedges, oatmeal, coffee with Cream and Stevia  Snack:  Lunch:(11-1p) Chipot[employer] Chicken salad, yogurt, hard boiled eggs, fruit, diet Coke, water  Snack: coffee,   Dinner: (5-7p) rice, black beans, meat, almond milk, sparkling water  Snack: yogurt or trail mix      Physical Activity:     moderate regular exercise program which includes swimming, elliptical,  yoga (20-45 minutes)    Diabetes Complications:  Not discussed today.    Diabetes knowledge and skills assessment:     Patient is knowledgeable in diabetes management concepts related to: Healthy Eating  Barriers to Learning Assessment: No Barriers identified    Based on learning assessment above, most appropriate setting for further diabetes education would be: Individual setting.    Education provided today on:  Topics that were covered in today's visit:    Basic pathophysiology of T2DM, relationship between carbohydrate intake, release of glucose from the liver, exercise and weight management on glucose control.    Target blood glucose for pre-meal and 2 hour post-prandial glucose    Action, timing and potential side-effects of diabetes medications: Medtromin    Basic meal planning using the plate method, emphasizing portion control, healthy food choices and eliminating or minimizing sugared beverages.    Need for consistent physical activity, 30-45 minutes duration, preferably 4-6 days per week.     Pt verbalized understanding of concepts discussed and recommendations provided today.       Education Materials Provided:  Carbohydrate Counting, Where do I Begin, Healthy Snacks for your meal plan    PLAN:  Patient needs further education on the following diabetes management concepts: Taking Medication    See Patient Instructions for co-developed, patient-stated behavior change goals.  AVS printed and provided to patient today.    FOLLOW-UP:  Follow-up appointment scheduled on 6/22/18 at 1:30 and 2:30 RN/RD.  Chart routed to referring provider.    Ongoing plan for education and support: Written resources (magazines, books, etc.)  Time Spent: 60 minutes  Encounter Type: Individual    Any diabetes medication dose changes were made via the CDE Protocol and Collaborative Practice Agreement with  Physicians.

## 2018-05-25 NOTE — MR AVS SNAPSHOT
After Visit Summary   5/25/2018    Alejandra Forbes    MRN: [id number]           Patient Information     Date Of Birth          1980        Visit Information        Provider Department      5/25/2018 3:30 PM Maya Booker RN [facility] Diabetes        Care Instructions    1.  Test your blood sugar twice a day.  Your blood sugar goals are:  Before meals:   mg/dl  2 hours after a meal:  <180    2.  Try to follow the following meal plan.  Meals:  30-45 grams  Snacks: 15-30 grams.    3.  Keep working out at [employer] for 30 minutes three times a week.    4.  Return:  6/22/18  Maya at 2:30 pm Dinorah 1:30 pm    Maya Booker RN,CDE  [address] [address]  Phone: [phone] or [phone]  [email or web address]              Follow-ups after your visit        Your next 10 appointments already scheduled     May 30, 2018  1:00 PM CDT   RETURN GENERAL with Danya Pagan MD   Eye Clinic ([facility])    [address]  [facility]  [facility] [id number] [phone]            Jun 05, 2018  3:30 PM CDT   (Arrive by 3:15 PM)   Return Multiple Sclerosis with Fredo Sheffield MD   [facility] Multiple Sclerosis ([facility])    [address] [place] [address]  [address] [place] [address] [phone]              Who to contact     Please call your clinic at [phone] to:    Ask questions about your health    Make or cancel appointments    Discuss your medicines    Learn about your test results    Speak to your doctor            Additional Information About Your Visit        MyChart Information     [employer] is an electronic gateway that provides easy, online access to your medical records. With [employer], you can request a clinic appointment, read your test results, renew a prescription or communicate with your care team.     To sign up for [employer] visit the website at  [email or web address]   You will be asked to enter the access code listed below, as well as some personal information. Please follow the directions to create your username and password.     Your access code is: [email or web address]  Expires: 2018  6:30 AM     Your access code will  in 90 days. If you need help or a new code, please contact your [facility] or call [phone] for assistance.        Care EveryWhere ID     This is your Care EveryWhere ID. This could be used by other organizations to access your [facility] medical records  [id number]        Your Vitals Were     Last Period                   2018            Blood Pressure from Last 3 Encounters:   18 126/88   18 132/80   01/15/18 (!) 145/92    Weight from Last 3 Encounters:   18 106.7 kg (235 lb 4.8 oz)   18 106.5 kg (234 lb 12.8 oz)   01/15/18 106.9 kg (235 lb 9.6 oz)              Today, you had the following     No orders found for display       Primary Care Provider Office Phone # Fax #    Shivani Clemons [phone] [address] [facility] [address]        Equal Access to Services     DAVION BLUM : Hadii aad ku hadasho Soomaali, waaxda luqadaha, qaybta kaalmada adeegyada, nilo idiin hayaan ceasar ramos . So [facility] [phone].    ATENCIÓN: Si habla español, tiene a murphy disposición servicios gratuitos de asistencia lingüística. Llame al [phone].    We comply with applicable federal civil rights laws and [place] laws. We do not discriminate on the basis of race, color, national origin, age, disability, sex, sexual orientation, or gender identity.            Thank you!     Thank you for choosing [facility] DIABETES  for your care. Our goal is always to provide you with excellent care. Hearing back from our patients is one way we can continue to improve our services. Please take a few minutes to complete the written survey that you may  receive in the mail after your visit with us. Thank you!             Your Updated Medication List - Protect others around you: Learn how to safely use, store and throw away your medicines at www.disposemymeds.org.          This list is accurate as of 5/25/18  4:22 PM.  Always use your most recent med list.                   Brand Name Dispense Instructions for use Diagnosis    ACE/ARB/ARNI NOT PRESCRIBED (INTENTIONAL)      Please choose reason not prescribed, below    Type 2 diabetes mellitus with hyperglycemia, without long-term current use of insulin (H)       Blood Glucose Monitoring Suppl Gauri     1 each    1 Device as needed One Touch Verio Flex glucose monitor    Diabetes mellitus (H)       blood glucose monitoring test strip    no brand specified    100 strip    Use to test blood sugars twice daily  times daily or as directed    Type 2 diabetes mellitus with hyperglycemia, without long-term current use of insulin (H)       cholecalciferol 1000 UNIT tablet    vitamin D3    90 tablet    Take one tablet daily, starting after you have finished the once a week dosing of the 50,000 Units dose.    MS (multiple sclerosis) (H)       ibuprofen 200 MG tablet    ADVIL/MOTRIN     Take 2-3 tablets by mouth as needed        Interferon Beta-1a 44 MCG/0.5ML Soaj     12 Syringe    Inject 44 mcg Subcutaneous three times a week    Multiple sclerosis (H)       metFORMIN 500 MG 24 hr tablet    GLUCOPHAGE-XR    120 tablet    Take 2 tablets (1,000 mg) by mouth 2 times daily (with meals)    Type 2 diabetes mellitus with hyperglycemia, without long-term current use of insulin (H)       norgestrel-ethinyl estradiol 0.3-30 MG-MCG per tablet    LO/OVRAL    90 tablet    Take 1 tablet by mouth daily    Family planning, BCP (birth control pills) maintenance       order for DME     1 Device    Equipment being ordered: nebulizer with tubing    Wheezing       zolpidem 5 MG tablet    AMBIEN    30 tablet    Take 1 tablet (5 mg) by mouth nightly  as needed for sleep    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

## 2018-05-25 NOTE — PATIENT INSTRUCTIONS
1.  Test your blood sugar twice a day.  Your blood sugar goals are:  Before meals:   mg/dl  2 hours after a meal:  <180    2.  Try to follow the following meal plan.  Meals:  30-45 grams  Snacks: 15-30 grams.    3.  Keep working out at the [employer] for 30 minutes three times a week.    4.  Return:  6/22/18  Maya at 2:30 pm Dinorah 1:30 pm    Maya Booker RN,[place] [address]  Phone: [phone] or [phone]  [email or web address]

## 2018-05-30 ENCOUNTER — OFFICE VISIT (OUTPATIENT)
Dept: OPHTHALMOLOGY | Facility: CLINIC | Age: 38
End: 2018-05-30
Attending: OPHTHALMOLOGY
Payer: COMMERCIAL

## 2018-05-30 DIAGNOSIS — H35.353 CYSTOID MACULAR EDEMA OF BOTH EYES: Primary | ICD-10-CM

## 2018-05-30 PROCEDURE — 92134 CPTRZ OPH DX IMG PST SGM RTA: CPT | Mod: ZF | Performed by: INTERNAL MEDICINE

## 2018-05-30 PROCEDURE — G0463 HOSPITAL OUTPT CLINIC VISIT: HCPCS | Mod: ZF

## 2018-05-30 RX ORDER — PREDNISOLONE ACETATE 10 MG/ML
1 SUSPENSION/ DROPS OPHTHALMIC 3 TIMES DAILY
Qty: 1 BOTTLE | Refills: 0 | Status: SHIPPED | OUTPATIENT
Start: 2018-05-30 | End: 2018-05-30

## 2018-05-30 RX ORDER — PREDNISOLONE ACETATE 10 MG/ML
1 SUSPENSION/ DROPS OPHTHALMIC 3 TIMES DAILY
Qty: 1 BOTTLE | Refills: 3 | Status: SHIPPED | OUTPATIENT
Start: 2018-05-30 | End: 2018-08-01

## 2018-05-30 RX ORDER — KETOROLAC TROMETHAMINE 4 MG/ML
1 SOLUTION/ DROPS OPHTHALMIC 2 TIMES DAILY
Qty: 1 BOTTLE | Refills: 11 | Status: SHIPPED | OUTPATIENT
Start: 2018-05-30 | End: 2018-08-01

## 2018-05-30 ASSESSMENT — VISUAL ACUITY
OS_SC: 20/150
OS_PH_SC: 20/60-2
METHOD: SNELLEN - LINEAR
OD_SC: 20/200
OD_PH_SC: 20/60-2

## 2018-05-30 ASSESSMENT — SLIT LAMP EXAM - LIDS
COMMENTS: NORMAL
COMMENTS: NORMAL

## 2018-05-30 ASSESSMENT — TONOMETRY
OS_IOP_MMHG: 8
IOP_METHOD: ICARE
OD_IOP_MMHG: 7

## 2018-05-30 ASSESSMENT — CONF VISUAL FIELD
OD_NORMAL: 1
OS_NORMAL: 1

## 2018-05-30 ASSESSMENT — EXTERNAL EXAM - LEFT EYE: OS_EXAM: NORMAL

## 2018-05-30 ASSESSMENT — CUP TO DISC RATIO
OD_RATIO: 0.2
OS_RATIO: 0.2

## 2018-05-30 ASSESSMENT — EXTERNAL EXAM - RIGHT EYE: OD_EXAM: NORMAL

## 2018-05-30 NOTE — PROGRESS NOTES
HPI  Alejandra Forbes is a 37 year old female with history of multiple sclerosis with history of uveitis, left eye who presents today for follow up cystoid macular edema and intermediate uveitis.     Interval history: No changes in vision since last appointment. Continues to be photophobic however this has not significantly changed. She is now off interferon gamma. Patient has appointment with neurology next week    Medications:  No ocular medications     Testing:   OCT Macula both eyes 05/30/18:  OD mild epiretinal membrane with some mild retinal distortion.   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   - previous history of ocular steroid and oral prednisone use with steroid response.     Plan:   -Discussed findings of new intraretinal swelling with patient and options for treatment. Patient does not wish to have steroid injections or oral steroid at this time. Significant steroid side effects in the past with their use. Additionally patient newly diagnosed diabetic and remembers steroids have caused glucose lability in past. Patient is willing to try topical therapy.    -Discussed use of topical steroid, and ketrolac therapy left eye    -Start prednisolone three times a day start ketorolac twice a day    -return to clinic 2-3 months with f/u oct macula to monitor for improvement with retina. Consider antivegf therapy if unimproved.    -Explained risk of permanent vision loss due to progressively worsening cystoid macular edema. Risk of ftmh and further photoreceptor damage that could result in permanent visual loss and worsen visual acuity.     2)Uveitic vs steroid responder Glaucoma -- s/p Trab OU in 2006 (Dr. Edward), +steroid  responder -- K pachy:    Tmax: 60s per patient (on steroid prior to Trab OU)    HVF: Full in 2009     CDR:     HRT/OCT: RNFL WNL In 2015      FHX of Glc: Yes, grandmother on gtts     Gonio:       Intolerant to:      Asthma/COPD: Yes, on inhalers  Steroid Use: Yes, for CME    Kidney Stones:  No   Sulfa Allergy:  No    IOP targets:  -IOP in good range s/p trabeculectomy both eyes     3)PCIOL left eye   4)PSC OD- visually significant patient would like to observe.   6)Multiple Sclerosis -- following with Neurology  7)H/O ERM -- following with Dr. De Jesus       -return to clinic 2-3 months with f/u oct macula to monitor for improvement with retina. Consider antivegf therapy if unimproved.     Danya Pagan MD  Ophthalmology PGY3    Teaching statement:  Complete documentation of historical and exam elements from today's encounter can be found in the full encounter summary report (not reduplicated in this progress note). I personally obtained the chief complaint(s) and history of present illness.  I confirmed and edited as necessary the review of systems, past medical/surgical history, family history, social history, and examination findings as documented by others; and I examined the patient myself. I personally reviewed the relevant tests, images, and reports as documented above.     I formulated and edited as necessary the assessment and plan and discussed the findings and management plan with the patient and family.    Kayy Enciso MD  Comprehensive Ophthalmology & Ocular Pathology  Department of Ophthalmology and Visual Neurosciences  [email or web address]  Pager [phone]

## 2018-05-30 NOTE — MR AVS SNAPSHOT
After Visit Summary   5/30/2018    Alejandra Forbes    MRN: [id number]           Patient Information     Date Of Birth          1980        Visit Information        Provider Department      5/30/2018 1:00 PM Danya Pagan MD [facility] Clinic        Today's Diagnoses     Cystoid macular edema of both eyes    -  1       Follow-ups after your visit        Follow-up notes from your care team     Return in about 2 months (around 7/30/2018) for Follow Up 2-3 months with Retina.      Your next 10 appointments already scheduled     May 31, 2018  1:30 PM CDT   Return Visit with Lane Serrato DPM   [facility]    [address] [facility] [address]   [phone]            Jun 05, 2018  3:30 PM CDT   (Arrive by 3:15 PM)   Return Multiple Sclerosis with Fredo Sheffield MD   [facility] Multiple Sclerosis ([facility])    [address] [place] [id number] [phone]            Jun 22, 2018  1:30 PM CDT   (Arrive by 1:15 PM)   Office Visit with Dinorah Carrillo RD   [facility] Diabetes ([facility])    [address] [place] [phone] [phone]           Bring a current list of meds and any records pertaining to this visit. For Physicals, please bring immunization records and any forms needing to be filled out. Please arrive 10 minutes early to complete paperwork.            Jun 22, 2018  2:30 PM CDT   (Arrive by 2:15 PM)   Office Visit with Maya Booker RN   [facility] Diabetes ([facility])    [address] [place] [id number] [phone]           Bring a current list of meds and any records pertaining to this visit. For Physicals, please bring immunization records and any forms needing to be filled out. Please arrive 10 minutes early to complete paperwork.            Jul 25, 2018  7:45 AM CDT    RETURN RETINA with Genaro De Jesus MD   Eye Clinic ([facility])    [address]  [address] [address]   [phone]              Who to contact     Please call your clinic at [phone] to:    Ask questions about your health    Make or cancel appointments    Discuss your medicines    Learn about your test results    Speak to your doctor            Additional Information About Your Visit        My[employer]Ravgen Information     [employer] is an electronic gateway that provides easy, online access to your medical records. With [employer], you can request a clinic appointment, read your test results, renew a prescription or communicate with your care team.     To sign up for [employer] visit the website at [email or web address]   You will be asked to enter the access code listed below, as well as some personal information. Please follow the directions to create your username and password.     Your access code is: [email or web address]  Expires: 2018  6:30 AM     Your access code will  in 90 days. If you need help or a new code, please contact your [facility] or call [phone] for assistance.        Care EveryWhere ID     This is your Care EveryWhere ID. This could be used by other organizations to access your [facility] medical records  [id number]        Your Vitals Were     Last Period                   2018            Blood Pressure from Last 3 Encounters:   18 126/88   18 132/80   01/15/18 (!) 145/92    Weight from Last 3 Encounters:   18 106.7 kg (235 lb 4.8 oz)   18 106.5 kg (234 lb 12.8 oz)   01/15/18 106.9 kg (235 lb 9.6 oz)              We Performed the Following     OCT Retina Spectralis OU (both eyes)          Today's Medication Changes          These changes are accurate as of 18  3:04 PM.  If you have any questions, ask your nurse or doctor.               Start taking  these medicines.        Dose/Directions    ketorolac tromethamine 0.4 % Soln ophthalmic solution   Commonly known as:  ACULAR-LS   Used for:  Cystoid macular edema of both eyes   Started by:  Danya Pagan MD        Dose:  1 drop   Place 1 drop Into the left eye 2 times daily   Quantity:  1 Bottle   Refills:  11       prednisoLONE acetate 1 % ophthalmic susp   Commonly known as:  PRED FORTE   Used for:  Cystoid macular edema of both eyes   Started by:  Danya Pagan MD        Dose:  1 drop   Place 1 drop Into the left eye 3 times daily   Quantity:  1 Bottle   Refills:  3            Where to get your medicines      These medications were sent to [place] [address] [place] [facility] [place], [place] - [address] [facility]  [address], [place] [address]     Phone:  [phone]     ketorolac tromethamine 0.4 % Soln ophthalmic solution    prednisoLONE acetate 1 % ophthalmic susp                Primary Care Provider Office Phone # Fax #    Shivani Clemons [phone] [address] [facility] [address]        Equal Access to Services     [facility] AH: Hadii deshawn ku hadasho Soomaali, waaxda luqadaha, qaybta kaalmada adeegyada, waxaida huynh haymartha ramos . So [facility] [phone].    ATENCIÓN: Si habla español, tiene a murphy disposición servicios gratuitos de asistencia lingüística. Llame al [phone].    We comply with applicable federal civil rights laws and [place] laws. We do not discriminate on the basis of race, color, national origin, age, disability, sex, sexual orientation, or gender identity.            Thank you!     Thank you for choosing EYE CLINIC  for your care. Our goal is always to provide you with excellent care. Hearing back from our patients is one way we can continue to improve our services. Please take a few minutes to complete the written survey that you may receive in the mail after your visit with us. Thank you!             Your Updated Medication List - Protect  others around you: Learn how to safely use, store and throw away your medicines at www.disposemymeds.org.          This list is accurate as of 5/30/18  3:04 PM.  Always use your most recent med list.                   Brand Name Dispense Instructions for use Diagnosis    ACE/ARB/ARNI NOT PRESCRIBED (INTENTIONAL)      Please choose reason not prescribed, below    Type 2 diabetes mellitus with hyperglycemia, without long-term current use of insulin (H)       Blood Glucose Monitoring Suppl Gauri     1 each    1 Device as needed One Touch Verio Flex glucose monitor    Diabetes mellitus (H)       blood glucose monitoring test strip    no brand specified    100 strip    Use to test blood sugars twice daily  times daily or as directed    Type 2 diabetes mellitus with hyperglycemia, without long-term current use of insulin (H)       cholecalciferol 1000 UNIT tablet    vitamin D3    90 tablet    Take one tablet daily, starting after you have finished the once a week dosing of the 50,000 Units dose.    MS (multiple sclerosis) (H)       ibuprofen 200 MG tablet    ADVIL/MOTRIN     Take 2-3 tablets by mouth as needed        Interferon Beta-1a 44 MCG/0.5ML Soaj     12 Syringe    Inject 44 mcg Subcutaneous three times a week    Multiple sclerosis (H)       ketorolac tromethamine 0.4 % Soln ophthalmic solution    ACULAR-LS    1 Bottle    Place 1 drop Into the left eye 2 times daily    Cystoid macular edema of both eyes       metFORMIN 500 MG 24 hr tablet    GLUCOPHAGE-XR    120 tablet    Take 2 tablets (1,000 mg) by mouth 2 times daily (with meals)    Type 2 diabetes mellitus with hyperglycemia, without long-term current use of insulin (H)       norgestrel-ethinyl estradiol 0.3-30 MG-MCG per tablet    LO/OVRAL    90 tablet    Take 1 tablet by mouth daily    Family planning, BCP (birth control pills) maintenance       order for DME     1 Device    Equipment being ordered: nebulizer with tubing    Wheezing       prednisoLONE acetate 1  % ophthalmic susp    PRED FORTE    1 Bottle    Place 1 drop Into the left eye 3 times daily    Cystoid macular edema of both eyes       zolpidem 5 MG tablet    AMBIEN    30 tablet    Take 1 tablet (5 mg) by mouth nightly as needed for sleep    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

## 2018-05-30 NOTE — NURSING NOTE
No chief complaint on file.    HPI    Affected eye(s):  Both   Symptoms:     Floaters   No flashes            Comments:  Pt here for 6 month follow up. Notes that she forgot her glasses at home, would like a copy of rx from last visit. Still seeing some floaters, nothing new to report with them. Denies any flashing lights. Newly diagnosed with Type 2 Diabetes   BS=  135  This morning    A1C=    6.5   PCP=  Dr. Kaci YANG  [facility]. YESENIA DUONG, COA 1:27 PM 05/30/2018

## 2018-05-31 ENCOUNTER — OFFICE VISIT (OUTPATIENT)
Dept: PODIATRY | Facility: CLINIC | Age: 38
End: 2018-05-31
Payer: COMMERCIAL

## 2018-05-31 VITALS
HEIGHT: 62 IN | SYSTOLIC BLOOD PRESSURE: 122 MMHG | WEIGHT: 229 LBS | DIASTOLIC BLOOD PRESSURE: 62 MMHG | BODY MASS INDEX: 42.14 KG/M2

## 2018-05-31 DIAGNOSIS — M76.71 PERONEAL TENDONITIS OF RIGHT LOWER LEG: Primary | ICD-10-CM

## 2018-05-31 PROCEDURE — 99213 OFFICE O/P EST LOW 20 MIN: CPT | Performed by: PODIATRIST

## 2018-05-31 NOTE — PATIENT INSTRUCTIONS
Thank you for choosing [facility]!    DR. WEBER'S CLINIC LOCATIONS:   MONDAY - [place] TUESDAY - [place]   [address] [facility]  [address]   [place] [address] [place] [address]   [phone]       THURSDAY AM - [place] THURSDAY PM - [place]   [address]   [place] [address] [place], [place] [address] [phone]       FRIDAY AM - [place] SET UP SURGERY: [phone] [address] APPOINTMENTS: [phone]   [place] [address] BILLING QUESTIONS: [phone] FAX NUMBER: [phone]     Follow Up: 3 weeks    [employer] / CAM WALKING BOOT INSTRUCTIONS  - Do NOT drive with CAM walker on. This is due to safety and legal issues.   - Remove the CAM walker several times a day and do ankle range of motion (ROM) exercises/wiggle toes.  - It is recommended that a thick-soled shoe be worn on the other foot to offset any created leg length issue.   - The boot does not have to be worn at night.   - There is an increased risk of developing a blood clot with lower extremity immobilization. ROM exercises and knee-high compression (tenso /ACE wrap) is recommended to lower that risk.   - You should seek medical attention if you experience calf swelling and/or pain, chest pain, or shortness of breath.       PRICE THERAPY  Many aches and pains throughout the foot and ankle can be helped with many simple treatments. This is usually described as PRICE Therapy.      P - Protection - often times, inflammation/pain in the lower extremity is not able to improve simply because the areas involved are never allowed to rest. Every step we take can bother the problematic area. Protecting those areas is an important step in the healing process. This may involve a walking cast boot, a special insert/orthotic device, an ankle brace, or simply avoiding barefoot walking.    R - Rest - in addition to protecting  the foot/ankle, resting is an important, but often times difficult, treatment option. Getting off your feet when they bother you, and specifically avoiding activities that cause pain/discomfort, are very beneficial to prevent, and treat, foot/ankle pain.      I - Ice - icing regularly can help to decrease inflammation and swelling in the foot, thus decreasing pain. Using an ice pack or a bag of frozen veggies works very well. Ice for 20 minutes multiple times per day as needed.  Do not place the ice directly on the skin as this can cause tissue damage.    C - Compression - using a compression wrap or an ACE wrap can help to decrease swelling, which can help to decrease pain. Wearing the wraps is generally not needed at night, but they should be worn on a regular basis when you are going to be on your feet for prolonged periods as gravity tends to pull fluids down to your feet/ankles.    E - Elevation - elevating your lower extremities multiple times daily for 15-20 minutes can help to decrease swelling, which works well in decreasing pain levels.    NSAID/Tylenol - Anti-inflammatories like Aleve or ibuprofen, and/or a pain medication, such as Tylenol, can help to improve pain levels and get the issue resolved sooner rather than later. Anyone with liver issues should be careful with Tylenol, and anyone with high blood pressure or heart, stomach or kidney issues should be careful with anti-inflammatories. Please ask if you have questions about these medications, including dosage.      Body Mass Index (BMI)  Many things can cause foot and ankle problems. Foot structure, activity level, foot mechanics and injuries are common causes of pain. One very important issue that often goes unmentioned, is body weight. Extra weight can cause increased stress on muscles, ligaments, bones and tendons. Sometimes just a few extra pounds is all it takes to put one over her/his threshold. Without reducing that stress, it can be  difficult to alleviate pain. Some people are uncomfortable addressing this issue, but we feel it is important for you to think about it. As Foot &  Ankle specialists, our job is addressing the lower extremity problem and possible causes. Regarding extra body weight, we encourage patients to discuss diet and weight management plans with their primary care doctors. It is this team approach that gives you the best opportunity for pain relief and getting you back on your feet.

## 2018-05-31 NOTE — MR AVS SNAPSHOT
After Visit Summary   5/31/2018    Alejandra Forbes    MRN: [id number]           Patient Information     Date Of Birth          1980        Visit Information        Provider Department      5/31/2018 1:30 PM Lane Serrato DPM [facility]        Today's Diagnoses     Peroneal tendonitis of right lower leg    -  1      Care Instructions    Thank you for choosing [facility]!    DR. SERRATO'S CLINIC LOCATIONS:   MONDAY - [place] TUESDAY - [place]   [address] [facility]  [address]   [place] [address] [place] [address]   [phone]       THURSDAY AM - [place] THURSDAY PM - [facility]   [address]   [place] [address] [place], [place] [address] [phone]       FRIDAY AM - [place] SET UP SURGERY: [phone] [address] APPOINTMENTS: [phone]   [place] [address] BILLING QUESTIONS: [phone] FAX NUMBER: [phone]     Follow Up: 3 weeks    AIRCAST / CAM WALKING BOOT INSTRUCTIONS  - Do NOT drive with CAM walker on. This is due to safety and legal issues.   - Remove the CAM walker several times a day and do ankle range of motion (ROM) exercises/wiggle toes.  - It is recommended that a thick-soled shoe be worn on the other foot to offset any created leg length issue.   - The boot does not have to be worn at night.   - There is an increased risk of developing a blood clot with lower extremity immobilization. ROM exercises and knee-high compression (tenso /ACE wrap) is recommended to lower that risk.   - You should seek medical attention if you experience calf swelling and/or pain, chest pain, or shortness of breath.       PRICE THERAPY  Many aches and pains throughout the foot and ankle can be helped with many simple treatments. This is usually described as PRICE Therapy.      P - Protection - often times, inflammation/pain in the lower  extremity is not able to improve simply because the areas involved are never allowed to rest. Every step we take can bother the problematic area. Protecting those areas is an important step in the healing process. This may involve a walking cast boot, a special insert/orthotic device, an ankle brace, or simply avoiding barefoot walking.    R - Rest - in addition to protecting the foot/ankle, resting is an important, but often times difficult, treatment option. Getting off your feet when they bother you, and specifically avoiding activities that cause pain/discomfort, are very beneficial to prevent, and treat, foot/ankle pain.      I - Ice - icing regularly can help to decrease inflammation and swelling in the foot, thus decreasing pain. Using an ice pack or a bag of frozen veggies works very well. Ice for 20 minutes multiple times per day as needed.  Do not place the ice directly on the skin as this can cause tissue damage.    C - Compression - using a compression wrap or an ACE wrap can help to decrease swelling, which can help to decrease pain. Wearing the wraps is generally not needed at night, but they should be worn on a regular basis when you are going to be on your feet for prolonged periods as gravity tends to pull fluids down to your feet/ankles.    E - Elevation - elevating your lower extremities multiple times daily for 15-20 minutes can help to decrease swelling, which works well in decreasing pain levels.    NSAID/Tylenol - Anti-inflammatories like Aleve or ibuprofen, and/or a pain medication, such as Tylenol, can help to improve pain levels and get the issue resolved sooner rather than later. Anyone with liver issues should be careful with Tylenol, and anyone with high blood pressure or heart, stomach or kidney issues should be careful with anti-inflammatories. Please ask if you have questions about these medications, including dosage.      Body Mass Index (BMI)  Many things can cause foot and ankle  problems. Foot structure, activity level, foot mechanics and injuries are common causes of pain. One very important issue that often goes unmentioned, is body weight. Extra weight can cause increased stress on muscles, ligaments, bones and tendons. Sometimes just a few extra pounds is all it takes to put one over her/his threshold. Without reducing that stress, it can be difficult to alleviate pain. Some people are uncomfortable addressing this issue, but we feel it is important for you to think about it. As Foot &  Ankle specialists, our job is addressing the lower extremity problem and possible causes. Regarding extra body weight, we encourage patients to discuss diet and weight management plans with their primary care doctors. It is this team approach that gives you the best opportunity for pain relief and getting you back on your feet.            Follow-ups after your visit        Your next 10 appointments already scheduled     Jun 05, 2018  3:30 PM CDT   (Arrive by 3:15 PM)   Return Multiple Sclerosis with Fredo Sheffield MD   [facility] Multiple [facility]clerosis ([facility])    [address] [place] [address] [phone]            Jun 22, 2018  1:30 PM CDT   (Arrive by 1:15 PM)   Office Visit with Dinorah Carrillo RD   [facility] ([facility])    [address] [place] [id number] [phone]           Bring a current list of meds and any records pertaining to this visit. For Physicals, please bring immunization records and any forms needing to be filled out. Please arrive 10 minutes early to complete paperwork.            Jun 22, 2018  2:30 PM CDT   (Arrive by 2:15 PM)   Office Visit with Maya Booker RN   [facility] ([facility])    [address] [place] [address] [phone]           Bring a current list of meds and any records  "pertaining to this visit. For Physicals, please bring immunization records and any forms needing to be filled out. Please arrive 10 minutes early to complete paperwork.            2018  7:45 AM CDT   RETURN RETINA with Genaro De Jesus MD   [facility] ([facility])    [address]  9 [address] Clin 9a  [facility] [id number] [phone]              Who to contact     If you have questions or need follow up information about today's clinic visit or your schedule please contact [facility] directly at [phone].  Normal or non-critical lab and imaging results will be communicated to you by MyChart, letter or phone within 4 business days after the clinic has received the results. If you do not hear from us within 7 days, please contact the clinic through MyChart or phone. If you have a critical or abnormal lab result, we will notify you by phone as soon as possible.  Submit refill requests through Relayr or call your pharmacy and they will forward the refill request to us. Please allow 3 business days for your refill to be completed.          Additional Information About Your Visit        MyCharEmbedly Information     Relayr lets you send messages to your doctor, view your test results, renew your prescriptions, schedule appointments and more. To sign up, go to [email or web address] . Click on \"Log in\" on the left side of the screen, which will take you to the Welcome page. Then click on \"Sign up Now\" on the right side of the page.     You will be asked to enter the access code listed below, as well as some personal information. Please follow the directions to create your username and password.     Your access code is: [email or web address]  Expires: 2018  6:30 AM     Your access code will  in 90 days. If you need help or a new code, please call your [place] clinic or [phone].        Care EveryWhere ID     This is your Care EveryWhere " "ID. This could be used by other organizations to access your [facility] medical records  [id number]        Your Vitals Were     Height Last Period BMI (Body Mass Index)             5' 2\" (1.575 m) 05/06/2018 41.88 kg/m2          Blood Pressure from Last 3 Encounters:   05/31/18 122/62   05/09/18 126/88   01/29/18 132/80    Weight from Last 3 Encounters:   05/31/18 229 lb (103.9 kg)   05/09/18 235 lb 4.8 oz (106.7 kg)   01/29/18 234 lb 12.8 oz (106.5 kg)              Today, you had the following     No orders found for display         Today's Medication Changes          These changes are accurate as of 5/31/18  1:46 PM.  If you have any questions, ask your nurse or doctor.               Start taking these medicines.        Dose/Directions    order for DME   Used for:  Peroneal tendonitis of right lower leg   Started by:  Lane Serrato DPM        Equipment being ordered: tall 7 1/2 Aircast boot   Quantity:  1 Device   Refills:  0            Where to get your medicines      Some of these will need a paper prescription and others can be bought over the counter.  Ask your nurse if you have questions.     Bring a paper prescription for each of these medications     order for DME                Primary Care Provider Office Phone # Fax #    Shivani Nuha Clemons [phone] [address] [facility] [address]        Equal Access to Services     [facility] AH: Hadii deshawn lopezo Somark, waaxda luqadaha, qaybta kaalmada katy, nilo morejon. So [facility] [phone].    ATENCIÓN: Si habla español, tiene a murphy disposición servicios gratuitos de asistencia lingüística. Llame al [phone].    We comply with applicable federal civil rights laws and [place] laws. We do not discriminate on the basis of race, color, national origin, age, disability, sex, sexual orientation, or gender identity.            Thank you!     Thank you for choosing [facility]  for your care. Our goal is always to provide you with excellent care. Hearing back from our patients is one way we can continue to improve our services. Please take a few minutes to complete the written survey that you may receive in the mail after your visit with us. Thank you!             Your Updated Medication List - Protect others around you: Learn how to safely use, store and throw away your medicines at www.disposemymeds.org.          This list is accurate as of 5/31/18  1:46 PM.  Always use your most recent med list.                   Brand Name Dispense Instructions for use Diagnosis    ACE/ARB/ARNI NOT PRESCRIBED (INTENTIONAL)      Please choose reason not prescribed, below    Type 2 diabetes mellitus with hyperglycemia, without long-term current use of insulin (H)       Blood Glucose Monitoring Suppl Gauri     1 each    1 Device as needed One Touch Verio Flex glucose monitor    Diabetes mellitus (H)       blood glucose monitoring test strip    no brand specified    100 strip    Use to test blood sugars twice daily  times daily or as directed    Type 2 diabetes mellitus with hyperglycemia, without long-term current use of insulin (H)       cholecalciferol 1000 UNIT tablet    vitamin D3    90 tablet    Take one tablet daily, starting after you have finished the once a week dosing of the 50,000 Units dose.    MS (multiple sclerosis) (H)       ibuprofen 200 MG tablet    ADVIL/MOTRIN     Take 2-3 tablets by mouth as needed        Interferon Beta-1a 44 MCG/0.5ML Soaj     12 Syringe    Inject 44 mcg Subcutaneous three times a week    Multiple sclerosis (H)       ketorolac tromethamine 0.4 % Soln ophthalmic solution    ACULAR-LS    1 Bottle    Place 1 drop Into the left eye 2 times daily    Cystoid macular edema of both eyes       metFORMIN 500 MG 24 hr tablet    GLUCOPHAGE-XR    120 tablet    Take 2 tablets (1,000 mg) by mouth 2 times daily (with meals)    Type 2 diabetes mellitus with hyperglycemia,  without long-term current use of insulin (H)       norgestrel-ethinyl estradiol 0.3-30 MG-MCG per tablet    LO/OVRAL    90 tablet    Take 1 tablet by mouth daily    Family planning, BCP (birth control pills) maintenance       order for DME     1 Device    Equipment being ordered: nebulizer with tubing    Wheezing       order for DME     1 Device    Equipment being ordered: tall 7 1/2 Aircast boot    Peroneal tendonitis of right lower leg       prednisoLONE acetate 1 % ophthalmic susp    PRED FORTE    1 Bottle    Place 1 drop Into the left eye 3 times daily    Cystoid macular edema of both eyes       zolpidem 5 MG tablet    AMBIEN    30 tablet    Take 1 tablet (5 mg) by mouth nightly as needed for sleep    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

## 2018-05-31 NOTE — PROGRESS NOTES
"Foot & Ankle Surgery   May 31, 2018    S:  Pt is seen today for evaluation of R foot pain.  I saw her Aug 2017 for bilateral foot/ankle pain without specific cause found on exam.  Today she presents with worsening lateral R midfoot pain.  No injury.  Worse with increased activities.  Feels better when wearing a shot with a higher heel, worse with tennis shoes.  NSAIDs can be helpful.    Vitals:    05/31/18 1334   BP: 122/62   Weight: 229 lb (103.9 kg)   Height: 5' 2\" (1.575 m)   '      ROS - Pos for CC.  Patient denies current nausea, vomiting, chills, fevers, belly pain, calf pain, chest pain or SOB.  Complete remainder of ROS it otherwise neg.      PE:  Gen:   No apparent distress  Eye:    Visual scanning without deficit  Ear:    Response to auditory stimuli wnl  Lung:    Non-labored breathing on RA noted  Abd:    NTND per patient report  Lymph:    Neg for pitting/non-pitting edema BLE  Vasc:    Pulses palpable, CFT minimally delayed  Neuro:    Light touch sensation intact to all sensory nerve distributions without paresthesias  Derm:    Neg for nodules, lesions or ulcerations  MSK:    Right lower extremity - tender at P.brevis insertion into 5th met base.  No lateral ankle ligament, sinus tarsi or anterior calcaneus  Calf:    Neg for redness, swelling or tenderness    Assessment:  38 year old female with P.brevis insertion tendinopathy right lower extremity       Plan:  Discussed etiologies, anatomy and options  1.  Right lower extremity P.brevis insertion tendinopathy  -CAM with instructions  -RICE/NSAID prn  -discussed importance of activity modifications  -consider PT, imaging if no improvement is noted    Follow up:  3 weeks or sooner with acute issues      Body mass index is 41.88 kg/(m^2).  Weight management plan: Patient was referred to their PCP to discuss a diet and exercise plan.         Lane Serrato DPM FACFAS FACFAOM  Podiatric Foot & Ankle Surgeon  [facility]  [phone]    "

## 2018-06-05 ENCOUNTER — OFFICE VISIT (OUTPATIENT)
Dept: NEUROLOGY | Facility: CLINIC | Age: 38
End: 2018-06-05
Attending: PSYCHIATRY & NEUROLOGY
Payer: COMMERCIAL

## 2018-06-05 VITALS
BODY MASS INDEX: 42.15 KG/M2 | WEIGHT: 229.06 LBS | HEIGHT: 62 IN | DIASTOLIC BLOOD PRESSURE: 94 MMHG | HEART RATE: 72 BPM | SYSTOLIC BLOOD PRESSURE: 127 MMHG

## 2018-06-05 DIAGNOSIS — G37.9 DEMYELINATING DISEASE OF CENTRAL NERVOUS SYSTEM (H): Primary | ICD-10-CM

## 2018-06-05 PROCEDURE — G0463 HOSPITAL OUTPT CLINIC VISIT: HCPCS | Mod: ZF

## 2018-06-05 ASSESSMENT — PAIN SCALES - GENERAL: PAINLEVEL: MILD PAIN (2)

## 2018-06-05 NOTE — LETTER
6/5/2018      RE: Alejandra Forbes  [address] [facility]  [address]  [place] [address]       Service Date: 06/05/2018      REASON FOR VISIT:  Alejandra Forbes is a 38-year-old woman who I have seen once previously in 02/2016 for possible multiple sclerosis.  She returns for regular followup after an absence of over 2 years.      HISTORY OF PRESENT ILLNESS:  April tells me she stopped taking her Rebif about 5 months ago.  This was due to persistent flu-like side effects.  She apparently came to neurologic attention because of a history of pars planitis and screening brain MRIs showed the development of lesions consistent with demyelinating disease.  When she showed evidence of accumulating lesions, she was started on Copaxone and then switched to Rebif.  She has never had anything resembling an MS relapse.  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  She has not had any change in visual symptoms.  She started on steroid eye drops.  She also tells me she was recently diagnosed with diabetes.  She is wearing a walking boot on her right foot because of foot pain of uncertain etiology.  She is following with Podiatry for that.  She tells me she is quitting her job as a  which is both exciting and a bit nerve-wracking as well.  She has essentially no neurologic symptoms attributable to multiple sclerosis.  No diplopia, numbness or tingling, weakness or clumsiness, gait impairment or bladder or bowel dysfunction.      PHYSICAL EXAMINATION:   VITAL SIGNS:  Blood pressure 127/94.  Pulse 72.  Weight 229 pounds.     GENERAL:   She is alert and oriented.  Affect is bright and language functions are normal.     NEUROLOGIC:   Eye movements are full without diplopia or nystagmus.  Facial strength and sensation are normal.  Muscle bulk, tone, strength and dexterity are normal in the arms and legs.  Light touch is intact and symmetric in the hands and feet.   Coordination testing is normal to finger tapping, toe tapping and finger-nose-finger.  Deep tendon reflexes are normal and symmetric.  Her gait is narrow-based and stable with normal tandem walk and negative Romberg.      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        I spent 45 minutes with April today.  Greater than 50% of which was spent in counseling and coordination of care.  We primarily discussed the plan going forward with regard to possible multiple sclerosis.  In someone who has never had a clinical relapse, and does not have the primary progressive form of the disorder, she technically does not meet diagnostic criteria for MS, although that is likely the correct diagnosis.  It is not surprising that she was started on immunotherapy in the context of radiologically isolated syndrome with increasing lesions, but I do not think it is imperative that she be on treatment.  We discussed the alternative to being on treatment, which would be to continue to follow her with clinical and MRI surveillance.  Ultimately, that is what we opted to go forward with at this point.  The rationale is that if she does have MS is has behaved in such a benign manner to date that she may 1 of the 15% or so of people who have a benign form of the disorder that really does not require treatment.      PLAN:     1.  MRI of the brain.     2.  If stable, we will gradually increase the interval between surveillance scans, repeating the next one 1-year later.  If there are significant new lesions, then I will advise her to go back on immunotherapy and we will discuss the alternatives.  I think injectable treatment is not an option at this point.   3.  Followup to be determined reviewing her MRI.        D: 06/08/2018   T: 06/08/2018   MT: [email or web address]      Name:     GIRISH APRIL   MRN:      [id number]         Account:      [id number]   :      1980           Service Date: 2018      Document: [id number]        Fredo Sheffield MD

## 2018-06-05 NOTE — Clinical Note
6/5/2018       RE: Alejandra Forbes  [address]  [address]  [place] [address]     Dear Colleague,    Thank you for referring your patient, Alejandra Forbes, to the [facility] MULTIPLE SCLEROSIS at [facility]. Please see a copy of my visit note below.    No notes on file    Again, thank you for allowing me to participate in the care of your patient.      Sincerely,    Fredo Sheffield MD

## 2018-06-05 NOTE — MR AVS SNAPSHOT
After Visit Summary   6/5/2018 April FRANCIS Forbes    MRN: [id number]           Patient Information     Date Of Birth          1980        Visit Information        Provider Department      6/5/2018 3:30 PM Fredo Sheffield MD [facility] Multiple Sclerosis        Today's Diagnoses     Demyelinating disease of central nervous system (H)    -  1      Care Instructions    1.  MRI brain (at your convenience).  2.  We will arrange follow up after seeing the MRI          Follow-ups after your visit        Follow-up notes from your care team     Return if symptoms worsen or fail to improve.      Your next 10 appointments already scheduled     Jun 14, 2018  7:45 PM CDT   MR BRAIN W/O & W CONTRAST with [id number]   [facility] ([facility])    [address] [address] [phone]           Take your medicines as usual, unless your doctor tells you not to. Bring a list of your current medicines to your exam (including vitamins, minerals and over-the-counter drugs).  You may or may not receive intravenous (IV) contrast for this exam pending the discretion of the Radiologist.  You do not need to do anything special to prepare.  The MRI machine uses a strong magnet. Please wear clothes without metal (snaps, zippers). A sweatsuit works well, or we may give you a hospital gown.  Please remove any body piercings and hair extensions before you arrive. You will also remove watches, jewelry, hairpins, wallets, dentures, partial dental plates and hearing aids. You may wear contact lenses, and you may be able to wear your rings. We have a safe place to keep your personal items, but it is safer to leave them at home.  **IMPORTANT** THE INSTRUCTIONS BELOW ARE ONLY FOR THOSE PATIENTS WHO HAVE BEEN PRESCRIBED SEDATION OR GENERAL ANESTHESIA DURING THEIR MRI PROCEDURE:  IF YOUR DOCTOR PRESCRIBED ORAL SEDATION (take medicine to help you relax during  your exam):   You must get the medicine from your doctor (oral medication) before you arrive. Bring the medicine to the exam. Do not take it at home. You ll be told when to take it upon arriving for your exam.   Arrive one hour early. Bring someone who can take you home after the test. Your medicine will make you sleepy. After the exam, you may not drive, take a bus or take a taxi by yourself.  IF YOUR DOCTOR PRESCRIBED IV SEDATION:   Arrive one hour early. Bring someone who can take you home after the test. Your medicine will make you sleepy. After the exam, you may not drive, take a bus or take a taxi by yourself.   No eating 6 hours before your exam. You may have clear liquids up until 4 hours before your exam. (Clear liquids include water, clear tea, black coffee and fruit juice without pulp.)  IF YOUR DOCTOR PRESCRIBED ANESTHESIA (be asleep for your exam):   Arrive 1 1/2 hours early. Bring someone who can take you home after the test. You may not drive, take a bus or take a taxi by yourself.   No eating 8 hours before your exam. You may have clear liquids up until 4 hours before your exam. (Clear liquids include water, clear tea, black coffee and fruit juice without pulp.)   You will spend four to five hours in the recovery room.  Please call the Imaging Department at your exam site with any questions.            Jun 22, 2018  1:30 PM CDT   (Arrive by 1:15 PM)   Office Visit with Dinorah Carrillo RD   [facility] ([facility])    [address] [place] [address] [phone]           Bring a current list of meds and any records pertaining to this visit. For Physicals, please bring immunization records and any forms needing to be filled out. Please arrive 10 minutes early to complete paperwork.            Jun 22, 2018  2:30 PM CDT   (Arrive by 2:15 PM)   Office Visit with Maya Booker RN   [facility] ([facility])    [address]  3rd Floor  [facility] [id number] [phone]           Bring a current list of meds and any records pertaining to this visit. For Physicals, please bring immunization records and any forms needing to be filled out. Please arrive 10 minutes early to complete paperwork.            Jul 25, 2018  7:45 AM CDT   RETURN RETINA with Genaro De Jesus MD   [facility] ([facility])    [address]  [address]  [facility] [id number] [phone]              Future tests that were ordered for you today     Open Future Orders        Priority Expected Expires Ordered    MR Brain w/o & w Contrast Routine 6/5/2018 6/5/2019 6/5/2018            Who to contact     If you have questions or need follow up information about today's clinic visit or your schedule please contact [facility] directly at [phone].  Normal or non-critical lab and imaging results will be communicated to you by [employer], letter or phone within 4 business days after the clinic has received the results. If you do not hear from us within 7 days, please contact the clinic through [employer] or phone. If you have a critical or abnormal lab result, we will notify you by phone as soon as possible.  Submit refill requests through [email or web address] or call your pharmacy and they will forward the refill request to us. Please allow 3 business days for your refill to be completed.          Additional Information About Your Visit        [email or web address] Information     [email or web address] lets you send messages to your doctor, view your test results, renew your prescriptions, schedule appointments and more. To sign up, go to [email or web address] . Click on \"Log in\" on the left side of the screen, which will take you to the Welcome page. Then click on \"Sign up Now\" on the right side of the page.     You will be asked to enter the access code listed below, as well as some personal information. " "Please follow the directions to create your username and password.     Your access code is: [email or web address]  Expires: 2018  6:30 AM     Your access code will  in 90 days. If you need help or a new code, please call your [facility] clinic or [phone].        Care EveryWhere ID     This is your Care EveryWhere ID. This could be used by other organizations to access your [facility] medical records  [id number]        Your Vitals Were     Pulse Height Last Period BMI (Body Mass Index)          72 1.575 m (5' 2\") 2018 41.9 kg/m2         Blood Pressure from Last 3 Encounters:   18 (!) 127/94   18 122/62   18 126/88    Weight from Last 3 Encounters:   18 103.9 kg (229 lb 0.9 oz)   18 103.9 kg (229 lb)   18 106.7 kg (235 lb 4.8 oz)                 Today's Medication Changes          These changes are accurate as of 18  4:52 PM.  If you have any questions, ask your nurse or doctor.               Stop taking these medicines if you haven't already. Please contact your care team if you have questions.     Interferon Beta-1a 44 MCG/0.5ML Soaj   Stopped by:  Fredo Sheffield MD                    Primary Care Provider Office Phone # Fax #    Shivani Nuha Clemons [phone]       [facility] [address]        Equal Access to Services     [facility]: [place] verdugo Somark, waaxda luqadaha, qaybta kaalmada nilo burns. So [facility] [phone].    ATENCIÓN: Si habla español, tiene a murphy disposición servicios gratuitos de asistencia lingüística. Llame al [phone].    We comply with applicable federal civil rights laws and [place] laws. We do not discriminate on the basis of race, color, national origin, age, disability, sex, sexual orientation, or gender identity.            Thank you!     Thank you for choosing [facility]  for your care. Our goal is always to provide " you with excellent care. Hearing back from our patients is one way we can continue to improve our services. Please take a few minutes to complete the written survey that you may receive in the mail after your visit with us. Thank you!             Your Updated Medication List - Protect others around you: Learn how to safely use, store and throw away your medicines at www.disposemymeds.org.          This list is accurate as of 6/5/18  4:52 PM.  Always use your most recent med list.                   Brand Name Dispense Instructions for use Diagnosis    ACE/ARB/ARNI NOT PRESCRIBED (INTENTIONAL)      Please choose reason not prescribed, below    Type 2 diabetes mellitus with hyperglycemia, without long-term current use of insulin (H)       Blood Glucose Monitoring Suppl Gauri     1 each    1 Device as needed One Touch Verio Flex glucose monitor    Diabetes mellitus (H)       blood glucose monitoring test strip    no brand specified    100 strip    Use to test blood sugars twice daily  times daily or as directed    Type 2 diabetes mellitus with hyperglycemia, without long-term current use of insulin (H)       cholecalciferol 1000 UNIT tablet    vitamin D3    90 tablet    Take one tablet daily, starting after you have finished the once a week dosing of the 50,000 Units dose.    MS (multiple sclerosis) (H)       ibuprofen 200 MG tablet    ADVIL/MOTRIN     Take 2-3 tablets by mouth as needed        ketorolac tromethamine 0.4 % Soln ophthalmic solution    ACULAR-LS    1 Bottle    Place 1 drop Into the left eye 2 times daily    Cystoid macular edema of both eyes       metFORMIN 500 MG 24 hr tablet    GLUCOPHAGE-XR    120 tablet    Take 2 tablets (1,000 mg) by mouth 2 times daily (with meals)    Type 2 diabetes mellitus with hyperglycemia, without long-term current use of insulin (H)       norgestrel-ethinyl estradiol 0.3-30 MG-MCG per tablet    LO/OVRAL    90 tablet    Take 1 tablet by mouth daily    Family planning, BCP  (birth control pills) maintenance       order for DME     1 Device    Equipment being ordered: nebulizer with tubing    Wheezing       order for DME     1 Device    Equipment being ordered: tall 7 1/2 Aircast boot    Peroneal tendonitis of right lower leg       prednisoLONE acetate 1 % ophthalmic susp    PRED FORTE    1 Bottle    Place 1 drop Into the left eye 3 times daily    Cystoid macular edema of both eyes       zolpidem 5 MG tablet    AMBIEN    30 tablet    Take 1 tablet (5 mg) by mouth nightly as needed for sleep    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

## 2018-06-08 NOTE — PROGRESS NOTES
Service Date: 06/05/2018      REASON FOR VISIT:  Alejandra Forbes is a 38-year-old woman who I have seen once previously in 02/2016 for possible multiple sclerosis.  She returns for regular followup after an absence of over 2 years.      HISTORY OF PRESENT ILLNESS:  April tells me she stopped taking her Rebif about 5 months ago.  This was due to persistent flu-like side effects.  She apparently came to neurologic attention because of a history of pars planitis and screening brain MRIs showed the development of lesions consistent with demyelinating disease.  When she showed evidence of accumulating lesions, she was started on Copaxone and then switched to Rebif.  She has never had anything resembling an MS relapse.  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  She has not had any change in visual symptoms.  She started on steroid eye drops.  She also tells me she was recently diagnosed with diabetes.  She is wearing a walking boot on her right foot because of foot pain of uncertain etiology.  She is following with Podiatry for that.  She tells me she is quitting her job as a  which is both exciting and a bit nerve-wracking as well.  She has essentially no neurologic symptoms attributable to multiple sclerosis.  No diplopia, numbness or tingling, weakness or clumsiness, gait impairment or bladder or bowel dysfunction.      PHYSICAL EXAMINATION:   VITAL SIGNS:  Blood pressure 127/94.  Pulse 72.  Weight 229 pounds.     GENERAL:   She is alert and oriented.  Affect is bright and language functions are normal.     NEUROLOGIC:   Eye movements are full without diplopia or nystagmus.  Facial strength and sensation are normal.  Muscle bulk, tone, strength and dexterity are normal in the arms and legs.  Light touch is intact and symmetric in the hands and feet.  Coordination testing is normal to finger tapping, toe tapping and finger-nose-finger.  Deep  tendon reflexes are normal and symmetric.  Her gait is narrow-based and stable with normal tandem walk and negative Romberg.      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        I spent 45 minutes with April today.  Greater than 50% of which was spent in counseling and coordination of care.  We primarily discussed the plan going forward with regard to possible multiple sclerosis.  In someone who has never had a clinical relapse, and does not have the primary progressive form of the disorder, she technically does not meet diagnostic criteria for MS, although that is likely the correct diagnosis.  It is not surprising that she was started on immunotherapy in the context of radiologically isolated syndrome with increasing lesions, but I do not think it is imperative that she be on treatment.  We discussed the alternative to being on treatment, which would be to continue to follow her with clinical and MRI surveillance.  Ultimately, that is what we opted to go forward with at this point.  The rationale is that if she does have MS is has behaved in such a benign manner to date that she may 1 of the 15% or so of people who have a benign form of the disorder that really does not require treatment.      PLAN:     1.  MRI of the brain.     2.  If stable, we will gradually increase the interval between surveillance scans, repeating the next one 1-year later.  If there are significant new lesions, then I will advise her to go back on immunotherapy and we will discuss the alternatives.  I think injectable treatment is not an option at this point.   3.  Followup to be determined reviewing her MRI.      Fredo SHAW MD        Addendum:  MRI shows several new and enhancing lesions.  This established the diagnosis of RRMS, and confirms the need for  immunotherapy.  I will arrange for her to be seen soon, by me or Ms. Gillette, to discuss treatment options.  I would lean toward teriflunomide (if she is not going to get pregnant), or dimethyl fumarate (if she might).     D: 2018   T: 2018   MT: MARYANA      Name:     GIRISH APRIL   MRN:      [id number]        Account:      [id number]   :      1980           Service Date: 2018      Document: [id number]

## 2018-06-14 ENCOUNTER — RADIANT APPOINTMENT (OUTPATIENT)
Dept: MRI IMAGING | Facility: CLINIC | Age: 38
End: 2018-06-14
Attending: PSYCHIATRY & NEUROLOGY
Payer: COMMERCIAL

## 2018-06-14 DIAGNOSIS — G37.9 DEMYELINATING DISEASE OF CENTRAL NERVOUS SYSTEM (H): ICD-10-CM

## 2018-06-14 RX ORDER — GADOBUTROL 604.72 MG/ML
10 INJECTION INTRAVENOUS ONCE
Status: COMPLETED | OUTPATIENT
Start: 2018-06-14 | End: 2018-06-14

## 2018-06-14 RX ADMIN — GADOBUTROL 10 ML: 604.72 INJECTION INTRAVENOUS at 19:50

## 2018-06-15 NOTE — DISCHARGE INSTRUCTIONS
MRI Contrast Discharge Instructions    The IV contrast you received today will pass out of your body in your  urine. This will happen in the next 24 hours. You will not feel this process.  Your urine will not change color.    Drink at least 4 extra glasses of water or juice today (unless your doctor  has restricted your fluids). This reduces the stress on your kidneys.  You may take your regular medicines.    If you are on dialysis: It is best to have dialysis today.    If you have a reaction: Most reactions happen right away. If you have  any new symptoms after leaving the hospital (such as hives or swelling),  call your hospital at the correct number below. Or call your family doctor.  If you have breathing distress or wheezing, call 911.    Special instructions: ***    I have read and understand the above information.    Signature:______________________________________ Date:___________    Staff:__________________________________________ Date:___________     Time:__________    [place] [facility] Departments:    ___[place]: [phone]  ___[facility]: [phone]  ___[place]: [phone] ___[facility]: [phone]  ___[facility]: [phone]  ___[facility]: [phone]  ___[phone]  ___[facility]: [phone]  ___[phone]

## 2018-06-18 ENCOUNTER — MYC MEDICAL ADVICE (OUTPATIENT)
Dept: NEUROLOGY | Facility: CLINIC | Age: 38
End: 2018-06-18

## 2018-06-18 NOTE — LETTER
June 19, 2018 April J Anrdei  [address]  [address]  [place] [address]    Hi April,    This letter is in regards to your MRI result. Your MRI shows several new and enhancing lesions. This confirms that you need to be on treatment. Dr. Sheffield would like you to come in to see our Nurse Practitioner, Marianna Gillette, to discuss this and start the process of getting you back on treatment. If you prefer, you could see Dr. Sheffield, but if he does not have an opening soon (within 2-3 weeks), he would like you to see our Nurse Practitioner, since she has more availability. Please call the clinic at [phone] to arrange the advised appointment.    Sincerely,    MD Jany Layne, MS RN Care Coordinator  Multiple Sclerosis Center  [facility]  [address] [address]  Phone [phone]  Fax [phone]

## 2018-06-18 NOTE — TELEPHONE ENCOUNTER
[email or web address] message sent to patient regarding her MRI results and Dr. Sheffield's recommendation that she come in to see Marianna to discuss restarting treatment; Will follow up with the patient tomorrow if she has not read or responded to the [email or web address] message by then.    Jany Israel, MS RN Care Coordinator

## 2018-06-19 ENCOUNTER — TELEPHONE (OUTPATIENT)
Dept: FAMILY MEDICINE | Facility: CLINIC | Age: 38
End: 2018-06-19

## 2018-06-19 DIAGNOSIS — E11.65 TYPE 2 DIABETES MELLITUS WITH HYPERGLYCEMIA, WITHOUT LONG-TERM CURRENT USE OF INSULIN (H): ICD-10-CM

## 2018-06-19 NOTE — TELEPHONE ENCOUNTER
April has not read her [employer] message; I called her and left [facility] advising to read her [employer] message, or call the clinic back; I will put a letter in the mail to her home, regardless, with her MRI results and Dr. Sheffield's input.    Jany Israel, MS RN Care Coordinator

## 2018-06-19 NOTE — TELEPHONE ENCOUNTER
AS,   Pharmacy sent fax requesting new Rx for test strips and lancets  Pt told them she's testing BS 5x daily  Pended orders  Please authorize if appropriate.  Thanks,  Cherelle ADAN RN

## 2018-06-21 ENCOUNTER — OFFICE VISIT (OUTPATIENT)
Dept: NEUROLOGY | Facility: CLINIC | Age: 38
End: 2018-06-21
Attending: NURSE PRACTITIONER
Payer: COMMERCIAL

## 2018-06-21 VITALS
BODY MASS INDEX: 42.15 KG/M2 | HEIGHT: 62 IN | HEART RATE: 76 BPM | WEIGHT: 229.06 LBS | DIASTOLIC BLOOD PRESSURE: 91 MMHG | SYSTOLIC BLOOD PRESSURE: 132 MMHG

## 2018-06-21 DIAGNOSIS — G35 MULTIPLE SCLEROSIS (H): Primary | ICD-10-CM

## 2018-06-21 PROCEDURE — G0463 HOSPITAL OUTPT CLINIC VISIT: HCPCS | Mod: ZF

## 2018-06-21 ASSESSMENT — PAIN SCALES - GENERAL: PAINLEVEL: NO PAIN (0)

## 2018-06-21 NOTE — TELEPHONE ENCOUNTER
April has made an appointment for today with Marianna Gillette.    Jany Israel, MS RN Care Coordinator

## 2018-06-21 NOTE — PATIENT INSTRUCTIONS
1.  I will contact you when I hear back from Dr. Sheffield about medication recommendation.     2. If you are starting on Aubagio or tecfidera, please follow up with me in 4-6 weeks after starting the new medication.     3. Please contact us with questions or concerns.

## 2018-06-21 NOTE — LETTER
6/21/2018       RE: Alejandra Forbes  [address] [facility] [address]  [place] [address]     Dear Colleague,    Thank you for referring your patient, Alejandra Forbes, to the [facility] MULTIPLE SCLEROSIS at [facility]. Please see a copy of my visit note below.      [facility] OUTPATIENT MULTIPLE SCLEROSIS CLINIC VISIT NOTE        REASON FOR VISIT:  April is a 38-year-old right-handed female who returns today to discuss treatment options for her new diagnosis of relapsing remitting multiple sclerosis.  She was most recently seen by Dr. Sheffield in our clinic on 06/05/2018.      HISTORY OF PRESENT ILLNESS:  Previously followed up with Dr. Ingram and Dr. Sheffield.  Per prior clinic notes, patient has a long history of pars planitis.  She was followed up with screening MRIs for MS because of its association with that disorder.  Initial MRI was stable but followup MRI in 04/2013 showed a symptomatic enhancing lesion.  She was referred to Neurology.  She had a spinal tap with findings consistent with a demyelinating disease process.  She was managed with MRI surveillance. In 2014, her follow up MRI showed accumulation of lesions and she was started on glatiramer acetate.  In spring 2015, she was switched to Rebif for possible benefit of interferon beta for par planitis.  She lost to followup between 02/2016 until 06/2018.  When she saw Dr. Sheffield in our clinic on June 5th, she reported that she stopped Rebif  In 01/2018 due to persistent flu-like side effects.  She had a followup MRI on 06/14/2018 showing several new and enhancing lesions.      INTERVAL HISTORY SINCE LAST VISIT: April was seen by Dr. Sheffield in our clinic on 06/05/2018.  Then on 06/14 she had a MRI brain showing new and enhancing lesions.  By this new MRI, patient establishes a diagnosis of relapsing-remitting MS. Treatment change was recommended by Dr. Sheffield for ongoing disease activity. She is here to  discuss treatment options.      Overall, she has been doing well since her last clinic visit.  No recent hospitalization or illness.  She denies any new changes in vision, balance, strength or sensation suggestive of a new relapse of multiple sclerosis since she was last seen here. Today she tells me that she has been going through a lot of work related stress since January,2018 and that was the main reason she stopped Rebif in January rather than flu like side effects. She is working as a  and is planning to quit her job due to ongoing stress associated with it. She has one more week of work left.      She has been following up with Ophthalmology regularly. She was diagnosed with intermediate uveitis left eye and currently uses steroid eyedrops.  She has a walking boot on her right foot because of foot pain of uncertain etiology.  She is following up with Podiatry for that. She has essentially no neurologic symptoms from her multiple sclerosis.  No double vision, numbness or tingling, weakness or clumsiness, gait impairment, bladder or bowel dysfunction.      PAST MEDICAL/SURGICAL HISTORY:   1. Relapsing remitting multiple sclerosis.   2. Type 2 diabetes.     3. Pars planitis.     4. Glaucoma, bilateral.     5. Left cataract removal.  6. Bipolar disorder. Previously treated with Lithium.       FAMILY HISTORY:  Negative for multiple sclerosis.      SOCIAL HISTORY:  She has been working as a  but will be quitting her job by end of next week.  She is hoping to find a new job in the near future.  She is , no children and she is not planning future pregnancy.     6/14/2018 MRI Brain:  Impression: 5-10 cerebral white matter lesions consistent with  clinically suspected demyelinating disease. There has been disease  progression since 8/10/2016. Enhancement of a few lesions consistent  with active demyelination.       PHYSICAL EXAMINATION:   VITAL SIGNS: BP (!) 132/91  Pulse 76  Ht  "1.575 m (5' 2\")  Wt 103.9 kg (229 lb 0.9 oz)  BMI 41.9 kg/m2   MENTAL STATUS: Alert,awake and oriented times four.   I did not perform a clinical exam today. She was seen by Dr. Sheffield recently on 6/5/2018 and her clinical exam was stable. Entire time of today's visit was utilized for medication discussion.     ASSESSMENT/PLAN:   1.  Relapsing remitting multiple sclerosis: With new and enhancing lesions on her most recent MRI from 6/14, patient establishes a diagnosis of relapsing-remitting MS.  She was on Rebif since 2015 till 1/2018. She stopped taking Rebif in 01/2018.     Due to her ongoing disease activity, Dr. Sheffield had recommended Aubagio or Tecfidera as other treatment options.Today patient tells me that she prefers to go back on Rebif.  When she stopped Rebif in January that was more because of her work-related stress rather than flu-like side effects. She denies injection fatigue. Her MRI in 8/2016 was stable and she was on Rebif at that time.      I discussed this with Dr. Sheffield and he is okay with restarting her on Rebif.  We will repeat brain MRI after 6 months of being on Rebif. If that remains stable, she can continue Rebif. If the imaging indeed shows ongoing disease activity, we would strongly recommend a treatment change at that time.  Either Aubagio or Tecfidera can be considered.  I also gave her information packets on both Aubagio and Tecfidera.      My chart message sent to the patient with this treatment plan. Patient was instructed to call the clinic to schedule f/u imaging. She will f/u with Dr. Sheffield after imaging.      2.  Contraception.  She currently takes oral contraceptive pills and she is not planning any future pregnancies.       3.  Vitamin D level.  The patient is taking 10,000 international units daily after found to have a low vitamin D level of 22 in 01/2018.  We will continue her on this dose and we will recheck the level in a year.     4.  Please call us with " questions or concerns.         JERMAIN IWLDE, CNP             D: 2018   T: 2018   MT: aileen      Name:     GIRISH, APRIL   MRN:      [id number]        Account:      [id number]   :      1980           Service Date: 2018      Document: [id number]

## 2018-06-21 NOTE — MR AVS SNAPSHOT
After Visit Summary   6/21/2018 April FRANCIS Forbes    MRN: [id number]           Patient Information     Date Of Birth          1980        Visit Information        Provider Department      6/21/2018 1:30 PM Marianna Gillette APRN [facility] Multiple Sclerosis        Care Instructions    1.  I will contact you when I hear back from Dr. Sheffield about medication recommendation.     2. If you are starting on Aubagio or tecfidera, please follow up with me in 4-6 weeks after starting the new medication.     3. Please contact us with questions or concerns.           Follow-ups after your visit        Your next 10 appointments already scheduled     Jul 11, 2018 10:30 AM CDT   (Arrive by 10:15 AM)   Office Visit with Dinorah Carrillo RD   [facility] ([facility])    [address] [place] [address] [phone]           Bring a current list of meds and any records pertaining to this visit. For Physicals, please bring immunization records and any forms needing to be filled out. Please arrive 10 minutes early to complete paperwork.            Jul 11, 2018  2:30 PM CDT   (Arrive by 2:15 PM)   Office Visit with Maya Booker RN   [facility] ([facility])    [address] [place] [address] [phone]           Bring a current list of meds and any records pertaining to this visit. For Physicals, please bring immunization records and any forms needing to be filled out. Please arrive 10 minutes early to complete paperwork.            Jul 25, 2018  7:45 AM CDT   RETURN RETINA with Genaro De Jesus MD   [facility] Clinic ([facility])    [address]  9[address] Clin [address]  [facility] [id number] [phone]              Who to contact     If you have questions or need follow up information about today's clinic visit or your schedule please  "contact [facility] MULTIPLE SCLEROSIS directly at [phone].  Normal or non-critical lab and imaging results will be communicated to you by MyChart, letter or phone within 4 business days after the clinic has received the results. If you do not hear from us within 7 days, please contact the clinic through MyChart or phone. If you have a critical or abnormal lab result, we will notify you by phone as soon as possible.  Submit refill requests through [email or web address] or call your pharmacy and they will forward the refill request to us. Please allow 3 business days for your refill to be completed.          Additional Information About Your Visit        [employer] Information     [email or web address] gives you secure access to your electronic health record. If you see a primary care provider, you can also send messages to your care team and make appointments. If you have questions, please call your primary care clinic.  If you do not have a primary care provider, please call [phone] and they will assist you.        Care EveryWhere ID     This is your Care EveryWhere ID. This could be used by other organizations to access your [facility] medical records  [id number]        Your Vitals Were     Pulse Height BMI (Body Mass Index)             76 1.575 m (5' 2\") 41.9 kg/m2          Blood Pressure from Last 3 Encounters:   06/21/18 (!) 132/91   06/05/18 (!) 127/94   05/31/18 122/62    Weight from Last 3 Encounters:   06/21/18 103.9 kg (229 lb 0.9 oz)   06/05/18 103.9 kg (229 lb 0.9 oz)   05/31/18 103.9 kg (229 lb)              Today, you had the following     No orders found for display       Primary Care Provider Office Phone # Fax #    Shivani Clemons [phone] [address] [facility] [address]        Equal Access to Services     SIMI BLUM : Delilah Valerio, wakyle thomas, qaybta louis burns, nilo morejon. So [facility] [phone].    ATENCIÓN: Si habla " español, tiene a murphy disposición servicios gratuitos de asistencia lingüística. Homa wagoner [phone].    We comply with applicable federal civil rights laws and [place] laws. We do not discriminate on the basis of race, color, national origin, age, disability, sex, sexual orientation, or gender identity.            Thank you!     Thank you for choosing [facility] MULTIPLE SCLEROSIS  for your care. Our goal is always to provide you with excellent care. Hearing back from our patients is one way we can continue to improve our services. Please take a few minutes to complete the written survey that you may receive in the mail after your visit with us. Thank you!             Your Updated Medication List - Protect others around you: Learn how to safely use, store and throw away your medicines at www.disposemymeds.org.          This list is accurate as of 6/21/18  2:37 PM.  Always use your most recent med list.                   Brand Name Dispense Instructions for use Diagnosis    ACE/ARB/ARNI NOT PRESCRIBED (INTENTIONAL)      Please choose reason not prescribed, below    Type 2 diabetes mellitus with hyperglycemia, without long-term current use of insulin (H)       Blood Glucose Monitoring Suppl Gauri     1 each    1 Device as needed One Touch Verio Flex glucose monitor    Diabetes mellitus (H)       blood glucose monitoring test strip    no brand specified    100 strip    Use to test blood sugars twice daily  times daily or as directed    Type 2 diabetes mellitus with hyperglycemia, without long-term current use of insulin (H)       cholecalciferol 1000 UNIT tablet    vitamin D3    90 tablet    Take one tablet daily, starting after you have finished the once a week dosing of the 50,000 Units dose.    MS (multiple sclerosis) (H)       ibuprofen 200 MG tablet    ADVIL/MOTRIN     Take 2-3 tablets by mouth as needed        ketorolac tromethamine 0.4 % Soln ophthalmic solution    ACULAR-LS    1 Bottle    Place 1 drop Into the  left eye 2 times daily    Cystoid macular edema of both eyes       metFORMIN 500 MG 24 hr tablet    GLUCOPHAGE-XR    120 tablet    Take 2 tablets (1,000 mg) by mouth 2 times daily (with meals)    Type 2 diabetes mellitus with hyperglycemia, without long-term current use of insulin (H)       norgestrel-ethinyl estradiol 0.3-30 MG-MCG per tablet    LO/OVRAL    90 tablet    Take 1 tablet by mouth daily    Family planning, BCP (birth control pills) maintenance       order for DME     1 Device    Equipment being ordered: nebulizer with tubing    Wheezing       order for DME     1 Device    Equipment being ordered: tall 7 1/2 Aircast boot    Peroneal tendonitis of right lower leg       prednisoLONE acetate 1 % ophthalmic susp    PRED FORTE    1 Bottle    Place 1 drop Into the left eye 3 times daily    Cystoid macular edema of both eyes       zolpidem 5 MG tablet    AMBIEN    30 tablet    Take 1 tablet (5 mg) by mouth nightly as needed for sleep    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

## 2018-06-22 ENCOUNTER — MYC MEDICAL ADVICE (OUTPATIENT)
Dept: NEUROLOGY | Facility: CLINIC | Age: 38
End: 2018-06-22

## 2018-06-22 DIAGNOSIS — G35 MULTIPLE SCLEROSIS (H): Primary | ICD-10-CM

## 2018-06-22 NOTE — TELEPHONE ENCOUNTER
Please call patient  Refill is sent  I am not sure if insurance will cover 5 times a day  She be ok to check twice daily to three times daily max  Lab Results   Component Value Date    A1C 6.5 05/04/2018    A1C 7.1 01/15/2018    A1C 7.3 06/08/2015    A1C 5.6 03/31/2011    A1C 5.7 07/03/2008

## 2018-06-22 NOTE — PROGRESS NOTES
[facility] OUTPATIENT MULTIPLE SCLEROSIS CLINIC VISIT NOTE        REASON FOR VISIT:  April is a 38-year-old right-handed female who returns today to discuss treatment options for her new diagnosis of relapsing remitting multiple sclerosis.  She was most recently seen by Dr. Sheffield in our clinic on 06/05/2018.      HISTORY OF PRESENT ILLNESS:  Previously followed up with Dr. Ingram and Dr. Sheffield.  Per prior clinic notes, patient has a long history of pars planitis.  She was followed up with screening MRIs for MS because of its association with that disorder.  Initial MRI was stable but followup MRI in 04/2013 showed a symptomatic enhancing lesion.  She was referred to Neurology.  She had a spinal tap with findings consistent with a demyelinating disease process.  She was managed with MRI surveillance. In 2014, her follow up MRI showed accumulation of lesions and she was started on glatiramer acetate.  In spring 2015, she was switched to Rebif for possible benefit of interferon beta for par planitis.  She lost to followup between 02/2016 until 06/2018.  When she saw Dr. Sheffield in our clinic on June 5th, she reported that she stopped Rebif  In 01/2018 due to persistent flu-like side effects.  She had a followup MRI on 06/14/2018 showing several new and enhancing lesions.      INTERVAL HISTORY SINCE LAST VISIT: April was seen by Dr. Sheffield in our clinic on 06/05/2018.  Then on 06/14 she had a MRI brain showing new and enhancing lesions.  By this new MRI, patient establishes a diagnosis of relapsing-remitting MS. Treatment change was recommended by Dr. Sheffield for ongoing disease activity. She is here to discuss treatment options.      Overall, she has been doing well since her last clinic visit.  No recent hospitalization or illness.  She denies any new changes in vision, balance, strength or sensation suggestive of a new relapse of multiple sclerosis since she was last seen here. Today she  "tells me that she has been going through a lot of work related stress since January,2018 and that was the main reason she stopped Rebif in January rather than flu like side effects. She is working as a  and is planning to quit her job due to ongoing stress associated with it. She has one more week of work left.      She has been following up with Ophthalmology regularly. She was diagnosed with intermediate uveitis left eye and currently uses steroid eyedrops.  She has a walking boot on her right foot because of foot pain of uncertain etiology.  She is following up with Podiatry for that. She has essentially no neurologic symptoms from her multiple sclerosis.  No double vision, numbness or tingling, weakness or clumsiness, gait impairment, bladder or bowel dysfunction.      PAST MEDICAL/SURGICAL HISTORY:   1. Relapsing remitting multiple sclerosis.   2. Type 2 diabetes.     3. Pars planitis.     4. Glaucoma, bilateral.     5. Left cataract removal.  6. Bipolar disorder. Previously treated with Lithium.       FAMILY HISTORY:  Negative for multiple sclerosis.      SOCIAL HISTORY:  She has been working as a  but will be quitting her job by end of next week.  She is hoping to find a new job in the near future.  She is , no children and she is not planning future pregnancy.     6/14/2018 MRI Brain:  Impression: 5-10 cerebral white matter lesions consistent with  clinically suspected demyelinating disease. There has been disease  progression since 8/10/2016. Enhancement of a few lesions consistent  with active demyelination.       PHYSICAL EXAMINATION:   VITAL SIGNS: BP (!) 132/91  Pulse 76  Ht 1.575 m (5' 2\")  Wt 103.9 kg (229 lb 0.9 oz)  BMI 41.9 kg/m2   MENTAL STATUS: Alert,awake and oriented times four.   I did not perform a clinical exam today. She was seen by Dr. Sheffield recently on 6/5/2018 and her clinical exam was stable. Entire time of today's visit was utilized for " medication discussion.     ASSESSMENT/PLAN:   1.  Relapsing remitting multiple sclerosis: With new and enhancing lesions on her most recent MRI from , patient establishes a diagnosis of relapsing-remitting MS.  She was on Rebif since  till 2018. She stopped taking Rebif in 2018.     Due to her ongoing disease activity, Dr. Sheffield had recommended Aubagio or Tecfidera as other treatment options.Today patient tells me that she prefers to go back on Rebif.  When she stopped Rebif in January that was more because of her work-related stress rather than flu-like side effects. She denies injection fatigue. Her MRI in 2016 was stable and she was on Rebif at that time.      I discussed this with Dr. Sheffield and he is okay with restarting her on Rebif.  We will repeat brain MRI after 6 months of being on Rebif. If that remains stable, she can continue Rebif. If the imaging indeed shows ongoing disease activity, we would strongly recommend a treatment change at that time.  Either Aubagio or Tecfidera can be considered.  I also gave her information packets on both Aubagio and Tecfidera.      My chart message sent to the patient with this treatment plan. Patient was instructed to call the clinic to schedule f/u imaging. She will f/u with Dr. Sheffield after imaging.      2.  Contraception.  She currently takes oral contraceptive pills and she is not planning any future pregnancies.       3.  Vitamin D level.  The patient is taking 10,000 international units daily after found to have a low vitamin D level of 22 in 2018.  We will continue her on this dose and we will recheck the level in a year.     4.  Please call us with questions or concerns.         JERMAIN WILDE, CNP             D: 2018   T: 2018   MT: aileen      Name:     GIRISH APRIL   MRN:      [id number]        Account:      [id number]   :      1980           Service Date: 2018      Document: [id number]

## 2018-06-25 ENCOUNTER — TELEPHONE (OUTPATIENT)
Dept: NEUROLOGY | Facility: CLINIC | Age: 38
End: 2018-06-25

## 2018-06-25 NOTE — TELEPHONE ENCOUNTER
Prior Authorization Specialty Medication Request    Medication/Dose: Rebif 44mcg  ICD code (if different than what is on RX):  Relapsing Remitting Multiple Sclerosis, G35  Previously Tried and Failed:  Copaxone     Important Lab Values: n/a  Rationale: Re-initiation of disease modifying therapy for demyelinating disease, please approve.    Insurance Name: [employer]  Insurance ID: [id number]  Insurance Phone Number: [phone]    Pharmacy Information (if different than what is on RX)  Name:  n/a  Phone:  n/a

## 2018-06-25 NOTE — TELEPHONE ENCOUNTER
[employer] message sent to patient with Dr. Sheffield's input.    Jany Israel, MS RN Care Coordinator

## 2018-06-25 NOTE — TELEPHONE ENCOUNTER
PA Initiation    Medication: Rebif 44g  Insurance Company: [employer] - Phone [phone] Fax [phone]  Pharmacy Filling the Rx: [place], [place] - [address] [place] [address]  Filling Pharmacy Phone:    Filling Pharmacy Fax:    Start Date: 6/25/2018    Central Prior Authorization Team   Phone: [phone]      Awaiting additional questions via CMM

## 2018-06-25 NOTE — TELEPHONE ENCOUNTER
Dr. Sheffield, please see April's [employer] message (Marianna is out of the office today); The only issue I can think of is the side effects if she does not titrate again; What do you recommend for her? Thank you.    Jany Israel, MS RN Care Coordinator

## 2018-06-25 NOTE — TELEPHONE ENCOUNTER
She can go right back to the full dose if she wants, but as you note may have more severe flu-like side effects with the first few doses, compared to if she re-did the titration.  Either way is OK with me.  There's nothing dangerous about going back to the full dose immediately - would just make sure to be well-hydrated, and premedicate with ibuprofen (or naprosyn, or tylenol, or whatever she typically uses).

## 2018-06-26 NOTE — TELEPHONE ENCOUNTER
Prior Authorization Approval    Authorization Effective Date: 6/25/2018  Authorization Expiration Date: 6/25/2020  Medication: Rebif 44mcg  Approved Dose/Quantity: 12/28 days  Reference #: [id number]   Insurance Company: [employer] [phone] Fax [phone]  Which Pharmacy is filling the prescription (Not needed for infusion/clinic administered): New Script is needed

## 2018-07-03 DIAGNOSIS — R73.9 HYPERGLYCEMIA, DRUG-INDUCED: ICD-10-CM

## 2018-07-03 DIAGNOSIS — T50.905A HYPERGLYCEMIA, DRUG-INDUCED: ICD-10-CM

## 2018-07-03 RX ORDER — METFORMIN HCL 500 MG
TABLET, EXTENDED RELEASE 24 HR ORAL
Start: 2018-07-03

## 2018-07-03 NOTE — TELEPHONE ENCOUNTER
"Too soon.  Rx sent 5/9/18 for #120 with 3 refills.  Candace Wong RN  Requested Prescriptions   Refused Prescriptions Disp Refills     metFORMIN (GLUCOPHAGE-XR) 500 MG 24 hr tablet [Pharmacy Med Name: METFORMIN  MG TABLET]       Sig: TAKE 2 TABLETS (1,000 MG) BY MOUTH DAILY (WITH DINNER)    Biguanide Agents Failed    7/3/2018  1:22 AM       Failed - Blood pressure less than 140/90 in past 6 months    BP Readings from Last 3 Encounters:   06/21/18 (!) 132/91   06/05/18 (!) 127/94   05/31/18 122/62                Passed - Patient is age 10 or older       Passed - Recent (12 mo) or future (30 days) visit within the authorizing provider's specialty     Patient had office visit in the last 12 months or has a visit in the next 30 days with authorizing provider or within the authorizing provider's specialty.  See \"Patient Info\" tab in inbasket, or \"Choose Columns\" in Meds & Orders section of the refill encounter.           Passed - Patient does NOT have a diagnosis of CHF.       Passed - Patient is not pregnant       Passed - Patient has not had a positive pregnancy test within the past 12 mos.           "

## 2018-07-09 DIAGNOSIS — R73.9 HYPERGLYCEMIA, DRUG-INDUCED: ICD-10-CM

## 2018-07-09 DIAGNOSIS — T50.905A HYPERGLYCEMIA, DRUG-INDUCED: ICD-10-CM

## 2018-07-10 RX ORDER — METFORMIN HCL 500 MG
TABLET, EXTENDED RELEASE 24 HR ORAL
Start: 2018-07-10

## 2018-07-10 NOTE — TELEPHONE ENCOUNTER
"Requested Prescriptions   Pending Prescriptions Disp Refills     metFORMIN (GLUCOPHAGE-XR) 500 MG 24 hr tablet [Pharmacy Med Name: METFORMIN  MG TABLET] 60 tablet 0     Sig: TAKE 2 TABLETS (1,000 MG) BY MOUTH DAILY (WITH DINNER)    Biguanide Agents Failed    7/9/2018  1:09 PM       Failed - Blood pressure less than 140/90 in past 6 months    BP Readings from Last 3 Encounters:   06/21/18 (!) 132/91   06/05/18 (!) 127/94   05/31/18 122/62                Passed - Patient is age 10 or older       Passed - Recent (12 mo) or future (30 days) visit within the authorizing provider's specialty     Patient had office visit in the last 12 months or has a visit in the next 30 days with authorizing provider or within the authorizing provider's specialty.  See \"Patient Info\" tab in inbasket, or \"Choose Columns\" in Meds & Orders section of the refill encounter.           Passed - Patient does NOT have a diagnosis of CHF.       Passed - Patient is not pregnant       Passed - Patient has not had a positive pregnancy test within the past 12 mos.         "

## 2018-07-11 ENCOUNTER — OFFICE VISIT (OUTPATIENT)
Dept: EDUCATION SERVICES | Facility: CLINIC | Age: 38
End: 2018-07-11
Payer: COMMERCIAL

## 2018-07-11 VITALS — BODY MASS INDEX: 41.96 KG/M2 | WEIGHT: 229.4 LBS

## 2018-07-11 DIAGNOSIS — E11.9 DIABETES MELLITUS, TYPE 2 (H): Primary | ICD-10-CM

## 2018-07-11 DIAGNOSIS — E11.9 DIABETES MELLITUS WITHOUT COMPLICATION (H): Primary | ICD-10-CM

## 2018-07-11 NOTE — MR AVS SNAPSHOT
After Visit Summary   7/11/2018    Alejandra Forbes    MRN: [id number]           Patient Information     Date Of Birth          1980        Visit Information        Provider Department      7/11/2018 2:30 PM [facility]        Care Instructions        Maya Booker RN,CDE  [facility]  [address] [place] [address]  Phone: [phone]  [email or web address]              Follow-ups after your visit        Your next 10 appointments already scheduled     Jul 25, 2018  7:45 AM CDT   RETURN RETINA with Genaro De Jesus MD   Eye Clinic ([facility])    [address]  [address] [address] [phone]            Aug 20, 2018 12:30 PM CDT   (Arrive by 12:15 PM)   [facility] ([facility])    [address] [place] [address] [phone]           Bring a current list of meds and any records pertaining to this visit. For Physicals, please bring immunization records and any forms needing to be filled out. Please arrive 10 minutes early to complete paperwork.            Aug 20, 2018  1:30 PM CDT   (Arrive by 1:15 PM)   Office Visit with Dinorah Carrillo RD   [facility] ([facility])    [address] [place] [address] [phone]           Bring a current list of meds and any records pertaining to this visit. For Physicals, please bring immunization records and any forms needing to be filled out. Please arrive 10 minutes early to complete paperwork.              Who to contact     Please call your clinic at [phone] to:    Ask questions about your health    Make or cancel appointments    Discuss your medicines    Learn about your test results    Speak to your doctor            Additional Information About Your Visit        Cristine  Information     [employer] gives you secure access to your electronic health record. If you see a primary care provider, you can also send messages to your care team and make appointments. If you have questions, please call your primary care clinic.  If you do not have a primary care provider, please call [phone] and they will assist you.      [employer] is an electronic gateway that provides easy, online access to your medical records. With [employer], you can request a clinic appointment, read your test results, renew a prescription or communicate with your care team.     To access your existing account, please contact your [facility] or call [phone] for assistance.        Care EveryWhere ID     This is your Care EveryWhere ID. This could be used by other organizations to access your [place] medical records  [id number]         Blood Pressure from Last 3 Encounters:   06/21/18 (!) 132/91   06/05/18 (!) 127/94   05/31/18 122/62    Weight from Last 3 Encounters:   07/11/18 104.1 kg (229 lb 6.4 oz)   06/21/18 103.9 kg (229 lb 0.9 oz)   06/05/18 103.9 kg (229 lb 0.9 oz)              Today, you had the following     No orders found for display       Primary Care Provider Office Phone # Fax #    Shivani Nuha Clemons [phone] [address] [facility] [address]        Equal Access to Services     [facility] : Hadii aad ku hadasho Soomaali, waaxda luqadaha, qaybta kaalmada adeegyada, nilo ramos . So [facility] [phone].    ATENCIÓN: Si habla español, tiene a murphy disposición servicios gratuitos de asistencia lingüística. Llame al [phone].    We comply with applicable federal civil rights laws and [place] laws. We do not discriminate on the basis of race, color, national origin, age, disability, sex, sexual orientation, or gender identity.            Thank you!     Thank you for choosing [facility]  for your care. Our  goal is always to provide you with excellent care. Hearing back from our patients is one way we can continue to improve our services. Please take a few minutes to complete the written survey that you may receive in the mail after your visit with us. Thank you!             Your Updated Medication List - Protect others around you: Learn how to safely use, store and throw away your medicines at www.disposemymeds.org.          This list is accurate as of 7/11/18  3:09 PM.  Always use your most recent med list.                   Brand Name Dispense Instructions for use Diagnosis    ACE/ARB/ARNI NOT PRESCRIBED (INTENTIONAL)      Please choose reason not prescribed, below    Type 2 diabetes mellitus with hyperglycemia, without long-term current use of insulin (H)       blood glucose monitoring lancets     500 each    Use to test blood sugars five times daily or as directed.    Type 2 diabetes mellitus with hyperglycemia, without long-term current use of insulin (H)       Blood Glucose Monitoring Suppl Gauri     1 each    1 Device as needed One Touch Verio Flex glucose monitor    Diabetes mellitus (H)       blood glucose monitoring test strip    no brand specified    500 strip    Use to test blood sugars five times daily or as directed    Type 2 diabetes mellitus with hyperglycemia, without long-term current use of insulin (H)       cholecalciferol 1000 UNIT tablet    vitamin D3    90 tablet    Take one tablet daily, starting after you have finished the once a week dosing of the 50,000 Units dose.    MS (multiple sclerosis) (H)       ibuprofen 200 MG tablet    ADVIL/MOTRIN     Take 2-3 tablets by mouth as needed        ketorolac tromethamine 0.4 % Soln ophthalmic solution    ACULAR-LS    1 Bottle    Place 1 drop Into the left eye 2 times daily    Cystoid macular edema of both eyes       metFORMIN 500 MG 24 hr tablet    GLUCOPHAGE-XR    120 tablet    Take 2 tablets (1,000 mg) by mouth 2 times daily (with meals)    Type 2  diabetes mellitus with hyperglycemia, without long-term current use of insulin (H)       norgestrel-ethinyl estradiol 0.3-30 MG-MCG per tablet    LO/OVRAL    90 tablet    Take 1 tablet by mouth daily    Family planning, BCP (birth control pills) maintenance       order for DME     1 Device    Equipment being ordered: nebulizer with tubing    Wheezing       order for DME     1 Device    Equipment being ordered: tall 7 1/2 Aircast boot    Peroneal tendonitis of right lower leg       prednisoLONE acetate 1 % ophthalmic susp    PRED FORTE    1 Bottle    Place 1 drop Into the left eye 3 times daily    Cystoid macular edema of both eyes       zolpidem 5 MG tablet    AMBIEN    30 tablet    Take 1 tablet (5 mg) by mouth nightly as needed for sleep    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

## 2018-07-11 NOTE — PATIENT INSTRUCTIONS
Maya Booker RN,CDE  [id number] [place] [address]  Phone: [phone]  [email or web address]

## 2018-07-11 NOTE — PATIENT INSTRUCTIONS
1. We reviewed healthy diet today, low saturated low trans fat low sodium diet and carbohydrate counting.  2. To lose weight, approximately 2-4 pounds per month, 5902-3260 calories is sufficient. You can try an eunice/program called [employer] to help   3. Great job with exercise, continue aerobic activity/swimming at least 5 times/week for at least 30 minutes  4. Blood sugar goals: fastin-130 and before meals: , 2 hours after meals: less than 180  5. Follow up one month:  at 12:30 with Maya Booker and 1:30 with cassi Carrillo RD, LD, CDE  Diabetes Care  [address]  [address]  [address]  Phone: [phone]  Appointment line: [phone]  Email: [email or web address]

## 2018-07-11 NOTE — PROGRESS NOTES
"  Diabetes Self Management Training: Individual Review Visit    Alejandra Forbes presents today for education and evaluation of glucose control related to Type 2 diabetes.    She is accompanied by self    Patient Problem List and Family Medical History reviewed for relevant medical history, current medical status, and diabetes risk factors.    Current Diabetes Management per Patient:  Taking diabetes medications?   yes:     Diabetes Medication(s)     Biguanides Sig    metFORMIN (GLUCOPHAGE-XR) 500 MG 24 hr tablet Take 2 tablets (1,000 mg) by mouth 2 times daily (with meals)          Past Diabetes Education: Newly diagnosed    Patient glucose self monitoring as follows: multiple times daily.   BG results: fasting glucose- 161/151/140, 148/131, 136, 166, pre-lunch glucose- 152/139, pre-supper glucose- 115/168/128, bedtime- 140, 155/147     Do you have any difficulty affording your medications or glucose monitoring supplies?  No           Vitals:  Wt 104.1 kg (229 lb 6.4 oz)  BMI 41.96 kg/m2  Estimated body mass index is 41.96 kg/(m^2) as calculated from the following:    Height as of 6/21/18: 1.575 m (5' 2\").    Weight as of this encounter: 104.1 kg (229 lb 6.4 oz).   Last 3 BP:   BP Readings from Last 3 Encounters:   06/21/18 (!) 132/91   06/05/18 (!) 127/94   05/31/18 122/62     History   Smoking Status     Never Smoker   Smokeless Tobacco     Never Used       Labs:  Lab Results   Component Value Date    A1C 6.5 05/04/2018     Lab Results   Component Value Date     05/09/2018     Lab Results   Component Value Date    LDL 96 05/09/2018     HDL Cholesterol   Date Value Ref Range Status   05/09/2018 39 (L) >49 mg/dL Final   ]  GFR Estimate   Date Value Ref Range Status   01/15/2018 >90 >60 mL/min/1.7m2 Final     Comment:     Non  GFR Calc     GFR Estimate If Black   Date Value Ref Range Status   01/15/2018 >90 >60 mL/min/1.7m2 Final     Comment:      GFR Calc     Lab Results "   Component Value Date    CR 0.53 01/15/2018     No results found for: MICROALBUMIN    Nutrition Review:  April is here for new dx type 2 diabetes. I have read her PMH. She has a h/o elevated HgbA1c in the past 2 years when she was taking high dose steroids for her MS. She is now taking 1000 mg metformin twice/day. SHe brings her bg meter and is checking her bg approx 5x/day, many bg checks are a few minutes apart. She is , lives with her , not currently working.     Diet Recall:   Breakfast:2 eggs/veggies, yogurt, ricotta or laughing cow cheese, sometimes oatmeal or oat bran, coffee/almond milk/truvia  AM snack:none  Lunch:black bean burger with low carb tortilla with cheese, veggies, fruit, sometimes yogurt, sugar free candy  PM snack:granola bar, nuts, fruit, almond milk, sugar free candy  Dinner:quinoa with black beans or chicken/beef or salmon, 1/2 avocado, veg or low carb veggie pizza  Evening snack:yogurt with nuts    Eats out in restaurants: about 1 times per week: Tapas or Chipot[employer]  Beverages: water, coffee, diet coke  ETOH: none   Physical Activity:    Swims 5x/wk for 30-60 minutes  Reviewed basic pathophysiology of type 2 diabetes and insulin resistance focusing on improving insulin resistance with a combination of weight loss, exercise and metformin. Gave April written and verbal information on general healthy eating, low saturated, low trans fat, low sodium and carbohydrate counting. Provided April with information on how to obtain nutrition information online and via smartphone. Discussed benefits of moderate weight loss of 5-10%, approximately 2-4# per month for improved diabetes control regarding blood glucose, blood pressure and lipids. Worked with April to set goals for improved diet and weight loss. Discussed phone apps such as [employer] or "[employer] to help track daily calories with a goal of 4881-0486 per day. Discussed frequency of testing and blood sugar goals today:   fasting and premeal and less than 180 2 hours after meals.      Education provided today on:  AADE Self-Care Behaviors:  Healthy Eating: carbohydrate counting, weight reduction, heart healthy diet, eating out, portion control and label reading  Being Active: relationship to blood glucose and describe appropriate activity program  Monitoring: individual blood glucose targets and frequency of monitoring    Pt verbalized understanding of concepts discussed and recommendations provided today.       Education Materials Provided:  Karina Taking Charge of Your Diabetes Book and Carbohydrate Counting    ASSESSMENT: April is very motivated to control her diabetes and has already lost 5 pounds with diet changes. Verbalized recommendations today, she will focus on calorie reduction towards gradual weight loss. Her current exercise regimen is excellent. Needs full diabetes ed.       PLAN:  See Patient Instructions for co-developed, patient-stated behavior change goals.  AVS printed and provided to patient today.    FOLLOW-UP:  Follow-up appointment scheduled on August 20 at 12:30 with Maya Booker and 1:30 with me.  Chart routed to referring provider.    Time Spent: 60 minutes  Encounter Type: Individual    Any diabetes medication dose changes were made via the CDE Protocol and Collaborative Practice Agreement with the patient's referring provider. A copy of this encounter was shared with the provider.

## 2018-07-11 NOTE — PROGRESS NOTES
DIABETES EDUCATION NOTE:    Alejandra Forbes presents today for education related to Type 2 diabetes.  Patient is being treated with:  oral agents, diet, exercise and insulin  She is accompanied by self    PATIENT CONCERNS RELATED TO DIABETES SELF MANAGEMENT:   Morning blood sugars are the highest    PROGRESS TOWARD GOALS:  Monitoring: Test blood sugar twice a day.  100%  Activity: Get 30 minutes of activity 3 times per week 75%    ASSESSMENT:  Doing well with lifestyle changes    Current Diabetes Management per Patient:  Taking diabetes medications?   yes:     Diabetes Medication(s)     Biguanides Sig    metFORMIN (GLUCOPHAGE-XR) 500 MG 24 hr tablet Take 2 tablets (1,000 mg) by mouth 2 times daily (with meals)        Monitoring  Patient glucose self monitoring as follows: two times daily, four times daily.   BG meter: One Touch Verio Flex meter  BG results: fasting:see meter download        BG values are: in and out of goal  Patient's most recent   Lab Results   Component Value Date    A1C 6.5 05/04/2018    is meeting goal of <7.0      Exercise: swimming 4-5 times a week, for past 2-3 weeks, 30-60 minutes    Nutrition:   Has reduced the amount of carb in her diet, has met with Dinorah and is going to try to stick to 1600 calorie diet    Hypoglycemia:   Patient is at risk of hypoglycemia? Has felt shaky a couple of times after swimming, ate a snack and felt better           Stress Management: having a daily schedule, going to the gym             EDUCATION and INSTRUCTION PROVIDED AT THIS VISIT:    Education provided regarding risk reduction of complications of diabetes  This included:  How atherosclerosis develops and the relationship between LDL, hyperglycemia and hypertension in the development and acceleration of atherosclerosis.    The need to keep all three conditions under good control in order to reduce risk for complications.  Educated as to the particular pathogenesis of micro- and macrovascular  complications.    Reviewed screening tests, goals and frequency of follow up.  A1C:  What it measures.  Goals are individualized.  Checked Q 3-6 months  BP  :  Goals generally under 130/80, although goals are individualized.  Total cholesterol:  Under 200.  Check at least once yearly  LDL:  Under 100 or under 70 if existing heart disease.  Check at least once yearly  Microalbuminuria:  Under 30 mg/dL.  Check at least once yearly.  Screening for retinopathy:  Should be done yearly or more often if indicated  Monofilament testing for neuropathy:  Should be done at least yearly.  Daily foot care reviewed and handout for foot care given.   Use of Aspirin in DM:  Reviewed indications and contraindications with patient.   Patient is currently tobacco free / using tobacco.      Goals for Diabetes Care handout given to patient for review.     Patient-stated goal written and given to Alejandra Forbes.  Verbalized and demonstrated understanding of instructions.     PLAN:  See patient instructions  AVS printed and given to patient    FOLLOW-UP:    As scheduled    Time spent with patient at today's visit was 60 minutes.      Any diabetes medication dose changes were made via the CDE Protocol and Collaborative Practice Agreement with [place] and  [place].  A copy of this encounter was provided to patient's referring provider.

## 2018-07-11 NOTE — MR AVS SNAPSHOT
After Visit Summary   2018    Alejandra Forbes    MRN: [id number]           Patient Information     Date Of Birth          1980        Visit Information        Provider Department      2018 10:30 AM Dinorah Carrillo RD M [facility] [facility]        Care Instructions    1. We reviewed healthy diet today, low saturated low trans fat low sodium diet and carbohydrate counting.  2. To lose weight, approximately 2-4 pounds per month, 5671-7707 calories is sufficient. You can try an eunice/program called [employer]com to help   3. Great job with exercise, continue aerobic activity/swimming at least 5 times/week for at least 30 minutes  4. Blood sugar goals: fastin-130 and before meals: , 2 hours after meals: less than 180  5. Follow up one month:  at 12:30 with Maya Booker and 1:30 with me  Dinorah Carrillo RD, LD, CDE  Diabetes Care  [address]  [address]  [place]  [address]  Phone: [phone]  Appointment line: [phone]  Email: [email or web address]              Follow-ups after your visit        Your next 10 appointments already scheduled     2018  2:30 PM CDT   (Arrive by 2:15 PM)   Office Visit with MICHEAL Starr [facility] [facility] ([facility])    [address] [address] [phone]           Bring a current list of meds and any records pertaining to this visit. For Physicals, please bring immunization records and any forms needing to be filled out. Please arrive 10 minutes early to complete paperwork.            2018  7:45 AM CDT   RETURN RETINA with Genaro De Jesus MD   Eye Clinic ([facility])    [address]  [address] [address] [phone]              Who to contact     Please call your clinic at [phone] to:    Ask questions about your  health    Make or cancel appointments    Discuss your medicines    Learn about your test results    Speak to your doctor            Additional Information About Your Visit        [employer] Information     [employer] gives you secure access to your electronic health record. If you see a primary care provider, you can also send messages to your care team and make appointments. If you have questions, please call your primary care clinic.  If you do not have a primary care provider, please call [phone] and they will assist you.      [employer] is an electronic gateway that provides easy, online access to your medical records. With [employer], you can request a clinic appointment, read your test results, renew a prescription or communicate with your care team.     To access your existing account, please contact your [facility] or call [phone] for assistance.        Care EveryWhere ID     This is your Care EveryWhere ID. This could be used by other organizations to access your [place] medical records  [id number]         Blood Pressure from Last 3 Encounters:   06/21/18 (!) 132/91   06/05/18 (!) 127/94   05/31/18 122/62    Weight from Last 3 Encounters:   06/21/18 103.9 kg (229 lb 0.9 oz)   06/05/18 103.9 kg (229 lb 0.9 oz)   05/31/18 103.9 kg (229 lb)              Today, you had the following     No orders found for display       Primary Care Provider Office Phone # Fax #    Shivani Nuha Clemons [phone] [address] [facility] [address]        Equal Access to Services     DAVION BLUM : Hadii aad ku hadasho Soomaali, waaxda luqadaha, qaybta kaalmada adeegyada, nilo ramos . So [facility] [phone].    ATENCIÓN: Si habla español, tiene a murphy disposición servicios gratuitos de asistencia lingüística. Llame al [phone].    We comply with applicable federal civil rights laws and [place] laws. We do not discriminate on the basis  of race, color, national origin, age, disability, sex, sexual orientation, or gender identity.            Thank you!     Thank you for choosing [facility]  for your care. Our goal is always to provide you with excellent care. Hearing back from our patients is one way we can continue to improve our services. Please take a few minutes to complete the written survey that you may receive in the mail after your visit with us. Thank you!             Your Updated Medication List - Protect others around you: Learn how to safely use, store and throw away your medicines at www.disposemymeds.org.          This list is accurate as of 7/11/18 11:45 AM.  Always use your most recent med list.                   Brand Name Dispense Instructions for use Diagnosis    ACE/ARB/ARNI NOT PRESCRIBED (INTENTIONAL)      Please choose reason not prescribed, below    Type 2 diabetes mellitus with hyperglycemia, without long-term current use of insulin (H)       blood glucose monitoring lancets     500 each    Use to test blood sugars five times daily or as directed.    Type 2 diabetes mellitus with hyperglycemia, without long-term current use of insulin (H)       Blood Glucose Monitoring Suppl Gauri     1 each    1 Device as needed One Touch Verio Flex glucose monitor    Diabetes mellitus (H)       blood glucose monitoring test strip    no brand specified    500 strip    Use to test blood sugars five times daily or as directed    Type 2 diabetes mellitus with hyperglycemia, without long-term current use of insulin (H)       cholecalciferol 1000 UNIT tablet    vitamin D3    90 tablet    Take one tablet daily, starting after you have finished the once a week dosing of the 50,000 Units dose.    MS (multiple sclerosis) (H)       ibuprofen 200 MG tablet    ADVIL/MOTRIN     Take 2-3 tablets by mouth as needed        ketorolac tromethamine 0.4 % Soln ophthalmic solution    ACULAR-LS    1 Bottle    Place 1 drop Into the left eye 2 times daily     Cystoid macular edema of both eyes       metFORMIN 500 MG 24 hr tablet    GLUCOPHAGE-XR    120 tablet    Take 2 tablets (1,000 mg) by mouth 2 times daily (with meals)    Type 2 diabetes mellitus with hyperglycemia, without long-term current use of insulin (H)       norgestrel-ethinyl estradiol 0.3-30 MG-MCG per tablet    LO/OVRAL    90 tablet    Take 1 tablet by mouth daily    Family planning, BCP (birth control pills) maintenance       order for DME     1 Device    Equipment being ordered: nebulizer with tubing    Wheezing       order for DME     1 Device    Equipment being ordered: tall 7 1/2 Aircast boot    Peroneal tendonitis of right lower leg       prednisoLONE acetate 1 % ophthalmic susp    PRED FORTE    1 Bottle    Place 1 drop Into the left eye 3 times daily    Cystoid macular edema of both eyes       zolpidem 5 MG tablet    AMBIEN    30 tablet    Take 1 tablet (5 mg) by mouth nightly as needed for sleep    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

## 2018-08-01 ENCOUNTER — OFFICE VISIT (OUTPATIENT)
Dept: OPHTHALMOLOGY | Facility: CLINIC | Age: 38
End: 2018-08-01
Attending: OPHTHALMOLOGY
Payer: COMMERCIAL

## 2018-08-01 DIAGNOSIS — H30.23: Primary | ICD-10-CM

## 2018-08-01 DIAGNOSIS — H35.359 CYSTOID MACULAR DEGENERATION OF RETINA: ICD-10-CM

## 2018-08-01 DIAGNOSIS — G35: Primary | ICD-10-CM

## 2018-08-01 DIAGNOSIS — H35.353 CYSTOID MACULAR EDEMA OF BOTH EYES: ICD-10-CM

## 2018-08-01 DIAGNOSIS — H26.492 PCO (POSTERIOR CAPSULAR OPACIFICATION), LEFT: ICD-10-CM

## 2018-08-01 PROCEDURE — 66821 AFTER CATARACT LASER SURGERY: CPT | Mod: ZF | Performed by: OPHTHALMOLOGY

## 2018-08-01 PROCEDURE — G0463 HOSPITAL OUTPT CLINIC VISIT: HCPCS | Mod: ZF

## 2018-08-01 PROCEDURE — 92134 CPTRZ OPH DX IMG PST SGM RTA: CPT | Mod: ZF | Performed by: OPHTHALMOLOGY

## 2018-08-01 PROCEDURE — 25000125 ZZHC RX 250: Mod: ZF | Performed by: OPHTHALMOLOGY

## 2018-08-01 RX ORDER — PREDNISOLONE ACETATE 10 MG/ML
1 SUSPENSION/ DROPS OPHTHALMIC 3 TIMES DAILY
Qty: 1 BOTTLE | Refills: 3 | Status: SHIPPED | OUTPATIENT
Start: 2018-08-01 | End: 2019-07-18

## 2018-08-01 RX ADMIN — APRACLONIDINE HYDROCHLORIDE 1 DROP: 10 SOLUTION/ DROPS OPHTHALMIC at 09:23

## 2018-08-01 ASSESSMENT — REFRACTION_WEARINGRX
OS_CYLINDER: +5.00
OS_AXIS: 085
OD_CYLINDER: SPHERE
OD_CYLINDER: +0.25
OD_SPHERE: -2.50
OS_AXIS: 105
OD_AXIS: 057
OS_SPHERE: -2.00
OD_SPHERE: PLANO
OS_SPHERE: -1.00
OS_CYLINDER: +6.00

## 2018-08-01 ASSESSMENT — VISUAL ACUITY
OS_CC: 20/30
OD_CC: 20/40
OD_CC+: -2
CORRECTION_TYPE: GLASSES
METHOD: SNELLEN - LINEAR
OS_CC+: -2

## 2018-08-01 ASSESSMENT — SLIT LAMP EXAM - LIDS
COMMENTS: NORMAL
COMMENTS: NORMAL

## 2018-08-01 ASSESSMENT — CUP TO DISC RATIO
OD_RATIO: 0.2
OS_RATIO: 0.2

## 2018-08-01 ASSESSMENT — CONF VISUAL FIELD
OS_NORMAL: 1
OD_NORMAL: 1

## 2018-08-01 ASSESSMENT — TONOMETRY
IOP_METHOD: TONOPEN
OS_IOP_MMHG: 09
OD_IOP_MMHG: 10

## 2018-08-01 ASSESSMENT — EXTERNAL EXAM - LEFT EYE: OS_EXAM: NORMAL

## 2018-08-01 ASSESSMENT — EXTERNAL EXAM - RIGHT EYE: OD_EXAM: NORMAL

## 2018-08-01 NOTE — MR AVS SNAPSHOT
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      Your next 10 appointments already scheduled     Aug 20, 2018 12:30 PM CDT   (Arrive by 12:15 PM)   Office Visit with Maya Booker RN   [facility] ([facility])    [address] [place] [id number] [phone]           Bring a current list of meds and any records pertaining to this visit. For Physicals, please bring immunization records and any forms needing to be filled out. Please arrive 10 minutes early to complete paperwork.            Aug 20, 2018  1:30 PM CDT   (Arrive by 1:15 PM)   Office Visit with Dinorah Carrillo RD   [facility] ([facility])    [address] [place] [address] [phone]           Bring a current list of meds and any records pertaining to this visit. For Physicals, please bring immunization records and any forms needing to be filled out. Please arrive 10 minutes early to complete paperwork.            Aug 23, 2018  7:45 AM CDT   RETURN RETINA with Genaro De Jesus MD   [facility] ([facility])    [place] [address]  [address] [id number] [phone]              Who to contact     Please call your clinic at  [phone] to:    Ask questions about your health    Make or cancel appointments    Discuss your medicines    Learn about your test results    Speak to your doctor            Additional Information About Your Visit        TenderTreehar[employer]springr Information     uiu gives you secure access to your electronic health record. If you see a primary care provider, you can also send messages to your care team and make appointments. If you have questions, please call your primary care clinic.  If you do not have a primary care provider, please call [phone] and they will assist you.      uiu is an electronic gateway that provides easy, online access to your medical records. With uiu, you can request a clinic appointment, read your test results, renew a prescription or communicate with your care team.     To access your existing account, please contact your [facility] or call [phone] for assistance.        Care EveryWhere ID     This is your Care EveryWhere ID. This could be used by other organizations to access your [facility] medical records  [id number]         Blood Pressure from Last 3 Encounters:   06/21/18 (!) 132/91   06/05/18 (!) 127/94   05/31/18 122/62    Weight from Last 3 Encounters:   07/11/18 104.1 kg (229 lb 6.4 oz)   06/21/18 103.9 kg (229 lb 0.9 oz)   06/05/18 103.9 kg (229 lb 0.9 oz)              We Performed the Following     OCT Retina Spectralis OU (both eyes)     YAG Capsulotomy OS (left eye)          Where to get your medicines      These medications were sent to [facility] [address] [facility] - [place], [place] - [address], [place] [address]     Phone:  [phone]     prednisoLONE acetate 1 % ophthalmic susp          Primary Care Provider Office Phone # Fax #    Shivani Clemons [phone] [address] [facility] [address]        Equal Access to Services     DAVINO BLUM AH: дмитрий Lugo  natalie thomasjuanitaclaude mcculloughnilo rivera. So [facility] [phone].    ATENCIÓN: Si tiffanie luna, tiene a murphy disposición servicios gratuitos de asistencia lingüística. Toddame al [phone].    We comply with applicable federal civil rights laws and [place] laws. We do not discriminate on the basis of race, color, national origin, age, disability, sex, sexual orientation, or gender identity.            Thank you!     Thank you for choosing [facility]  for your care. Our goal is always to provide you with excellent care. Hearing back from our patients is one way we can continue to improve our services. Please take a few minutes to complete the written survey that you may receive in the mail after your visit with us. Thank you!             Your Updated Medication List - Protect others around you: Learn how to safely use, store and throw away your medicines at www.disposemymeds.org.          This list is accurate as of 8/1/18  9:19 AM.  Always use your most recent med list.                   Brand Name Dispense Instructions for use Diagnosis    ACE/ARB/ARNI NOT PRESCRIBED (INTENTIONAL)      Please choose reason not prescribed, below    Type 2 diabetes mellitus with hyperglycemia, without long-term current use of insulin (H)       blood glucose monitoring lancets     500 each    Use to test blood sugars five times daily or as directed.    Type 2 diabetes mellitus with hyperglycemia, without long-term current use of insulin (H)       Blood Glucose Monitoring Suppl Gauri     1 each    1 Device as needed One Touch Verio Flex glucose monitor    Diabetes mellitus (H)       blood glucose monitoring test strip    no brand specified    500 strip    Use to test blood sugars five times daily or as directed    Type 2 diabetes mellitus with hyperglycemia, without long-term current use of insulin (H)       cholecalciferol 1000 UNIT tablet    vitamin D3    90 tablet    Take one tablet daily,  starting after you have finished the once a week dosing of the 50,000 Units dose.    MS (multiple sclerosis) (H)       ibuprofen 200 MG tablet    ADVIL/MOTRIN     Take 2-3 tablets by mouth as needed        Interferon Beta-1a 44 MCG/0.5ML Soaj    REBIF REBIDOSE    12 Syringe    Inject 44 mcg Subcutaneous three times a week    Multiple sclerosis (H)       metFORMIN 500 MG 24 hr tablet    GLUCOPHAGE-XR    120 tablet    Take 2 tablets (1,000 mg) by mouth 2 times daily (with meals)    Type 2 diabetes mellitus with hyperglycemia, without long-term current use of insulin (H)       norgestrel-ethinyl estradiol 0.3-30 MG-MCG per tablet    LO/OVRAL    90 tablet    Take 1 tablet by mouth daily    Family planning, BCP (birth control pills) maintenance       order for DME     1 Device    Equipment being ordered: nebulizer with tubing    Wheezing       order for DME     1 Device    Equipment being ordered: tall 7 1/2 Aircast boot    Peroneal tendonitis of right lower leg       prednisoLONE acetate 1 % ophthalmic susp    PRED FORTE    1 Bottle    Place 1 drop Into the left eye 3 times daily    Cystoid macular edema of both eyes       zolpidem 5 MG tablet    AMBIEN    30 tablet    Take 1 tablet (5 mg) by mouth nightly as needed for sleep    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

## 2018-08-01 NOTE — PROGRESS NOTES
I have confirmed the patient's and reviewed Past Medical History, Past Surgical History, Social History, Family History, Problem List, Medication List and agree with Tech note.    CC: Follow-up for intermediate uveitis    HPI: Alejandra Forbes is a 37 year old female with history of multiple sclerosis with history of uveitis, left eye who presents today for evaluation of intermediate uveitis.  She has been taking topical drops for retinal edema of the left eye.  Recently restarted interferon (~1 month ago) for MS because she had a recent MRI which showed many MS lesions.    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 Posterior capsule remnant, left eye   - Highly mobile on exam and significant in visual axis   - Plan for Yag Cap left eye today   - Follow-up in 3 weeks   - continue pred forte as listed below    1. Intermediate uveitis with cystoid macular edema, left eye   - cystoid macular edema resolved based on today's (08/01/18) OCT   - significant improvement in VA with topical drops and new glasses perscription   - previous history of ocular steroid and oral prednisone use with steroid response.    - stop Ketorolac   - continue pred forte three times a day     2. Uveitic vs steroid responder Glaucoma    - s/p Trab OU in 2006 (Dr. Edward)   - Follows with Rolly/Wagner    3. Epiretinal membrane LEFT EYE>RIGHT EYE   - Stable, monitor    3. PCIOL left eye     4. PSC RIGHT EYE   - Visually significant, patient wishes to observe for now    5. Multiple Sclerosis     following with Neurology      RTC 3 weeks yag cap left eye re-check    Dylon Potter M.D.  PGY-3, Ophthalmology      ATTESTATION:     I have seen and examined the patient with Dr. Potter and agree with the findings in this note, and the interpretation of the diagnostic  tests.  I was present for procedure.     Genaro Mohan MD PhD.  Professor & Chair

## 2018-08-01 NOTE — NURSING NOTE
Chief Complaints and History of Present Illnesses   Patient presents with     Follow Up For     Cystoid macular edema of both eyes      HPI    Affected eye(s):  Both   Symptoms:        Duration:  3 months   Frequency:  Constant       Do you have eye pain now?:  No      Comments:  Pt. States that VA is still blurry-no change since last visit.   Still seeing floaters BE.  No flashes BE.  No c/o comfort BE.  Fariha UPTON 8:01 AM August 1, 2018

## 2018-08-29 ENCOUNTER — OFFICE VISIT (OUTPATIENT)
Dept: OPHTHALMOLOGY | Facility: CLINIC | Age: 38
End: 2018-08-29
Attending: OPHTHALMOLOGY
Payer: COMMERCIAL

## 2018-08-29 DIAGNOSIS — G35: ICD-10-CM

## 2018-08-29 DIAGNOSIS — H30.23: ICD-10-CM

## 2018-08-29 DIAGNOSIS — H26.492 PCO (POSTERIOR CAPSULAR OPACIFICATION), LEFT: Primary | ICD-10-CM

## 2018-08-29 PROCEDURE — G0463 HOSPITAL OUTPT CLINIC VISIT: HCPCS | Mod: ZF

## 2018-08-29 ASSESSMENT — EXTERNAL EXAM - RIGHT EYE: OD_EXAM: NORMAL

## 2018-08-29 ASSESSMENT — VISUAL ACUITY
OD_PH_CC: 20/30-1
OD_CC+: -1
METHOD: SNELLEN - LINEAR
OS_CC+: +2
OS_CC: 20/40
OD_CC: 20/40

## 2018-08-29 ASSESSMENT — CONF VISUAL FIELD
METHOD: COUNTING FINGERS
OD_NORMAL: 1
OS_NORMAL: 1

## 2018-08-29 ASSESSMENT — REFRACTION_WEARINGRX
OS_AXIS: 085
OD_AXIS: 057
OS_AXIS: 088
OS_SPHERE: -1.00
OS_CYLINDER: +5.00
OD_SPHERE: -3.25
OS_SPHERE: -2.50
OD_SPHERE: PLANO
OS_CYLINDER: +6.00
OD_AXIS: 013
OD_CYLINDER: +0.25
OD_CYLINDER: +0.50

## 2018-08-29 ASSESSMENT — CUP TO DISC RATIO
OS_RATIO: 0.2
OD_RATIO: 0.2

## 2018-08-29 ASSESSMENT — SLIT LAMP EXAM - LIDS
COMMENTS: NORMAL
COMMENTS: NORMAL

## 2018-08-29 ASSESSMENT — EXTERNAL EXAM - LEFT EYE: OS_EXAM: NORMAL

## 2018-08-29 ASSESSMENT — TONOMETRY
OD_IOP_MMHG: 14
OS_IOP_MMHG: 9
IOP_METHOD: TONOPEN

## 2018-08-29 NOTE — PROGRESS NOTES
I have confirmed the patient's and reviewed Past Medical History, Past Surgical History, Social History, Family History, Problem List, Medication List and agree with Tech note.    CC: Follow-up for iyag left eye     HPI: we did another yag-cap to breakup a remant of posterior capsular opacity (PCO) that was mobile in the visual axis.     Alejandra Forbes is a 38 year old female with history of multiple sclerosis with history of uveitis, left eye who presents today for evaluation of intermediate uveitis.  She has been taking topical drops for retinal edema of the left eye.  Recently restarted interferon (~1 month ago) for MS because she had a recent MRI which showed many MS lesions.    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 Posterior capsule remnant, left eye   - Highly mobile on exam and significant in visual axis, this is more fanint now    - Plan for observation left eye today   - Follow-up in 6 weeks   - continue pred forte as listed below    1. Intermediate uveitis with cystoid macular edema, left eye   - cystoid macular edema resolved based on today's (08/01/18) OCT   - significant improvement in VA with topical drops and new glasses perscription   - previous history of ocular steroid and oral prednisone use with steroid response.    - stop Ketorolac   - continue pred forte one time a day     2. Uveitic vs steroid responder Glaucoma    - s/p Trab OU in 2006 (Dr. Edward)   - Follows with Rolly/Wagner    3. Epiretinal membrane LEFT EYE>RIGHT EYE   - Stable, monitor    3. PCIOL left eye     4. PSC RIGHT EYE   - Visually significant, patient wishes to observe for now    5. Multiple Sclerosis     following with Neurology      RTC 6 weeks re-check dilated fundus exam left eye to look for posterior capsular opacity (PCO) remnant    .      Genaro Mohan MD PhD.  Professor & Chair

## 2018-08-29 NOTE — MR AVS SNAPSHOT
After Visit Summary   8/29/2018 April FRANCIS Forbes    MRN: [id number]           Patient Information     Date Of Birth          1980        Visit Information        Provider Department      8/29/2018 7:45 AM [facility]        Today's Diagnoses     PCO (posterior capsular opacification), left    -  1    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)           Follow-ups after your visit        Follow-up notes from your care team     Return in about 6 weeks (around 10/10/2018) for s/p yag cap OS 8/2018 for mobile remant .      Who to contact     Please call your clinic at [phone] to:    Ask questions about your health    Make or cancel appointments    Discuss your medicines    Learn about your test results    Speak to your doctor            Additional Information About Your Visit        MyChart Information     [employer] gives you secure access to your electronic health record. If you see a primary care provider, you can also send messages to your care team and make appointments. If you have questions, please call your primary care clinic.  If you do not have a primary care provider, please call [phone] and they will assist you.      [employer] is an electronic gateway that provides easy, online access to your medical records. With [employer], you can request a clinic appointment, read your test results, renew a prescription or communicate with your care team.     To access your existing account, please contact your [facility] or call [phone] for assistance.        Care EveryWhere ID     This is your Care EveryWhere ID. This could be used by other organizations to access your [facility] medical records  [id number]         Blood Pressure from Last 3 Encounters:   06/21/18 (!) 132/91   06/05/18 (!) 127/94   05/31/18 122/62    Weight from Last 3 Encounters:   07/11/18 104.1 kg (229 lb 6.4 oz)   06/21/18  103.9 kg (229 lb 0.9 oz)   06/05/18 103.9 kg (229 lb 0.9 oz)              Today, you had the following     No orders found for display       Primary Care Provider Office Phone # Fax #    Shivani Clemons [phone] [address] [facility] [address]        Equal Access to Services     [facility]: Hadii aad ku hadasho Soomaali, waaxda luqadaha, qaybta kaalmada adeegyada, waxay idiin hayaan adeeg kannan lajarrodn . So [facility] [phone].    ATENCIÓN: Si habla español, tiene a murphy disposición servicios gratuitos de asistencia lingüística. Llame al [phone].    We comply with applicable federal civil rights laws and [place] laws. We do not discriminate on the basis of race, color, national origin, age, disability, sex, sexual orientation, or gender identity.            Thank you!     Thank you for choosing [facility]  for your care. Our goal is always to provide you with excellent care. Hearing back from our patients is one way we can continue to improve our services. Please take a few minutes to complete the written survey that you may receive in the mail after your visit with us. Thank you!             Your Updated Medication List - Protect others around you: Learn how to safely use, store and throw away your medicines at www.disposemymeds.org.          This list is accurate as of 8/29/18  8:48 AM.  Always use your most recent med list.                   Brand Name Dispense Instructions for use Diagnosis    ACE/ARB/ARNI NOT PRESCRIBED (INTENTIONAL)      Please choose reason not prescribed, below    Type 2 diabetes mellitus with hyperglycemia, without long-term current use of insulin (H)       blood glucose monitoring lancets     500 each    Use to test blood sugars five times daily or as directed.    Type 2 diabetes mellitus with hyperglycemia, without long-term current use of insulin (H)       Blood Glucose Monitoring Suppl Gauri     1 each    1 Device as needed One Touch Verio  Flex glucose monitor    Diabetes mellitus (H)       blood glucose monitoring test strip    no brand specified    500 strip    Use to test blood sugars five times daily or as directed    Type 2 diabetes mellitus with hyperglycemia, without long-term current use of insulin (H)       cholecalciferol 1000 UNIT tablet    vitamin D3    90 tablet    Take one tablet daily, starting after you have finished the once a week dosing of the 50,000 Units dose.    MS (multiple sclerosis) (H)       ibuprofen 200 MG tablet    ADVIL/MOTRIN     Take 2-3 tablets by mouth as needed        Interferon Beta-1a 44 MCG/0.5ML Soaj    REBIF REBIDOSE    12 Syringe    Inject 44 mcg Subcutaneous three times a week    Multiple sclerosis (H)       metFORMIN 500 MG 24 hr tablet    GLUCOPHAGE-XR    120 tablet    Take 2 tablets (1,000 mg) by mouth 2 times daily (with meals)    Type 2 diabetes mellitus with hyperglycemia, without long-term current use of insulin (H)       norgestrel-ethinyl estradiol 0.3-30 MG-MCG per tablet    LO/OVRAL    90 tablet    Take 1 tablet by mouth daily    Family planning, BCP (birth control pills) maintenance       order for DME     1 Device    Equipment being ordered: nebulizer with tubing    Wheezing       order for DME     1 Device    Equipment being ordered: tall 7 1/2 Aircast boot    Peroneal tendonitis of right lower leg       prednisoLONE acetate 1 % ophthalmic susp    PRED FORTE    1 Bottle    Place 1 drop Into the left eye 3 times daily    Cystoid macular edema of both eyes       zolpidem 5 MG tablet    AMBIEN    30 tablet    Take 1 tablet (5 mg) by mouth nightly as needed for sleep    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

## 2018-08-29 NOTE — NURSING NOTE
Chief Complaints and History of Present Illnesses   Patient presents with     Follow Up For     3 week s/p YAG OS     HPI    Last Eye Exam:  8/1/18   Affected eye(s):  Left   Symptoms:        Duration:  3 weeks   Frequency:  Constant       Do you have eye pain now?:  No      Comments:  April is here for a follow up of YAG left eye. She says her eyes feel good, and not sure if vision improved.     Bashir Martínez COT 8:12 AM August 29, 2018

## 2018-09-04 DIAGNOSIS — E11.65 TYPE 2 DIABETES MELLITUS WITH HYPERGLYCEMIA, WITHOUT LONG-TERM CURRENT USE OF INSULIN (H): ICD-10-CM

## 2018-09-05 RX ORDER — METFORMIN HCL 500 MG
TABLET, EXTENDED RELEASE 24 HR ORAL
Qty: 120 TABLET | Refills: 0 | Status: SHIPPED | OUTPATIENT
Start: 2018-09-05 | End: 2018-10-01

## 2018-09-05 NOTE — TELEPHONE ENCOUNTER
"Requested Prescriptions   Pending Prescriptions Disp Refills     metFORMIN (GLUCOPHAGE-XR) 500 MG 24 hr tablet [Pharmacy Med Name: METFORMIN  MG TABLET] 120 tablet 3     Sig: TAKE 2 TABLETS (1,000 MG) BY MOUTH 2 TIMES DAILY (WITH MEALS)    Biguanide Agents Failed    9/4/2018  7:00 PM       Failed - Blood pressure less than 140/90 in past 6 months    BP Readings from Last 3 Encounters:   06/21/18 (!) 132/91   06/05/18 (!) 127/94   05/31/18 122/62                Passed - Patient is age 10 or older       Passed - Recent (12 mo) or future (30 days) visit within the authorizing provider's specialty     Patient had office visit in the last 12 months or has a visit in the next 30 days with authorizing provider or within the authorizing provider's specialty.  See \"Patient Info\" tab in inbasket, or \"Choose Columns\" in Meds & Orders section of the refill encounter.           Passed - Patient does NOT have a diagnosis of CHF.       Passed - Patient is not pregnant       Passed - Patient has not had a positive pregnancy test within the past 12 mos.         "

## 2018-09-05 NOTE — TELEPHONE ENCOUNTER
Medication is being filled for 1 time refill only due to:  Patient needs to be seen because due for 3-4 month f/u appt.   Cherelle ADAN RN

## 2018-09-26 DIAGNOSIS — E11.65 TYPE 2 DIABETES MELLITUS WITH HYPERGLYCEMIA, WITHOUT LONG-TERM CURRENT USE OF INSULIN (H): ICD-10-CM

## 2018-09-26 RX ORDER — METFORMIN HCL 500 MG
TABLET, EXTENDED RELEASE 24 HR ORAL
Start: 2018-09-26

## 2018-10-01 ENCOUNTER — OFFICE VISIT (OUTPATIENT)
Dept: FAMILY MEDICINE | Facility: CLINIC | Age: 38
End: 2018-10-01
Payer: COMMERCIAL

## 2018-10-01 DIAGNOSIS — Z23 NEED FOR VACCINATION: ICD-10-CM

## 2018-10-01 DIAGNOSIS — G35 MULTIPLE SCLEROSIS (H): ICD-10-CM

## 2018-10-01 DIAGNOSIS — J45.20 MILD INTERMITTENT ASTHMA WITHOUT COMPLICATION: ICD-10-CM

## 2018-10-01 DIAGNOSIS — E11.65 TYPE 2 DIABETES MELLITUS WITH HYPERGLYCEMIA, WITHOUT LONG-TERM CURRENT USE OF INSULIN (H): Primary | ICD-10-CM

## 2018-10-01 DIAGNOSIS — I10 BENIGN ESSENTIAL HYPERTENSION: ICD-10-CM

## 2018-10-01 LAB
ANION GAP SERPL CALCULATED.3IONS-SCNC: 7 MMOL/L (ref 3–14)
BUN SERPL-MCNC: 9 MG/DL (ref 7–30)
CALCIUM SERPL-MCNC: 8.6 MG/DL (ref 8.5–10.1)
CHLORIDE SERPL-SCNC: 102 MMOL/L (ref 94–109)
CO2 SERPL-SCNC: 25 MMOL/L (ref 20–32)
CREAT SERPL-MCNC: 0.6 MG/DL (ref 0.52–1.04)
GFR SERPL CREATININE-BSD FRML MDRD: >90 ML/MIN/1.7M2
GLUCOSE SERPL-MCNC: 125 MG/DL (ref 70–99)
HBA1C MFR BLD: 6.1 % (ref 0–5.6)
POTASSIUM SERPL-SCNC: 4.1 MMOL/L (ref 3.4–5.3)
SODIUM SERPL-SCNC: 134 MMOL/L (ref 133–144)

## 2018-10-01 PROCEDURE — 90472 IMMUNIZATION ADMIN EACH ADD: CPT | Performed by: FAMILY MEDICINE

## 2018-10-01 PROCEDURE — 36415 COLL VENOUS BLD VENIPUNCTURE: CPT | Performed by: FAMILY MEDICINE

## 2018-10-01 PROCEDURE — 99214 OFFICE O/P EST MOD 30 MIN: CPT | Mod: 25 | Performed by: FAMILY MEDICINE

## 2018-10-01 PROCEDURE — 83036 HEMOGLOBIN GLYCOSYLATED A1C: CPT | Performed by: FAMILY MEDICINE

## 2018-10-01 PROCEDURE — 90732 PPSV23 VACC 2 YRS+ SUBQ/IM: CPT | Performed by: FAMILY MEDICINE

## 2018-10-01 PROCEDURE — 90471 IMMUNIZATION ADMIN: CPT | Performed by: FAMILY MEDICINE

## 2018-10-01 PROCEDURE — 90686 IIV4 VACC NO PRSV 0.5 ML IM: CPT | Performed by: FAMILY MEDICINE

## 2018-10-01 PROCEDURE — 80048 BASIC METABOLIC PNL TOTAL CA: CPT | Performed by: FAMILY MEDICINE

## 2018-10-01 RX ORDER — METFORMIN HCL 500 MG
TABLET, EXTENDED RELEASE 24 HR ORAL
Qty: 120 TABLET | Refills: 3 | Status: SHIPPED | OUTPATIENT
Start: 2018-10-01 | End: 2019-03-08

## 2018-10-01 RX ORDER — LISINOPRIL 2.5 MG/1
2.5 TABLET ORAL DAILY
Qty: 30 TABLET | Refills: 3 | Status: SHIPPED | OUTPATIENT
Start: 2018-10-01 | End: 2019-02-05

## 2018-10-01 RX ORDER — ALBUTEROL SULFATE 90 UG/1
2 AEROSOL, METERED RESPIRATORY (INHALATION) EVERY 6 HOURS PRN
Qty: 1 INHALER | Refills: 11 | Status: SHIPPED | OUTPATIENT
Start: 2018-10-01 | End: 2019-07-18

## 2018-10-01 NOTE — PATIENT INSTRUCTIONS
1. Type 2 diabetes mellitus with hyperglycemia, without long-term current use of insulin (H)  Plan: check A1C  - metFORMIN (GLUCOPHAGE-XR) 500 MG 24 hr tablet; TAKE 2 TABLETS (1,000 MG) BY MOUTH 2 TIMES DAILY (WITH MEALS)  Dispense: 120 tablet; Refill: 3  - lisinopril (PRINIVIL/ZESTRIL) 2.5 MG tablet; Take 1 tablet (2.5 mg) by mouth daily  Dispense: 30 tablet; Refill: 3  - Hemoglobin A1c  - Basic metabolic panel    2. Benign essential hypertension  Plan: checked BP twice   Advised to start medications and recheck either as nurse only appointment or see provider   - lisinopril (PRINIVIL/ZESTRIL) 2.5 MG tablet; Take 1 tablet (2.5 mg) by mouth daily  Dispense: 30 tablet; Refill: 3    The main side effects to watch for are light headedness, a dry cough, or rare allergic reactions, such as swelling of the lips or tongue.   Most people tolerate the medication well.    It is important to check the blood tests again in 2-4 weeks to be sure you tolerate it.  Your blood pressure can be checked then too.    3. Mild intermittent asthma without complication  Plan: history of excercise induced asthma in childhood  Recurring symptoms   - albuterol (PROAIR HFA/PROVENTIL HFA/VENTOLIN HFA) 108 (90 Base) MCG/ACT inhaler; Inhale 2 puffs into the lungs every 6 hours as needed for shortness of breath / dyspnea or wheezing  Dispense: 1 Inhaler; Refill: 11    4. Multiple sclerosis (H)  Plan: Under care of Dr Sheffield- new MRI in June 2018, Currently restarted since June '2018 interferon 3 times a week.  Follow up MRI in dec 2018

## 2018-10-01 NOTE — MR AVS SNAPSHOT
After Visit Summary   10/1/2018    April FRANCIS Forbes    MRN: [id number]           Patient Information     Date Of Birth          1980        Visit Information        Provider Department      10/1/2018 9:20 AM Shivani Clemons MD [facility]        Today's Diagnoses     Type 2 diabetes mellitus with hyperglycemia, without long-term current use of insulin (H)    -  1    Benign essential hypertension        Mild intermittent asthma without complication        Multiple sclerosis (H)        Need for vaccination          Care Instructions    1. Type 2 diabetes mellitus with hyperglycemia, without long-term current use of insulin (H)  Plan: check A1C  - metFORMIN (GLUCOPHAGE-XR) 500 MG 24 hr tablet; TAKE 2 TABLETS (1,000 MG) BY MOUTH 2 TIMES DAILY (WITH MEALS)  Dispense: 120 tablet; Refill: 3  - lisinopril (PRINIVIL/ZESTRIL) 2.5 MG tablet; Take 1 tablet (2.5 mg) by mouth daily  Dispense: 30 tablet; Refill: 3  - Hemoglobin A1c  - Basic metabolic panel    2. Benign essential hypertension  Plan: checked BP twice   Advised to start medications and recheck either as nurse only appointment or see provider   - lisinopril (PRINIVIL/ZESTRIL) 2.5 MG tablet; Take 1 tablet (2.5 mg) by mouth daily  Dispense: 30 tablet; Refill: 3    The main side effects to watch for are light headedness, a dry cough, or rare allergic reactions, such as swelling of the lips or tongue.   Most people tolerate the medication well.    It is important to check the blood tests again in 2-4 weeks to be sure you tolerate it.  Your blood pressure can be checked then too.    3. Mild intermittent asthma without complication  Plan: history of excercise induced asthma in childhood  Recurring symptoms   - albuterol (PROAIR HFA/PROVENTIL HFA/VENTOLIN HFA) 108 (90 Base) MCG/ACT inhaler; Inhale 2 puffs into the lungs every 6 hours as needed for shortness of breath / dyspnea or wheezing  Dispense: 1 Inhaler; Refill: 11    4. Multiple  sclerosis (H)  Plan: Under care of Dr Sheffield- new MRI in June 2018, Currently restarted since June '2018 interferon 3 times a week.  Follow up MRI in dec 2018                Follow-ups after your visit        Follow-up notes from your care team     Return in about 2 weeks (around 10/15/2018) for BP Recheck.      Your next 10 appointments already scheduled     Oct 08, 2018  1:15 PM CDT   RETURN RETINA with Genaro De Jesus MD   Eye Clinic ([facility])    [place] [address]  [address] [address]  [facility] [address]   [phone]            Oct 16, 2018 10:00 AM CDT   Nurse Only with UP NURSE   [place] [facility] ([facility])    [address]  [facility] [id number] [phone]              Future tests that were ordered for you today     Open Future Orders        Priority Expected Expires Ordered    Urea nitrogen Routine  11/30/2018 10/2/2018    Creatinine Routine  11/30/2018 10/2/2018    Potassium Routine  11/30/2018 10/2/2018            Who to contact     If you have questions or need follow up information about today's clinic visit or your schedule please contact [facility] directly at [phone].  Normal or non-critical lab and imaging results will be communicated to you by Cardochart, letter or phone within 4 business days after the clinic has received the results. If you do not hear from us within 7 days, please contact the clinic through Cardochart or phone. If you have a critical or abnormal lab result, we will notify you by phone as soon as possible.  Submit refill requests through [email or web address] or call your pharmacy and they will forward the refill request to us. Please allow 3 business days for your refill to be completed.          Additional Information About Your Visit        Cardochariovation Information     [email or web address] gives you secure access to your electronic health record. If you see a primary care provider, you can also send  "messages to your care team and make appointments. If you have questions, please call your primary care clinic.  If you do not have a primary care provider, please call [phone] and they will assist you.        Care EveryWhere ID     This is your Care EveryWhere ID. This could be used by other organizations to access your [place] medical records  [id number]        Your Vitals Were     Pulse Temperature Respirations Height Pulse Oximetry Breastfeeding?    73 98  F (36.7  C) (Oral) 14 5' 2\" (1.575 m) 100% No    BMI (Body Mass Index)                   41.15 kg/m2            Blood Pressure from Last 3 Encounters:   10/01/18 (!) 140/94   06/21/18 (!) 132/91   06/05/18 (!) 127/94    Weight from Last 3 Encounters:   10/01/18 225 lb (102.1 kg)   07/11/18 229 lb 6.4 oz (104.1 kg)   06/21/18 229 lb 0.9 oz (103.9 kg)              We Performed the Following     Basic metabolic panel     EA ADD'L VACCINE     FLU VAC PRESRV FREE QUAD SPLIT VIR, IM (3+ YRS)     Hemoglobin A1c     PPSV23, IM/SUBQ (2+ YRS) - [id number]          Today's Medication Changes          These changes are accurate as of 10/1/18 11:59 PM.  If you have any questions, ask your nurse or doctor.               Start taking these medicines.        Dose/Directions    albuterol 108 (90 Base) MCG/ACT inhaler   Commonly known as:  PROAIR HFA/PROVENTIL HFA/VENTOLIN HFA   Used for:  Mild intermittent asthma without complication   Started by:  Shivani Clemons MD        Dose:  2 puff   Inhale 2 puffs into the lungs every 6 hours as needed for shortness of breath / dyspnea or wheezing   Quantity:  1 Inhaler   Refills:  11       lisinopril 2.5 MG tablet   Commonly known as:  PRINIVIL/Zestril   Used for:  Benign essential hypertension, Type 2 diabetes mellitus with hyperglycemia, without long-term current use of insulin (H)   Started by:  Shivani Clemons MD        Dose:  2.5 mg   Take 1 tablet (2.5 mg) by mouth daily   Quantity:  30 tablet   Refills:  3    "      Stop taking these medicines if you haven't already. Please contact your care team if you have questions.     ACE/ARB/ARNI NOT PRESCRIBED (INTENTIONAL)   Stopped by:  Shivani Clemons MD                Where to get your medicines      These medications were sent to [facility] [address] [facility] - [place], [place] - [address] [facility]  [address], [place] [address]     Phone:  [phone]     albuterol 108 (90 Base) MCG/ACT inhaler    lisinopril 2.5 MG tablet    metFORMIN 500 MG 24 hr tablet                Primary Care Provider Office Phone # Fax #    Shivani Clemons [phone] [address] [facility] [address]        Equal Access to Services     [facility] Center[facility]RI[facility] : Delilah lopezo Teodoro, waaxda luqadaha, qaybta kaalmada ademiguel, nilo ramos . So [facility] [phone].    ATENCIÓN: Si habla español, tiene a murphy disposición servicios gratuitos de asistencia lingüística. [facility] [phone].    We comply with applicable federal civil rights laws and [place] laws. We do not discriminate on the basis of race, color, national origin, age, disability, sex, sexual orientation, or gender identity.            Thank you!     Thank you for choosing [facility]  for your care. Our goal is always to provide you with excellent care. Hearing back from our patients is one way we can continue to improve our services. Please take a few minutes to complete the written survey that you may receive in the mail after your visit with us. Thank you!             Your Updated Medication List - Protect others around you: Learn how to safely use, store and throw away your medicines at www.disposemymeds.org.          This list is accurate as of 10/1/18 11:59 PM.  Always use your most recent med list.                   Brand Name Dispense Instructions for use Diagnosis    albuterol 108 (90 Base) MCG/ACT inhaler    PROAIR HFA/PROVENTIL HFA/VENTOLIN HFA    1  Inhaler    Inhale 2 puffs into the lungs every 6 hours as needed for shortness of breath / dyspnea or wheezing    Mild intermittent asthma without complication       blood glucose monitoring lancets     500 each    Use to test blood sugars five times daily or as directed.    Type 2 diabetes mellitus with hyperglycemia, without long-term current use of insulin (H)       Blood Glucose Monitoring Suppl Gauri     1 each    1 Device as needed One Touch Verio Flex glucose monitor    Diabetes mellitus (H)       blood glucose monitoring test strip    no brand specified    500 strip    Use to test blood sugars five times daily or as directed    Type 2 diabetes mellitus with hyperglycemia, without long-term current use of insulin (H)       cholecalciferol 1000 UNIT tablet    vitamin D3    90 tablet    Take one tablet daily, starting after you have finished the once a week dosing of the 50,000 Units dose.    MS (multiple sclerosis) (H)       ibuprofen 200 MG tablet    ADVIL/MOTRIN     Take 2-3 tablets by mouth as needed        Interferon Beta-1a 44 MCG/0.5ML Soaj    REBIF REBIDOSE    12 Syringe    Inject 44 mcg Subcutaneous three times a week    Multiple sclerosis (H)       lisinopril 2.5 MG tablet    PRINIVIL/Zestril    30 tablet    Take 1 tablet (2.5 mg) by mouth daily    Benign essential hypertension, Type 2 diabetes mellitus with hyperglycemia, without long-term current use of insulin (H)       metFORMIN 500 MG 24 hr tablet    GLUCOPHAGE-XR    120 tablet    TAKE 2 TABLETS (1,000 MG) BY MOUTH 2 TIMES DAILY (WITH MEALS)    Type 2 diabetes mellitus with hyperglycemia, without long-term current use of insulin (H)       norgestrel-ethinyl estradiol 0.3-30 MG-MCG per tablet    LO/OVRAL    90 tablet    Take 1 tablet by mouth daily    Family planning, BCP (birth control pills) maintenance       order for DME     1 Device    Equipment being ordered: nebulizer with tubing    Wheezing       prednisoLONE acetate 1 % ophthalmic susp     PRED FORTE    1 Bottle    Place 1 drop Into the left eye 3 times daily    Cystoid macular edema of both eyes       zolpidem 5 MG tablet    AMBIEN    30 tablet    Take 1 tablet (5 mg) by mouth nightly as needed for sleep    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

## 2018-10-01 NOTE — LETTER
My Asthma Action Plan  Name: Alejandra Forbes   YOB: 1980  Date: 10/1/2018   My doctor: Shivani Clemons MD   My clinic: [facility]        My Control Medicine: None  My Rescue Medicine: Albuterol (Proair/Ventolin/Proventil) inhaler 1-2 puff as needed before excercise or as needed with URI/cough up to 4 times a day    My Asthma Severity: intermittent  Avoid your asthma triggers: upper respiratory infections and exercise or sports               GREEN ZONE   Good Control    I feel good    No cough or wheeze    Can work, sleep and play without asthma symptoms       Take your asthma control medicine every day.     1. If exercise triggers your asthma, take your rescue medication    15 minutes before exercise or sports, and    During exercise if you have asthma symptoms  2. Spacer to use with inhaler: If you have a spacer, make sure to use it with your inhaler             YELLOW ZONE Getting Worse  I have ANY of these:    I do not feel good    Cough or wheeze    Chest feels tight    Wake up at night   1. Keep taking your Green Zone medications  2. Start taking your rescue medicine:    every 20 minutes for up to 1 hour. Then every 4 hours for 24-48 hours.  3. If you stay in the Yellow Zone for more than 12-24 hours, contact your doctor.  4. If you do not return to the Green Zone in 12-24 hours or you get worse, start taking your oral steroid medicine if prescribed by your provider.           RED ZONE Medical Alert - Get Help  I have ANY of these:    I feel awful    Medicine is not helping    Breathing getting harder    Trouble walking or talking    Nose opens wide to breathe       1. Take your rescue medicine NOW  2. If your provider has prescribed an oral steroid medicine, start taking it NOW  3. Call your doctor NOW  4. If you are still in the Red Zone after 20 minutes and you have not reached your doctor:    Take your rescue medicine again and    Call 911 or go to the emergency room right  away    See your regular doctor within 2 weeks of an Emergency Room or Urgent Care visit for follow-up treatment.          Annual Reminders:  Meet with Asthma Educator,  Flu Shot in the Fall, consider Pneumonia Vaccination for patients with asthma (aged 19 and older).    Pharmacy:    [facility] [address] [place], [place] - [address] [facility] AT [place], [place] - [address] [facility] [address] [facility] [employer] - [place], [place] - [address] [facility] [place] - [address] [address]                      Asthma Triggers  How To Control Things That Make Your Asthma Worse    Triggers are things that make your asthma worse.  Look at the list below to help you find your triggers and what you can do about them.  You can help prevent asthma flare-ups by staying away from your triggers.      Trigger                                                          What you can do   Cigarette Smoke  Tobacco smoke can make asthma worse. Do not allow smoking in your home, car or around you.  Be sure no one smokes at a child s day care or school.  If you smoke, ask your health care provider for ways to help you quit.  Ask family members to quit too.  Ask your health care provider for a referral to Quit Plan to help you quit smoking, or call 9-703-282-PLAN.     Colds, Flu, Bronchitis  These are common triggers of asthma. Wash your hands often.  Don t touch your eyes, nose or mouth.  Get a flu shot every year.     Dust Mites  These are tiny bugs that live in cloth or carpet. They are too small to see. Wash sheets and blankets in hot water every week.   Encase pillows and mattress in dust mite proof covers.  Avoid having carpet if you can. If you have carpet, vacuum weekly.   Use a dust mask and HEPA vacuum.   Pollen and Outdoor Mold  Some people are allergic to trees, grass, or weed pollen, or molds. Try to keep your windows closed.  Limit time out doors when pollen count  is high.   Ask you health care provider about taking medicine during allergy season.     Animal Dander  Some people are allergic to skin flakes, urine or saliva from pets with fur or feathers. Keep pets with fur or feathers out of your home.    If you can t keep the pet outdoors, then keep the pet out of your bedroom.  Keep the bedroom door closed.  Keep pets off cloth furniture and away from stuffed toys.     Mice, Rats, and Cockroaches  Some people are allergic to the waste from these pests.   Cover food and garbage.  Clean up spills and food crumbs.  Store grease in the refrigerator.   Keep food out of the bedroom.   Indoor Mold  This can be a trigger if your home has high moisture. Fix leaking faucets, pipes, or other sources of water.   Clean moldy surfaces.  Dehumidify basement if it is damp and smelly.   Smoke, Strong Odors, and Sprays  These can reduce air quality. Stay away from strong odors and sprays, such as perfume, powder, hair spray, paints, smoke incense, paint, cleaning products, candles and new carpet.   Exercise or Sports  Some people with asthma have this trigger. Be active!  Ask your doctor about taking medicine before sports or exercise to prevent symptoms.    Warm up for 5-10 minutes before and after sports or exercise.     Other Triggers of Asthma  Cold air:  Cover your nose and mouth with a scarf.  Sometimes laughing or crying can be a trigger.  Some medicines and food can trigger asthma.

## 2018-10-01 NOTE — PROGRESS NOTES
SUBJECTIVE:   Alejandra Forbes is a 38 year old female who presents to clinic today for the following health issues:      Diabetes Follow-up    Patient is checking blood sugars: 5 times daily.  Or more  Blood sugar testing frequency justification: Patient modifying lifestyle changes (diet, exercise) with blood sugars  Results are as follows:         Varies throughout the day    Diabetic concerns: other - fasting am readings are higher-seems to be dehydrated, drinks a lot of water and readings come down     Symptoms of hypoglycemia (low blood sugar): none-     Paresthesias (numbness or burning in feet) or sores: No     Date of last diabetic eye exam: UTD-7-2018    BP Readings from Last 2 Encounters:   10/01/18 (!) 140/94   06/21/18 (!) 132/91     Hemoglobin A1C (%)   Date Value   10/01/2018 6.1 (H)   05/04/2018 6.5 (H)     LDL Cholesterol Calculated (mg/dL)   Date Value   05/09/2018 96   07/03/2008 106     LDL Cholesterol Direct (mg/dL)   Date Value   03/31/2011 103     Wt Readings from Last 5 Encounters:   10/01/18 225 lb (102.1 kg)   07/11/18 229 lb 6.4 oz (104.1 kg)   06/21/18 229 lb 0.9 oz (103.9 kg)   06/05/18 229 lb 0.9 oz (103.9 kg)   05/31/18 229 lb (103.9 kg)       Diabetes Management Resources    Amount of exercise or physical activity: 2-3 days/week for an average of 30-45 minutes    Problems taking medications regularly: occ. Diarrhea from Metformin, but not consistant and depends on what she has eaten    Medication side effects: see above    Diet: diabetic and carbohydrate counting        PROBLEMS TO ADD ON...hx of MS:  Relapsing remitting MS   Dr Sheffield- new MRI in June 2018, Currently restarted since June '2018 interferon 3 times a week.  Follow up MRI in dec 2018    History of mood disorder / ? Bipolar-  Reports no concerns with mental health  Manages on own- and with therapy as needed   Is able to contact the therapist via text and get in fairly quickly.  Did see the Psychiatrist seen in 2018, no  avaliable records  Dr Wilmer Khan (in private practice) - agreed that she does not need to take medications right now and will follow up as needed      Problem list and histories reviewed & adjusted, as indicated.  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     Hyperglycemia, drug-induced     Bipolar 2 disorder (HCC)     Anxiety attack     Morbid obesity (H)     Type 2 diabetes mellitus with hyperglycemia, without long-term current use of insulin (H)     Mild intermittent asthma without complication     Benign essential hypertension     Past Surgical History:   Procedure Laterality Date     C NONSPECIFIC PROCEDURE  4/06, 7/06    glaucoma surgery of both eyes due to steroid txt of pars planaris     CATARACT IOL, RT/LT Left 2006     GLAUCOMA SURGERY Bilateral 2006     YAG CAPSULOTOMY OS (LEFT EYE) Left 09/13       Social History   Substance Use Topics     Smoking status: Never Smoker     Smokeless tobacco: Never Used     Alcohol use No      Comment: 0-1 Drinks Per Week     Family History   Problem Relation Age of Onset     Diabetes Father      Severe Type 2     Alcohol/Drug Father      Allergies Father      Depression Father      Bipolar     Obesity Father      Blood Disease Father      passed away from septic shock in 6/07     Diabetes Paternal Grandfather      Type 2     Depression Paternal Grandfather      Eye Disorder Paternal Grandmother      Glaucoma, Cataracts     Osteoporosis Paternal  "Grandmother      Glaucoma Paternal Grandmother      Lipids Maternal Grandmother      Breast Cancer Mother 60     Allergies Brother      Cancer Paternal Aunt      lung, smoker     Macular Degeneration No family hx of            Reviewed and updated as needed this visit by clinical staff  Tobacco  Allergies  Meds  Med Hx  Surg Hx  Fam Hx  Soc Hx      Reviewed and updated as needed this visit by Provider  Meds         ROS:  Constitutional, HEENT, cardiovascular, pulmonary, gi and gu systems are negative, except as otherwise noted.    OBJECTIVE:     BP (!) 140/94  Pulse 73  Temp 98  F (36.7  C) (Oral)  Resp 14  Ht 5' 2\" (1.575 m)  Wt 225 lb (102.1 kg)  SpO2 100%  Breastfeeding? No  BMI 41.15 kg/m2  Body mass index is 41.15 kg/(m^2).  GENERAL: healthy, alert and no distress  NECK: no adenopathy, no asymmetry, masses, or scars and thyroid normal to palpation  RESP: lungs clear to auscultation - no rales, rhonchi or wheezes  CV: regular rate and rhythm, normal S1 S2, no S3 or S4, no murmur, click or rub, no peripheral edema and peripheral pulses strong  ABDOMEN: soft, nontender, no hepatosplenomegaly, no masses and bowel sounds normal  MS: no gross musculoskeletal defects noted, no edema  PSYCH: mentation appears normal, affect normal/bright    Diagnostic Test Results:    ASSESSMENT/PLAN:     1. Type 2 diabetes mellitus with hyperglycemia, without long-term current use of insulin (H)  Plan: well controlled  Commendable control with diet and compliant with medications  Refilled as below and recheck in 6 months, earlier if concerns   - metFORMIN (GLUCOPHAGE-XR) 500 MG 24 hr tablet; TAKE 2 TABLETS (1,000 MG) BY MOUTH 2 TIMES DAILY (WITH MEALS)  Dispense: 120 tablet; Refill: 3  - lisinopril (PRINIVIL/ZESTRIL) 2.5 MG tablet; Take 1 tablet (2.5 mg) by mouth daily  Dispense: 30 tablet; Refill: 3  - Hemoglobin A1c  - Basic metabolic panel    2. Benign essential hypertension- new diagnoses & uncontrolled   Plan: " checked BP twice remained high   Advised to start medications and recheck either as nurse only appointment or see provider in 2 weeks . Will need recheck of creatinine and potassium(futured).  She reports she is not entirely sure if want to start a new medications or not   - lisinopril (PRINIVIL/ZESTRIL) 2.5 MG tablet; Take 1 tablet (2.5 mg) by mouth daily  Dispense: 30 tablet; Refill: 3    3. Mild intermittent asthma without complication  Plan: history of excercise induced asthma in childhood  Recurring symptoms   - albuterol (PROAIR HFA/PROVENTIL HFA/VENTOLIN HFA) 108 (90 Base) MCG/ACT inhaler; Inhale 2 puffs into the lungs every 6 hours as needed for shortness of breath / dyspnea or wheezing  Dispense: 1 Inhaler; Refill: 11    4. Multiple sclerosis (H)  Plan: Under care of Dr Sheffield- new MRI in June 2018, Currently restarted since June '2018 interferon 3 times a week.  Follow up MRI in dec 2018      Need for vaccine  Flu and [id number]-23- given today    Shivani Clemons MD  [facility]

## 2018-10-01 NOTE — NURSING NOTE
"Chief Complaint   Patient presents with     Diabetes     follow up       Initial BP (!) 133/94  Pulse 73  Temp 98  F (36.7  C) (Oral)  Resp 14  Ht 5' 2\" (1.575 m)  Wt 225 lb (102.1 kg)  SpO2 100%  Breastfeeding? No  BMI 41.15 kg/m2 Estimated body mass index is 41.15 kg/(m^2) as calculated from the following:    Height as of this encounter: 5' 2\" (1.575 m).    Weight as of this encounter: 225 lb (102.1 kg).  BP completed using cuff size: large    Health Maintenance that is potentially due pending provider review:  Health Maintenance Due   Topic Date Due     PNEUMOVAX 1X HI RISK PATIENT < 65 (NO IB MSG)  02/14/1980     INFLUENZA VACCINE (1) 09/01/2018         Flu vaccine today  "

## 2018-10-02 VITALS
WEIGHT: 225 LBS | BODY MASS INDEX: 41.41 KG/M2 | SYSTOLIC BLOOD PRESSURE: 140 MMHG | DIASTOLIC BLOOD PRESSURE: 94 MMHG | HEIGHT: 62 IN | HEART RATE: 73 BPM | RESPIRATION RATE: 14 BRPM | OXYGEN SATURATION: 100 % | TEMPERATURE: 98 F

## 2018-10-02 ASSESSMENT — ASTHMA QUESTIONNAIRES: ACT_TOTALSCORE: 25

## 2018-10-02 NOTE — PROGRESS NOTES
Pleasure seeing you in clinic  -Kidney function is normal (Cr, GFR), Sodium is normal, Potassium is normal, Calcium is normal, Glucose is  125 and that's acceptable range  -A1C (test of diabetes control the last 2-3 months) is at your goal. Please continue with current plan. Also, see me and recheck your A1C test in 6 months. ;

## 2018-10-28 DIAGNOSIS — E11.65 TYPE 2 DIABETES MELLITUS WITH HYPERGLYCEMIA, WITHOUT LONG-TERM CURRENT USE OF INSULIN (H): ICD-10-CM

## 2018-10-29 RX ORDER — METFORMIN HCL 500 MG
TABLET, EXTENDED RELEASE 24 HR ORAL
Start: 2018-10-29

## 2018-10-29 NOTE — TELEPHONE ENCOUNTER
"Requested Prescriptions   Pending Prescriptions Disp Refills     metFORMIN (GLUCOPHAGE-XR) 500 MG 24 hr tablet [Pharmacy Med Name: METFORMIN  MG TABLET] 120 tablet 0     Sig: TAKE 2 TABLETS (1,000 MG) BY MOUTH 2 TIMES DAILY (WITH MEALS)--MAKE APPT    Biguanide Agents Failed    10/28/2018  1:52 AM       Failed - Blood pressure less than 140/90 in past 6 months    BP Readings from Last 3 Encounters:   10/01/18 (!) 140/94   06/21/18 (!) 132/91   06/05/18 (!) 127/94                Passed - Patient is age 10 or older       Passed - Recent (12 mo) or future (30 days) visit within the authorizing provider's specialty     Patient had office visit in the last 12 months or has a visit in the next 30 days with authorizing provider or within the authorizing provider's specialty.  See \"Patient Info\" tab in inbasket, or \"Choose Columns\" in Meds & Orders section of the refill encounter.             Passed - Patient does NOT have a diagnosis of CHF.       Passed - Patient is not pregnant       Passed - Patient has not had a positive pregnancy test within the past 12 mos.         "

## 2018-11-10 DIAGNOSIS — E11.65 TYPE 2 DIABETES MELLITUS WITH HYPERGLYCEMIA, WITHOUT LONG-TERM CURRENT USE OF INSULIN (H): ICD-10-CM

## 2018-11-12 RX ORDER — BLOOD SUGAR DIAGNOSTIC
STRIP MISCELLANEOUS
Qty: 500 STRIP | Refills: 1 | Status: SHIPPED | OUTPATIENT
Start: 2018-11-12 | End: 2022-03-07

## 2018-11-12 NOTE — TELEPHONE ENCOUNTER
"Prescription approved per [facility] [place]ill [place].  Cherelle ADAN RN    Requested Prescriptions   Pending Prescriptions Disp Refills     ONETOUCH VERIO IQ test strip [Pharmacy Med Name: ONE TOUCH VERIO TEST STRIP] 500 strip 0     Sig: USE TO TEST BLOOD SUGARS FIVE TIMES DAILY OR AS DIRECTED    Diabetic Supplies Protocol Passed    11/10/2018  2:39 PM       Passed - Patient is 18 years of age or older       Passed - Recent (6 mo) or future (30 days) visit within the authorizing provider's specialty    Patient had office visit in the last 6 months or has a visit in the next 30 days with authorizing provider.  See \"Patient Info\" tab in inbasket, or \"Choose Columns\" in Meds & Orders section of the refill encounter.                "

## 2018-12-03 ENCOUNTER — TELEPHONE (OUTPATIENT)
Dept: FAMILY MEDICINE | Facility: CLINIC | Age: 38
End: 2018-12-03

## 2018-12-03 NOTE — TELEPHONE ENCOUNTER
Reason for Call:  Medication or medication refill:      Do you use a [facility]?  Name of the pharmacy and phone number for the current request:         [facility] [id number] [facility] - [place], [place] - [address]    Name of the medication requested: albuterol (PROAIR HFA/PROVENTIL HFA/VENTOLIN HFA) 108 (90 Base) MCG/ACT inhaler    Other request: patient said pharmacy should be calling to request a spacer for this medication.    Can we leave a detailed message on this number? YES    Phone number patient can be reached at: Home number on file [phone] (home)    Best Time: anytime    Call taken on 12/3/2018 at 3:54 PM by Kirti Vora  .

## 2018-12-08 ENCOUNTER — OFFICE VISIT (OUTPATIENT)
Dept: URGENT CARE | Facility: URGENT CARE | Age: 38
End: 2018-12-08
Payer: COMMERCIAL

## 2018-12-08 VITALS
BODY MASS INDEX: 41.41 KG/M2 | SYSTOLIC BLOOD PRESSURE: 132 MMHG | WEIGHT: 225 LBS | OXYGEN SATURATION: 99 % | DIASTOLIC BLOOD PRESSURE: 80 MMHG | TEMPERATURE: 97.8 F | HEIGHT: 62 IN | HEART RATE: 95 BPM

## 2018-12-08 DIAGNOSIS — J98.01 ACUTE BRONCHOSPASM: ICD-10-CM

## 2018-12-08 DIAGNOSIS — J06.9 VIRAL UPPER RESPIRATORY TRACT INFECTION: Primary | ICD-10-CM

## 2018-12-08 PROCEDURE — 99214 OFFICE O/P EST MOD 30 MIN: CPT | Performed by: PHYSICIAN ASSISTANT

## 2018-12-08 RX ORDER — PREDNISONE 20 MG/1
20 TABLET ORAL DAILY
Qty: 3 TABLET | Refills: 0 | Status: SHIPPED | OUTPATIENT
Start: 2018-12-08 | End: 2018-12-11

## 2018-12-08 RX ORDER — CODEINE PHOSPHATE AND GUAIFENESIN 10; 100 MG/5ML; MG/5ML
1 SOLUTION ORAL EVERY 4 HOURS PRN
Qty: 120 ML | Refills: 0 | Status: SHIPPED | OUTPATIENT
Start: 2018-12-08 | End: 2019-07-18

## 2018-12-08 RX ORDER — ALBUTEROL SULFATE 0.83 MG/ML
SOLUTION RESPIRATORY (INHALATION)
Qty: 1 VIAL | Refills: 0
Start: 2018-12-08 | End: 2019-07-18

## 2018-12-08 NOTE — MR AVS SNAPSHOT
"              After Visit Summary   12/8/2018 April FRANCIS Forbes    MRN: [id number]           Patient Information     Date Of Birth          1980        Visit Information        Provider Department      12/8/2018 9:40 AM See Rees PA-C [facility]        Today's Diagnoses     Viral upper respiratory tract infection    -  1    Acute bronchospasm           Follow-ups after your visit        Who to contact     If you have questions or need follow up information about today's clinic visit or your schedule please contact [facility] directly at [phone].  Normal or non-critical lab and imaging results will be communicated to you by Yabbedoohart, letter or phone within 4 business days after the clinic has received the results. If you do not hear from us within 7 days, please contact the clinic through SingleFeedt or phone. If you have a critical or abnormal lab result, we will notify you by phone as soon as possible.  Submit refill requests through 3Nod or call your pharmacy and they will forward the refill request to us. Please allow 3 business days for your refill to be completed.          Additional Information About Your Visit        MyChart Information     3Nod gives you secure access to your electronic health record. If you see a primary care provider, you can also send messages to your care team and make appointments. If you have questions, please call your primary care clinic.  If you do not have a primary care provider, please call [phone] and they will assist you.        Care EveryWhere ID     This is your Care EveryWhere ID. This could be used by other organizations to access your [place] medical records  [id number]        Your Vitals Were     Pulse Temperature Height Last Period Pulse Oximetry Breastfeeding?    95 97.8  F (36.6  C) (Tympanic) 5' 2\" (1.575 m) 12/01/2018 99% No    BMI (Body Mass Index)                   41.15 kg/m2            " Blood Pressure from Last 3 Encounters:   12/08/18 132/80   10/01/18 (!) 140/94   06/21/18 (!) 132/91    Weight from Last 3 Encounters:   12/08/18 225 lb (102.1 kg)   10/01/18 225 lb (102.1 kg)   07/11/18 229 lb 6.4 oz (104.1 kg)              Today, you had the following     No orders found for display         Today's Medication Changes          These changes are accurate as of 12/8/18 10:25 AM.  If you have any questions, ask your nurse or doctor.               Start taking these medicines.        Dose/Directions    guaiFENesin-codeine 100-10 MG/5ML solution   Commonly known as:  ROBITUSSIN AC   Used for:  Viral upper respiratory tract infection   Started by:  See Rees PA-C        Dose:  1 tsp.   Take 5 mLs by mouth every 4 hours as needed for cough   Quantity:  120 mL   Refills:  0       predniSONE 20 MG tablet   Commonly known as:  DELTASONE   Used for:  Acute bronchospasm, Viral upper respiratory tract infection   Started by:  See Rees PA-C        Dose:  20 mg   Take 20 mg by mouth daily for 3 days.   Quantity:  3 tablet   Refills:  0         These medicines have changed or have updated prescriptions.        Dose/Directions    * albuterol 108 (90 Base) MCG/ACT inhaler   Commonly known as:  PROAIR HFA/PROVENTIL HFA/VENTOLIN HFA   This may have changed:  Another medication with the same name was added. Make sure you understand how and when to take each.   Used for:  Mild intermittent asthma without complication   Changed by:  See Rees PA-C        Dose:  2 puff   Inhale 2 puffs into the lungs every 6 hours as needed for shortness of breath / dyspnea or wheezing   Quantity:  1 Inhaler   Refills:  11       * albuterol (2.5 MG/3ML) 0.083% neb solution   Commonly known as:  PROVENTIL   This may have changed:  You were already taking a medication with the same name, and this prescription was added. Make sure you understand how and when to take each.   Used for:  Viral upper respiratory tract  infection, Acute bronchospasm   Changed by:  See Rese PA-C        One now   Quantity:  1 vial   Refills:  0       * Notice:  This list has 2 medication(s) that are the same as other medications prescribed for you. Read the directions carefully, and ask your doctor or other care provider to review them with you.         Where to get your medicines      These medications were sent to [facility] [id number] [facility] - [place], [place] - [address] [facility]  [address], [place] [address]     Phone:  [phone]     predniSONE 20 MG tablet         Some of these will need a paper prescription and others can be bought over the counter.  Ask your nurse if you have questions.     Bring a paper prescription for each of these medications     guaiFENesin-codeine 100-10 MG/5ML solution       You don't need a prescription for these medications     albuterol (2.5 MG/3ML) 0.083% neb solution                Primary Care Provider Office Phone # Fax #    Shivani Clemons [phone] [address] [facility] [address]        Equal Access to Services     [facility]: Hadii deshawn payne hadasho Somark, waaxda luqadaha, qaybta kaalmada katy, nilo ramos . So [facility] [phone].    ATENCIÓN: Si habla español, tiene a murphy disposición servicios gratuitos de asistencia lingüística. Homa al [phone].    We comply with applicable federal civil rights laws and [place] laws. We do not discriminate on the basis of race, color, national origin, age, disability, sex, sexual orientation, or gender identity.            Thank you!     Thank you for choosing [facility]  for your care. Our goal is always to provide you with excellent care. Hearing back from our patients is one way we can continue to improve our services. Please take a few minutes to complete the written survey that you may receive in the mail after your visit with us. Thank you!              Your Updated Medication List - Protect others around you: Learn how to safely use, store and throw away your medicines at www.disposemymeds.org.          This list is accurate as of 12/8/18 10:25 AM.  Always use your most recent med list.                   Brand Name Dispense Instructions for use Diagnosis    * albuterol 108 (90 Base) MCG/ACT inhaler    PROAIR HFA/PROVENTIL HFA/VENTOLIN HFA    1 Inhaler    Inhale 2 puffs into the lungs every 6 hours as needed for shortness of breath / dyspnea or wheezing    Mild intermittent asthma without complication       * albuterol (2.5 MG/3ML) 0.083% neb solution    PROVENTIL    1 vial    One now    Viral upper respiratory tract infection, Acute bronchospasm       blood glucose monitoring lancets     500 each    Use to test blood sugars five times daily or as directed.    Type 2 diabetes mellitus with hyperglycemia, without long-term current use of insulin (H)       Blood Glucose Monitoring Suppl Gauri     1 each    1 Device as needed One Touch Verio Flex glucose monitor    Diabetes mellitus (H)       guaiFENesin-codeine 100-10 MG/5ML solution    ROBITUSSIN AC    120 mL    Take 5 mLs by mouth every 4 hours as needed for cough    Viral upper respiratory tract infection       ibuprofen 200 MG tablet    ADVIL/MOTRIN     Take 2-3 tablets by mouth as needed        Interferon Beta-1a 44 MCG/0.5ML Soaj    REBIF REBIDOSE    12 Syringe    Inject 44 mcg Subcutaneous three times a week    Multiple sclerosis (H)       lisinopril 2.5 MG tablet    PRINIVIL/Zestril    30 tablet    Take 1 tablet (2.5 mg) by mouth daily    Benign essential hypertension, Type 2 diabetes mellitus with hyperglycemia, without long-term current use of insulin (H)       metFORMIN 500 MG 24 hr tablet    GLUCOPHAGE-XR    120 tablet    TAKE 2 TABLETS (1,000 MG) BY MOUTH 2 TIMES DAILY (WITH MEALS)    Type 2 diabetes mellitus with hyperglycemia, without long-term current use of insulin (H)       norgestrel-ethinyl  estradiol 0.3-30 MG-MCG tablet    LO/OVRAL    90 tablet    Take 1 tablet by mouth daily    Family planning, BCP (birth control pills) maintenance       ONETOUCH VERIO IQ test strip   Generic drug:  blood glucose monitoring     500 strip    USE TO TEST BLOOD SUGARS FIVE TIMES DAILY OR AS DIRECTED    Type 2 diabetes mellitus with hyperglycemia, without long-term current use of insulin (H)       order for DME     1 Device    Equipment being ordered: nebulizer with tubing    Wheezing       prednisoLONE acetate 1 % ophthalmic suspension    PRED FORTE    1 Bottle    Place 1 drop Into the left eye 3 times daily    Cystoid macular edema of both eyes       predniSONE 20 MG tablet    DELTASONE    3 tablet    Take 20 mg by mouth daily for 3 days.    Acute bronchospasm, Viral upper respiratory tract infection       vitamin D3 1000 units (25 mcg) tablet    CHOLECALCIFEROL    90 tablet    Take one tablet daily, starting after you have finished the once a week dosing of the 50,000 Units dose.    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 Read the directions carefully, and ask your doctor or other care provider to review them with you.

## 2018-12-08 NOTE — PROGRESS NOTES
SUBJECTIVE:   Alejandra Forbes is a 38 year old female presenting with a chief complaint of cough - productive, ear pain bilateral and sore throat.  Onset of symptoms was 1 week(s) ago.  Course of illness is same.    Severity mild  Current and Associated symptoms: wheezing, shortness of breath and no chest tightness  Treatment measures tried include Inhaler (name: albuterol 2x daily for the past week).  Predisposing factors include patient feeling she has RAD but no diagnosis. Medical chart says intermittent asthma. No asthma as a child and no similar symptoms since about 3 years ago. Wants a cough medication at night. History of diabetes.     Past Medical History:   Diagnosis Date     Allergic rhinitis, cause unspecified     no meds taken, rare flonase     Anxiety attack 8/28/2015     Asthma, exercise induced 3/1/2013     Diabetes (H)      Intermittent asthma 3/1/2013    UPPER RESPIRATIRY TRACT INFECTION induced      Mild intermittent asthma     Exercise induced, & cold -better w/ weight loss, albuterol use rare     Pars planitis     steroid inj txt gave glaucoma, surgery to treat gave too low pressures and optive nerve swelling- needs more surgery     Polycystic ovaries 2003    Better on OCPs and metformin for insulin resistance, last a1c 5.7     Posterior subcapsular polar cataract, nonsenile     steroid induced.     Steroid responders      Uveitis      Current Outpatient Prescriptions   Medication Sig Dispense Refill     albuterol (PROAIR HFA/PROVENTIL HFA/VENTOLIN HFA) 108 (90 Base) MCG/ACT inhaler Inhale 2 puffs into the lungs every 6 hours as needed for shortness of breath / dyspnea or wheezing 1 Inhaler 11     blood glucose monitoring (ONE TOUCH DELICA) lancets Use to test blood sugars five times daily or as directed. 500 each 0     Blood Glucose Monitoring Suppl MESFIN 1 Device as needed One Touch Verio Flex glucose monitor 1 each 0     cholecalciferol (VITAMIN D) 1000 UNIT tablet Take one tablet daily,  "starting after you have finished the once a week dosing of the 50,000 Units dose. 90 tablet 4     ibuprofen (ADVIL,MOTRIN) 200 MG tablet Take 2-3 tablets by mouth as needed       Interferon Beta-1a (REBIF REBIDOSE) 44 MCG/0.5ML SOAJ Inject 44 mcg Subcutaneous three times a week 12 Syringe 11     lisinopril (PRINIVIL/ZESTRIL) 2.5 MG tablet Take 1 tablet (2.5 mg) by mouth daily 30 tablet 3     metFORMIN (GLUCOPHAGE-XR) 500 MG 24 hr tablet TAKE 2 TABLETS (1,000 MG) BY MOUTH 2 TIMES DAILY (WITH MEALS) 120 tablet 3     norgestrel-ethinyl estradiol (LO/OVRAL) 0.3-30 MG-MCG per tablet Take 1 tablet by mouth daily 90 tablet 4     ONETOUCH VERIO IQ test strip USE TO TEST BLOOD SUGARS FIVE TIMES DAILY OR AS DIRECTED 500 strip 1     ORDER FOR DME Equipment being ordered: nebulizer with tubing 1 Device 0     prednisoLONE acetate (PRED FORTE) 1 % ophthalmic susp Place 1 drop Into the left eye 3 times daily 1 Bottle 3     zolpidem (AMBIEN) 5 MG tablet Take 1 tablet (5 mg) by mouth nightly as needed for sleep 30 tablet 0     Social History   Substance Use Topics     Smoking status: Never Smoker     Smokeless tobacco: Never Used     Alcohol use No      Comment: 0-1 Drinks Per Week       ROS:  CONSTITUTIONAL:NEGATIVE for fever, chills, change in weight  EYES: NEGATIVE for vision changes or irritation  ENT/MOUTH: NEGATIVE for ear, mouth and throat problems  RESP:cough-productive and wheezing    OBJECTIVE:  /80  Pulse 95  Temp 97.8  F (36.6  C) (Tympanic)  Ht 5' 2\" (1.575 m)  Wt 225 lb (102.1 kg)  LMP 12/01/2018  SpO2 99%  Breastfeeding? No  BMI 41.15 kg/m2  GENERAL APPEARANCE: healthy, alert and no distress  EYES: EOMI,  PERRL, conjunctiva clear  HENT: ear canals and TM's normal.  Nose and mouth without ulcers, erythema or lesions  NECK: supple, nontender, no lymphadenopathy  RESP: lungs clear to auscultation - no rales, rhonchi or wheezes  CV: regular rates and rhythm, normal S1 S2, no murmur noted  ABDOMEN:  soft, " nontender, no HSM or masses and bowel sounds normal  NEURO: Normal strength and tone, sensory exam grossly normal,  normal speech and mentation  SKIN: no suspicious lesions or rashes    ASSESSMENT:    1. Viral upper respiratory tract infection    - guaiFENesin-codeine (ROBITUSSIN AC) 100-10 MG/5ML solution; Take 5 mLs by mouth every 4 hours as needed for cough  Dispense: 120 mL; Refill: 0  - albuterol (PROVENTIL) (2.5 MG/3ML) 0.083% neb solution; One now  Dispense: 1 vial; Refill: 0  - predniSONE (DELTASONE) 20 MG tablet; Take 20 mg by mouth daily for 3 days.  Dispense: 3 tablet; Refill: 0    2. Acute bronchospasm    - albuterol (PROVENTIL) (2.5 MG/3ML) 0.083% neb solution; One now  Dispense: 1 vial; Refill: 0  - predniSONE (DELTASONE) 20 MG tablet; Take 20 mg by mouth daily for 3 days.  Dispense: 3 tablet; Refill: 0    PLAN:  Trial of prednisone and albuterol 2 puffs 3-4x daily for 3 days. Follow up with primary clinic if not improving over the next week.   See orders in Epic

## 2019-02-05 DIAGNOSIS — E11.65 TYPE 2 DIABETES MELLITUS WITH HYPERGLYCEMIA, WITHOUT LONG-TERM CURRENT USE OF INSULIN (H): ICD-10-CM

## 2019-02-05 DIAGNOSIS — I10 BENIGN ESSENTIAL HYPERTENSION: ICD-10-CM

## 2019-02-05 RX ORDER — LISINOPRIL 2.5 MG/1
TABLET ORAL
Qty: 30 TABLET | Refills: 2 | Status: SHIPPED | OUTPATIENT
Start: 2019-02-05 | End: 2019-04-02

## 2019-02-05 NOTE — TELEPHONE ENCOUNTER
"Prescription approved per [facility] Refill Protocol.  Due for diabetes follow up in April.  Candace Wong RN    Requested Prescriptions   Signed Prescriptions Disp Refills     lisinopril (PRINIVIL/ZESTRIL) 2.5 MG tablet 30 tablet 2     Sig: TAKE 1 TABLET BY MOUTH EVERY DAY    ACE Inhibitors (Including Combos) Protocol Passed - 2/5/2019  1:18 AM       Passed - Blood pressure under 140/90 in past 12 months    BP Readings from Last 3 Encounters:   12/08/18 132/80   10/01/18 (!) 140/94   06/21/18 (!) 132/91                Passed - Recent (12 mo) or future (30 days) visit within the authorizing provider's specialty    Patient had office visit in the last 12 months or has a visit in the next 30 days with authorizing provider or within the authorizing provider's specialty.  See \"Patient Info\" tab in inbasket, or \"Choose Columns\" in Meds & Orders section of the refill encounter.             Passed - Medication is active on med list       Passed - Patient is age 18 or older       Passed - No active pregnancy on record       Passed - Normal serum creatinine on file in past 12 months    Recent Labs   Lab Test 10/01/18  1028   CR 0.60            Passed - Normal serum potassium on file in past 12 months    Recent Labs   Lab Test 10/01/18  1028   POTASSIUM 4.1            Passed - No positive pregnancy test within past 12 months          "

## 2019-03-08 DIAGNOSIS — E11.65 TYPE 2 DIABETES MELLITUS WITH HYPERGLYCEMIA, WITHOUT LONG-TERM CURRENT USE OF INSULIN (H): ICD-10-CM

## 2019-03-08 RX ORDER — METFORMIN HCL 500 MG
TABLET, EXTENDED RELEASE 24 HR ORAL
Qty: 120 TABLET | Refills: 0 | Status: SHIPPED | OUTPATIENT
Start: 2019-03-08 | End: 2019-04-06

## 2019-03-08 NOTE — TELEPHONE ENCOUNTER
"Medication is being filled for 1 time refill only due to:  Patient needs to be seen because due for 6 month diabetes follow up. Last seen 10/1/18.   Candace Wong RN    Requested Prescriptions   Signed Prescriptions Disp Refills     metFORMIN (GLUCOPHAGE-XR) 500 MG 24 hr tablet 120 tablet 0     Sig: TAKE 2 TABLETS (1,000 MG) BY MOUTH 2 TIMES DAILY (WITH MEALS)    Biguanide Agents Passed - 3/8/2019  1:31 AM       Passed - Blood pressure less than 140/90 in past 6 months    BP Readings from Last 3 Encounters:   12/08/18 132/80   10/01/18 (!) 140/94   06/21/18 (!) 132/91                Passed - Patient is age 10 or older       Passed - Recent (12 mo) or future (30 days) visit within the authorizing provider's specialty     Patient had office visit in the last 12 months or has a visit in the next 30 days with authorizing provider or within the authorizing provider's specialty.  See \"Patient Info\" tab in inbasket, or \"Choose Columns\" in Meds & Orders section of the refill encounter.             Passed - Patient does NOT have a diagnosis of CHF.       Passed - Medication is active on med list       Passed - Patient is not pregnant       Passed - Patient has not had a positive pregnancy test within the past 12 mos.             "

## 2019-03-13 ENCOUNTER — TELEPHONE (OUTPATIENT)
Dept: FAMILY MEDICINE | Facility: CLINIC | Age: 39
End: 2019-03-13

## 2019-03-13 DIAGNOSIS — E11.65 TYPE 2 DIABETES MELLITUS WITH HYPERGLYCEMIA, WITHOUT LONG-TERM CURRENT USE OF INSULIN (H): Primary | ICD-10-CM

## 2019-03-13 NOTE — TELEPHONE ENCOUNTER
Message to prescriber: Alternative requested: Patient's insurance no longer covers One Touch. Please send new rx for Accu-Chek meter, test strips and lancets to [facility] ph# [phone].

## 2019-03-13 NOTE — TELEPHONE ENCOUNTER
Prescription approved per [facility] Refill Protocol.  Cherelle ADAN RN

## 2019-04-02 DIAGNOSIS — I10 BENIGN ESSENTIAL HYPERTENSION: ICD-10-CM

## 2019-04-02 DIAGNOSIS — E11.65 TYPE 2 DIABETES MELLITUS WITH HYPERGLYCEMIA, WITHOUT LONG-TERM CURRENT USE OF INSULIN (H): ICD-10-CM

## 2019-04-03 RX ORDER — LISINOPRIL 2.5 MG/1
TABLET ORAL
Qty: 30 TABLET | Refills: 0 | Status: SHIPPED | OUTPATIENT
Start: 2019-04-03 | End: 2019-05-07

## 2019-04-03 NOTE — TELEPHONE ENCOUNTER
"Medication is being filled for 1 time refill only due to:  Patient needs to be seen because due for diabetes f/u this month..   Candace Wong, MICHAEL    Requested Prescriptions   Signed Prescriptions Disp Refills     lisinopril (PRINIVIL/ZESTRIL) 2.5 MG tablet 30 tablet 0     Sig: TAKE 1 TABLET BY MOUTH EVERY DAY    ACE Inhibitors (Including Combos) Protocol Passed - 4/2/2019 11:33 AM       Passed - Blood pressure under 140/90 in past 12 months    BP Readings from Last 3 Encounters:   12/08/18 132/80   10/01/18 (!) 140/94   06/21/18 (!) 132/91                Passed - Recent (12 mo) or future (30 days) visit within the authorizing provider's specialty    Patient had office visit in the last 12 months or has a visit in the next 30 days with authorizing provider or within the authorizing provider's specialty.  See \"Patient Info\" tab in inbasket, or \"Choose Columns\" in Meds & Orders section of the refill encounter.             Passed - Medication is active on med list       Passed - Patient is age 18 or older       Passed - No active pregnancy on record       Passed - Normal serum creatinine on file in past 12 months    Recent Labs   Lab Test 10/01/18  1028   CR 0.60            Passed - Normal serum potassium on file in past 12 months    Recent Labs   Lab Test 10/01/18  1028   POTASSIUM 4.1            Passed - No positive pregnancy test within past 12 months            "

## 2019-04-06 DIAGNOSIS — E11.65 TYPE 2 DIABETES MELLITUS WITH HYPERGLYCEMIA, WITHOUT LONG-TERM CURRENT USE OF INSULIN (H): ICD-10-CM

## 2019-04-08 NOTE — TELEPHONE ENCOUNTER
"Patient given one month supply 3/8/19  Due for diabetes follow up.   Last 10/1/18    Avancert message sent to patient asking her to schedule appointment.  Candace Wong RN    Requested Prescriptions   Pending Prescriptions Disp Refills     metFORMIN (GLUCOPHAGE-XR) 500 MG 24 hr tablet [Pharmacy Med Name: METFORMIN HCL  MG TABLET] 120 tablet 0     Sig: TAKE 2 TABLETS (1,000 MG) BY MOUTH 2 TIMES DAILY (WITH MEALS)---MAKE APPT       Biguanide Agents Passed - 4/6/2019  8:39 AM        Passed - Blood pressure less than 140/90 in past 6 months     BP Readings from Last 3 Encounters:   12/08/18 132/80   10/01/18 (!) 140/94   06/21/18 (!) 132/91                 Passed - Patient is age 10 or older        Passed - Recent (12 mo) or future (30 days) visit within the authorizing provider's specialty      Patient had office visit in the last 12 months or has a visit in the next 30 days with authorizing provider or within the authorizing provider's specialty.  See \"Patient Info\" tab in inbasket, or \"Choose Columns\" in Meds & Orders section of the refill encounter.              Passed - Patient does NOT have a diagnosis of CHF.        Passed - Medication is active on med list        Passed - Patient is not pregnant        Passed - Patient has not had a positive pregnancy test within the past 12 mos.           "

## 2019-04-10 NOTE — TELEPHONE ENCOUNTER
Patient has not read Evolerot  Left non detailed VM for pt asking that they callback and schedule  Cherelle ADAN RN

## 2019-04-11 RX ORDER — METFORMIN HCL 500 MG
TABLET, EXTENDED RELEASE 24 HR ORAL
Qty: 120 TABLET | Refills: 0 | Status: SHIPPED | OUTPATIENT
Start: 2019-04-11 | End: 2019-06-18

## 2019-04-11 NOTE — TELEPHONE ENCOUNTER
A.S,   Tried to call pt for 2nd time today - VM now full  See below messages  No response from patient to our outreach  Please advise on further refill  Thanks,  Cherelle ADAN RN

## 2019-05-07 DIAGNOSIS — I10 BENIGN ESSENTIAL HYPERTENSION: ICD-10-CM

## 2019-05-07 DIAGNOSIS — E11.65 TYPE 2 DIABETES MELLITUS WITH HYPERGLYCEMIA, WITHOUT LONG-TERM CURRENT USE OF INSULIN (H): ICD-10-CM

## 2019-05-07 NOTE — TELEPHONE ENCOUNTER
"Due for physical   MyChart sent to pt  Cherelle ADAN RN    Last Written Prescription Date:  4/3/2019  Last Fill Quantity: 30,  # refills: 0   Last office visit: 10/1/2018 with prescribing provider:     Future Office Visit:    Requested Prescriptions   Pending Prescriptions Disp Refills     lisinopril (PRINIVIL/ZESTRIL) 2.5 MG tablet [Pharmacy Med Name: LISINOPRIL 2.5 MG TABLET] 30 tablet 0     Sig: TAKE 1 TABLET BY MOUTH EVERY DAY---NEED DIABETES FOLLOW UP       ACE Inhibitors (Including Combos) Protocol Passed - 5/7/2019 10:05 AM        Passed - Blood pressure under 140/90 in past 12 months     BP Readings from Last 3 Encounters:   12/08/18 132/80   10/01/18 (!) 140/94   06/21/18 (!) 132/91                 Passed - Recent (12 mo) or future (30 days) visit within the authorizing provider's specialty     Patient had office visit in the last 12 months or has a visit in the next 30 days with authorizing provider or within the authorizing provider's specialty.  See \"Patient Info\" tab in inbasket, or \"Choose Columns\" in Meds & Orders section of the refill encounter.              Passed - Medication is active on med list        Passed - Patient is age 18 or older        Passed - No active pregnancy on record        Passed - Normal serum creatinine on file in past 12 months     Recent Labs   Lab Test 10/01/18  1028   CR 0.60             Passed - Normal serum potassium on file in past 12 months     Recent Labs   Lab Test 10/01/18  1028   POTASSIUM 4.1             Passed - No positive pregnancy test within past 12 months          "

## 2019-05-09 DIAGNOSIS — E11.65 TYPE 2 DIABETES MELLITUS WITH HYPERGLYCEMIA, WITHOUT LONG-TERM CURRENT USE OF INSULIN (H): ICD-10-CM

## 2019-05-09 RX ORDER — METFORMIN HCL 500 MG
TABLET, EXTENDED RELEASE 24 HR ORAL
Start: 2019-05-09

## 2019-05-09 NOTE — TELEPHONE ENCOUNTER
Received 2nd refill request.  Informed pharmacy patient due for physical/diabetes f/u.    Patient did not read [facility] message.  VM left asking patient to call clinic.    Candace Wong RN

## 2019-05-14 RX ORDER — LISINOPRIL 2.5 MG/1
TABLET ORAL
Qty: 30 TABLET | Refills: 0 | Status: SHIPPED | OUTPATIENT
Start: 2019-05-14 | End: 2019-07-18

## 2019-05-14 NOTE — TELEPHONE ENCOUNTER
A.S,  Tried to call pt again - VM now full  See below messages  No response from patient to our outreach  Please advise on further refill  Thanks,  Cherelle ADAN RN

## 2019-05-14 NOTE — TELEPHONE ENCOUNTER
BP Readings from Last 3 Encounters:   12/08/18 132/80   10/01/18 (!) 140/94   06/21/18 (!) 132/91     Lab Results   Component Value Date    A1C 6.1 10/01/2018    A1C 6.5 05/04/2018    A1C 7.1 01/15/2018    A1C 7.3 06/08/2015    A1C 5.6 03/31/2011

## 2019-06-18 ENCOUNTER — TELEPHONE (OUTPATIENT)
Dept: FAMILY MEDICINE | Facility: CLINIC | Age: 39
End: 2019-06-18

## 2019-06-18 DIAGNOSIS — E11.65 TYPE 2 DIABETES MELLITUS WITH HYPERGLYCEMIA, WITHOUT LONG-TERM CURRENT USE OF INSULIN (H): ICD-10-CM

## 2019-06-18 RX ORDER — METFORMIN HCL 500 MG
TABLET, EXTENDED RELEASE 24 HR ORAL
Qty: 120 TABLET | Refills: 0 | Status: SHIPPED | OUTPATIENT
Start: 2019-06-18 | End: 2019-07-18

## 2019-06-18 NOTE — TELEPHONE ENCOUNTER
Reason for Call:  Medication or medication refill:    Do you use a [facility]?  Name of the pharmacy and phone number for the current request:  [id number] [place], [place] - [address]    Name of the medication requested: Metformin    Other request: Patient is needing a refill on her Metformin. She has an appointment scheduled next month, wondering if she can get another month's worth until she can be seen. Please advise of outcome. Thank you.    Can we leave a detailed message on this number? YES    Phone number patient can be reached at: Home number on file [phone] (home)    Best Time: Anytime.    Call taken on 6/18/2019 at 4:34 PM by Alberto Craig

## 2019-06-18 NOTE — TELEPHONE ENCOUNTER
Next 5 appointments (look out 90 days)    Jul 18, 2019  7:40 AM CDT  Office Visit with Shivani Clemons MD  [facility] ([facility]) [address] [address]  [phone]        Prescription approved per [facility] Refill Protocol.  Pt informed Rx sent  Cherelle ADAN RN

## 2019-07-15 DIAGNOSIS — E11.65 TYPE 2 DIABETES MELLITUS WITH HYPERGLYCEMIA, WITHOUT LONG-TERM CURRENT USE OF INSULIN (H): ICD-10-CM

## 2019-07-15 RX ORDER — METFORMIN HCL 500 MG
TABLET, EXTENDED RELEASE 24 HR ORAL
Start: 2019-07-15

## 2019-07-15 NOTE — TELEPHONE ENCOUNTER
"METFORMIN HCL  MG TABLET  Last Written Prescription Date:  06/18/2019  Last Fill Quantity: 120,  # refills: 0   Last office visit: 10/1/2018 with prescribing provider:  AS   Future Office Visit:  07/18/2019 WITH AS  Next 5 appointments (look out 90 days)    Jul 18, 2019  7:40 AM CDT  Office Visit with Shivani Clemons MD  [facility] ([facility]) [address] [address] [phone]         Requested Prescriptions   Pending Prescriptions Disp Refills     metFORMIN (GLUCOPHAGE-XR) 500 MG 24 hr tablet [Pharmacy Med Name: METFORMIN HCL  MG TABLET] 120 tablet 0     Sig: TAKE 2 TABLETS (1,000 MG) BY MOUTH 2 TIMES DAILY (WITH MEALS)---MAKE APPT       Biguanide Agents Failed - 7/15/2019  1:16 AM        Failed - Blood pressure less than 140/90 in past 6 months     BP Readings from Last 3 Encounters:   12/08/18 132/80   10/01/18 (!) 140/94   06/21/18 (!) 132/91                 Passed - Patient is age 10 or older        Passed - Recent (12 mo) or future (30 days) visit within the authorizing provider's specialty      Patient had office visit in the last 12 months or has a visit in the next 30 days with authorizing provider or within the authorizing provider's specialty.  See \"Patient Info\" tab in inbasket, or \"Choose Columns\" in Meds & Orders section of the refill encounter.              Passed - Patient does NOT have a diagnosis of CHF.        Passed - Medication is active on med list        Passed - Patient is not pregnant        Passed - Patient has not had a positive pregnancy test within the past 12 mos.         "

## 2019-07-15 NOTE — TELEPHONE ENCOUNTER
Overdue for diabetes follow up.  One month supply sent 6/18/19  Will be addressed at 7/18 appointment.  Candace Wong RN

## 2019-07-18 ENCOUNTER — OFFICE VISIT (OUTPATIENT)
Dept: FAMILY MEDICINE | Facility: CLINIC | Age: 39
End: 2019-07-18
Payer: COMMERCIAL

## 2019-07-18 VITALS
RESPIRATION RATE: 20 BRPM | HEART RATE: 65 BPM | TEMPERATURE: 97.9 F | DIASTOLIC BLOOD PRESSURE: 79 MMHG | HEIGHT: 61 IN | SYSTOLIC BLOOD PRESSURE: 122 MMHG | BODY MASS INDEX: 43.05 KG/M2 | WEIGHT: 228 LBS | OXYGEN SATURATION: 100 %

## 2019-07-18 DIAGNOSIS — E66.01 MORBID OBESITY (H): ICD-10-CM

## 2019-07-18 DIAGNOSIS — I10 BENIGN ESSENTIAL HYPERTENSION: ICD-10-CM

## 2019-07-18 DIAGNOSIS — E11.65 TYPE 2 DIABETES MELLITUS WITH HYPERGLYCEMIA, WITHOUT LONG-TERM CURRENT USE OF INSULIN (H): Primary | ICD-10-CM

## 2019-07-18 DIAGNOSIS — F31.81 BIPOLAR 2 DISORDER (H): ICD-10-CM

## 2019-07-18 DIAGNOSIS — G35 MULTIPLE SCLEROSIS (H): ICD-10-CM

## 2019-07-18 DIAGNOSIS — J45.20 MILD INTERMITTENT ASTHMA WITHOUT COMPLICATION: ICD-10-CM

## 2019-07-18 DIAGNOSIS — E55.9 VITAMIN D INSUFFICIENCY: ICD-10-CM

## 2019-07-18 DIAGNOSIS — Z30.41 FAMILY PLANNING, BCP (BIRTH CONTROL PILLS) MAINTENANCE: ICD-10-CM

## 2019-07-18 LAB
ANION GAP SERPL CALCULATED.3IONS-SCNC: 7 MMOL/L (ref 3–14)
BUN SERPL-MCNC: 12 MG/DL (ref 7–30)
CALCIUM SERPL-MCNC: 8.8 MG/DL (ref 8.5–10.1)
CHLORIDE SERPL-SCNC: 110 MMOL/L (ref 94–109)
CHOLEST SERPL-MCNC: 166 MG/DL
CO2 SERPL-SCNC: 21 MMOL/L (ref 20–32)
CREAT SERPL-MCNC: 0.65 MG/DL (ref 0.52–1.04)
CREAT UR-MCNC: 36 MG/DL
DEPRECATED CALCIDIOL+CALCIFEROL SERPL-MC: 41 UG/L (ref 20–75)
GFR SERPL CREATININE-BSD FRML MDRD: >90 ML/MIN/{1.73_M2}
GLUCOSE SERPL-MCNC: 123 MG/DL (ref 70–99)
HBA1C MFR BLD: 6.2 % (ref 0–5.6)
HDLC SERPL-MCNC: 37 MG/DL
LDLC SERPL CALC-MCNC: 111 MG/DL
MICROALBUMIN UR-MCNC: <5 MG/L
MICROALBUMIN/CREAT UR: NORMAL MG/G CR (ref 0–25)
NONHDLC SERPL-MCNC: 129 MG/DL
POTASSIUM SERPL-SCNC: 4.1 MMOL/L (ref 3.4–5.3)
SODIUM SERPL-SCNC: 138 MMOL/L (ref 133–144)
TRIGL SERPL-MCNC: 92 MG/DL
TSH SERPL DL<=0.005 MIU/L-ACNC: 1.82 MU/L (ref 0.4–4)

## 2019-07-18 PROCEDURE — 99214 OFFICE O/P EST MOD 30 MIN: CPT | Performed by: FAMILY MEDICINE

## 2019-07-18 PROCEDURE — 36415 COLL VENOUS BLD VENIPUNCTURE: CPT | Performed by: FAMILY MEDICINE

## 2019-07-18 PROCEDURE — 80061 LIPID PANEL: CPT | Performed by: FAMILY MEDICINE

## 2019-07-18 PROCEDURE — 84443 ASSAY THYROID STIM HORMONE: CPT | Performed by: FAMILY MEDICINE

## 2019-07-18 PROCEDURE — 82043 UR ALBUMIN QUANTITATIVE: CPT | Performed by: FAMILY MEDICINE

## 2019-07-18 PROCEDURE — 83036 HEMOGLOBIN GLYCOSYLATED A1C: CPT | Performed by: FAMILY MEDICINE

## 2019-07-18 PROCEDURE — 99207 C FOOT EXAM  NO CHARGE: CPT | Performed by: FAMILY MEDICINE

## 2019-07-18 PROCEDURE — 80048 BASIC METABOLIC PNL TOTAL CA: CPT | Performed by: FAMILY MEDICINE

## 2019-07-18 PROCEDURE — 82306 VITAMIN D 25 HYDROXY: CPT | Performed by: FAMILY MEDICINE

## 2019-07-18 RX ORDER — ALBUTEROL SULFATE 90 UG/1
2 AEROSOL, METERED RESPIRATORY (INHALATION) EVERY 6 HOURS PRN
Qty: 1 INHALER | Refills: 11 | Status: SHIPPED | OUTPATIENT
Start: 2019-07-18 | End: 2020-09-01

## 2019-07-18 RX ORDER — METFORMIN HCL 500 MG
TABLET, EXTENDED RELEASE 24 HR ORAL
Qty: 120 TABLET | Refills: 11 | Status: SHIPPED | OUTPATIENT
Start: 2019-07-18 | End: 2020-06-16

## 2019-07-18 RX ORDER — LISINOPRIL 2.5 MG/1
2.5 TABLET ORAL DAILY
Qty: 90 TABLET | Refills: 3 | Status: SHIPPED | OUTPATIENT
Start: 2019-07-18 | End: 2020-09-01

## 2019-07-18 ASSESSMENT — MIFFLIN-ST. JEOR: SCORE: 1632.61

## 2019-07-18 NOTE — PROGRESS NOTES
Subjective     Alejandra Forbes is a 39 year old female who presents to clinic today for the following health issues:    HPI   Diabetes Follow-up      How often are you checking your blood sugar? Four or more times daily    What time of day are you checking your blood sugars (select all that apply)?  Before and after meals    Have you had any blood sugars above 200?  No    Have you had any blood sugars below 70?  No    What symptoms do you notice when your blood sugar is low?  None    What concerns do you have today about your diabetes? Morning sugars are a little higher than patient like to see      Do you have any of these symptoms? (Select all that apply)  No numbness or tingling in feet.  No redness, sores or blisters on feet.  No complaints of excessive thirst.  No reports of blurry vision.  No significant changes to weight.     Have you had a diabetic eye exam in the last 12 months? Yes- Date of last eye exam: 10/2018    New position at previous job, more responsibilities.  Stress impact general health.  Stopped interferon on own, about 6 months ago- because of flu like symptoms with bodyache & extreme fatigue as  Intolerable side effects .  Having some eye symptoms again- and has plans to see the ophthalmologist.  Has not had further follow up with neurology since 6 2018, willing to follow up after MRI.    BP Readings from Last 2 Encounters:   07/18/19 122/79   12/08/18 132/80     Hemoglobin A1C (%)   Date Value   10/01/2018 6.1 (H)   05/04/2018 6.5 (H)     LDL Cholesterol Calculated (mg/dL)   Date Value   05/09/2018 96   07/03/2008 106     LDL Cholesterol Direct (mg/dL)   Date Value   03/31/2011 103       Diabetes Management Resources  Hypertension Follow-up      Do you check your blood pressure regularly outside of the clinic? No     Are you following a low salt diet? No    Are your blood pressures ever more than 140 on the top number (systolic) OR more   than 90 on the bottom number (diastolic), for example  "140/90? Unsure     Amount of exercise or physical activity: 2-3 days/week for an average of 15-30 minutes    Problems taking medications regularly: No    Medication side effects: Interferon Beta-1a gives patient flu like symptoms     Diet: low carb       PROBLEMS TO ADD ON...    BP Readings from Last 3 Encounters:   07/18/19 122/79   12/08/18 132/80   10/01/18 (!) 140/94    Wt Readings from Last 3 Encounters:   07/18/19 103.4 kg (228 lb)   12/08/18 102.1 kg (225 lb)   10/01/18 102.1 kg (225 lb)                    PROBLEMS TO ADD ON...  Reviewed and updated as needed this visit by Provider  Meds         Review of Systems   ROS COMP: Constitutional, HEENT, cardiovascular, pulmonary, GI, , musculoskeletal, neuro, skin, endocrine and psych systems are negative, except as otherwise noted.      Objective    /79 (BP Location: Left arm, Patient Position: Sitting, Cuff Size: Adult Large)   Pulse 65   Temp 97.9  F (36.6  C) (Oral)   Resp 20   Ht 1.543 m (5' 0.75\")   Wt 103.4 kg (228 lb)   SpO2 100%   Breastfeeding? No   BMI 43.44 kg/m    Body mass index is 43.44 kg/m .  Physical Exam   GENERAL: healthy, alert and no distress  EYES: Eyes grossly normal to inspection, PERRL and conjunctivae and sclerae normal  HENT: ear canals and TM's normal, nose and mouth without ulcers or lesions  NECK: no adenopathy, no asymmetry, masses, or scars and thyroid normal to palpation  RESP: lungs clear to auscultation - no rales, rhonchi or wheezes  CV: regular rate and rhythm, normal S1 S2, no S3 or S4, no murmur, click or rub, no peripheral edema and peripheral pulses strong  ABDOMEN: soft, nontender, no hepatosplenomegaly, no masses and bowel sounds normal  SKIN: no suspicious lesions or rashes  NEURO: Normal strength and tone, mentation intact and speech normal  PSYCH: mentation appears normal, affect normal/bright  Diabetic foot exam: normal DP and PT pulses, no trophic changes or ulcerative lesions and normal sensory " exam    Diagnostic Test Results:  Labs reviewed in Epic  No results found for this or any previous visit (from the past 24 hour(s)).        Assessment & Plan     1. Type 2 diabetes mellitus with hyperglycemia, without long-term current use of insulin (H)    - Lipid panel reflex to direct LDL Fasting  - Basic metabolic panel  - Hemoglobin A1c  -TSH  - C FOOT EXAM  NO CHARGE  - Albumin Random Urine Quantitative with Creat Ratio    Medications refilled today for 1 yr  Follow up 6 months- unless A1C is above goal of < 7.  - metFORMIN (GLUCOPHAGE-XR) 500 MG 24 hr tablet; TAKE 2 TABLETS (1,000 MG) BY MOUTH 2 TIMES DAILY (WITH MEALS)---MAKE APPT  Dispense: 120 tablet; Refill: 11  - lisinopril (PRINIVIL/ZESTRIL) 2.5 MG tablet; Take 1 tablet (2.5 mg) by mouth daily  Dispense: 90 tablet; Refill: 3    2. Family planning, BCP (birth control pills) maintenance    - norgestrel-ethinyl estradiol (LO/OVRAL) 0.3-30 MG-MCG tablet; Take 1 tablet by mouth daily  Dispense: 90 tablet; Refill: 4    3. Benign essential hypertension- controlled     - lisinopril (PRINIVIL/ZESTRIL) 2.5 MG tablet; Take 1 tablet (2.5 mg) by mouth daily  Dispense: 90 tablet; Refill: 3    4. Mild intermittent asthma without complication    - albuterol (PROAIR HFA/PROVENTIL HFA/VENTOLIN HFA) 108 (90 Base) MCG/ACT inhaler; Inhale 2 puffs into the lungs every 6 hours as needed for shortness of breath / dyspnea or wheezing  Dispense: 1 Inhaler; Refill: 11    5. Multiple sclerosis (H)  Stopped interferon on own-6 months ago.  Having some eye symptoms  Will repeat brain MRI W & W/O .  Has seen NP with Dr Sheffield and willing to follow up if need be   History of low Vitamin D and will recheck that as well.    6. Bipolar 2 disorder (H)  Takes over the counter melatonin to help with sleep, otherwise no manic or hypomanic attacks- doing very well off medications for 2 yrs     7. Morbid obesity (H)  Doing well withy diet & encouraged more excercise        BMI:   Estimated  "body mass index is 43.44 kg/m  as calculated from the following:    Height as of this encounter: 1.543 m (5' 0.75\").    Weight as of this encounter: 103.4 kg (228 lb).   Weight management plan: Discussed healthy diet and exercise guidelines            Return in about 6 months (around 1/18/2020) for DM.    Shivani Clemons MD  [facility]        "

## 2019-07-18 NOTE — PATIENT INSTRUCTIONS
Dr Sheffield/ @ [facility]..    Call to schedule your imaging:[place] or [facility] [phone]

## 2019-07-18 NOTE — NURSING NOTE
"Chief Complaint   Patient presents with     Diabetes     Hypertension     /79 (BP Location: Left arm, Patient Position: Sitting, Cuff Size: Adult Large)   Pulse 65   Temp 97.9  F (36.6  C) (Oral)   Resp 20   Ht 1.543 m (5' 0.75\")   Wt 103.4 kg (228 lb)   SpO2 100%   Breastfeeding? No   BMI 43.44 kg/m   Estimated body mass index is 43.44 kg/m  as calculated from the following:    Height as of this encounter: 1.543 m (5' 0.75\").    Weight as of this encounter: 103.4 kg (228 lb).  bp completed using cuff size: large       Health Maintenance addressed:  ACT and Tdap    Pt declines to have TDAP. ACT completed     Moses Peng MA     "

## 2019-07-18 NOTE — RESULT ENCOUNTER NOTE
Dear Alejandra Diamond seeing you today in clinic  -A1C (test of diabetes control the last 2-3 months) is at your goal. Please continue with your current plan.     thanks

## 2019-07-19 ASSESSMENT — ASTHMA QUESTIONNAIRES: ACT_TOTALSCORE: 25

## 2019-07-19 NOTE — RESULT ENCOUNTER NOTE
-Cholesterol levels are near but not quite a goal levels for all.    HDL, good cholesterol is very low , > 50 is considered to protect your heart. a regular exercise program with at least 150 minutes of aerobic exercise per week, and eating a low saturated fat/low carbohydrate diet.   Bad cholesterol, LDL is high as well.  We should keep making life style changes and recheck it next year in 12 months.    -Kidney function is normal (Cr, GFR), Sodium is normal, Potassium is normal, Calcium is normal, Glucose is high and also will improve further with excercise and eating better.- while continuing all medications     -TSH (thyroid stimulating hormone) level is normal which indicates normal thyroid function.  -Vitamin D level is normal and getting 1000 IU daily in your diet or supplements is recommended.   -Microalbumin (urine protein) test is normal.  ADVISE: rechecking this annually.    Please keep us posted with questions or concerns .      Best Regards,    Shivani Clemons MD  [facility]  [phone]

## 2019-11-06 ENCOUNTER — HEALTH MAINTENANCE LETTER (OUTPATIENT)
Age: 39
End: 2019-11-06

## 2020-02-16 ENCOUNTER — HEALTH MAINTENANCE LETTER (OUTPATIENT)
Age: 40
End: 2020-02-16

## 2020-03-11 ENCOUNTER — OFFICE VISIT (OUTPATIENT)
Dept: OPHTHALMOLOGY | Facility: CLINIC | Age: 40
End: 2020-03-11
Attending: OPHTHALMOLOGY
Payer: COMMERCIAL

## 2020-03-11 DIAGNOSIS — H35.372 EPIRETINAL MEMBRANE (ERM) OF LEFT EYE: ICD-10-CM

## 2020-03-11 DIAGNOSIS — Z96.1 PSEUDOPHAKIA OF LEFT EYE: ICD-10-CM

## 2020-03-11 DIAGNOSIS — H35.353 CYSTOID MACULAR EDEMA OF BOTH EYES: ICD-10-CM

## 2020-03-11 DIAGNOSIS — H43.812 PVD (POSTERIOR VITREOUS DETACHMENT), LEFT EYE: ICD-10-CM

## 2020-03-11 DIAGNOSIS — H30.23: Primary | ICD-10-CM

## 2020-03-11 DIAGNOSIS — H40.60X0 STEROID-INDUCED GLAUCOMA: ICD-10-CM

## 2020-03-11 DIAGNOSIS — H25.041 POSTERIOR SUBCAPSULAR POLAR SENILE CATARACT OF RIGHT EYE: ICD-10-CM

## 2020-03-11 DIAGNOSIS — G35: Primary | ICD-10-CM

## 2020-03-11 DIAGNOSIS — T38.0X5A STEROID-INDUCED GLAUCOMA: ICD-10-CM

## 2020-03-11 PROCEDURE — 92134 CPTRZ OPH DX IMG PST SGM RTA: CPT | Mod: ZF | Performed by: OPHTHALMOLOGY

## 2020-03-11 PROCEDURE — G0463 HOSPITAL OUTPT CLINIC VISIT: HCPCS | Mod: ZF

## 2020-03-11 PROCEDURE — 92081 LIMITED VISUAL FIELD XM: CPT | Mod: ZF | Performed by: OPHTHALMOLOGY

## 2020-03-11 PROCEDURE — 92015 DETERMINE REFRACTIVE STATE: CPT | Mod: ZF

## 2020-03-11 PROCEDURE — 92250 FUNDUS PHOTOGRAPHY W/I&R: CPT | Mod: ZF | Performed by: OPHTHALMOLOGY

## 2020-03-11 ASSESSMENT — REFRACTION_MANIFEST
OD_ADD: +1.50
OS_CYLINDER: +5.50
OS_SPHERE: -3.25
OD_SPHERE: -4.50
OD_CYLINDER: +0.25
OS_AXIS: 094
OD_AXIS: 015
OS_ADD: +1.50

## 2020-03-11 ASSESSMENT — TONOMETRY
OD_IOP_MMHG: 09
OS_IOP_MMHG: 09
IOP_METHOD: TONOPEN

## 2020-03-11 ASSESSMENT — REFRACTION_WEARINGRX
OS_AXIS: 088
OD_AXIS: 013
OS_CYLINDER: +6.00
OS_CYLINDER: +5.00
OD_SPHERE: -3.25
OD_SPHERE: PLANO
OS_SPHERE: -1.00
OD_CYLINDER: +0.50
OD_CYLINDER: +0.25
OD_AXIS: 057
OS_SPHERE: -2.50
OS_AXIS: 085

## 2020-03-11 ASSESSMENT — VISUAL ACUITY
OS_CC+: +2
OD_CC: 20/40
OS_CC: 20/50
CORRECTION_TYPE: GLASSES
METHOD: SNELLEN - LINEAR
OD_CC+: -2

## 2020-03-11 ASSESSMENT — EXTERNAL EXAM - RIGHT EYE: OD_EXAM: NORMAL

## 2020-03-11 ASSESSMENT — CONF VISUAL FIELD
OS_NORMAL: 1
OD_NORMAL: 1

## 2020-03-11 ASSESSMENT — SLIT LAMP EXAM - LIDS
COMMENTS: NORMAL
COMMENTS: NORMAL

## 2020-03-11 ASSESSMENT — CUP TO DISC RATIO
OD_RATIO: 0.2
OS_RATIO: 0.2

## 2020-03-11 ASSESSMENT — EXTERNAL EXAM - LEFT EYE: OS_EXAM: NORMAL

## 2020-03-11 NOTE — PROGRESS NOTES
I have confirmed the patient's and reviewed Past Medical History, Past Surgical History, Social History, Family History, Problem List, Medication List and agree with Tech note.    CC: blurred vision ou, annual follow up    Interval hx: Vision is stable. Stable floaters after yag capsulotomy ~2 yrs ago but no new floater or flashes. Notes she saw her neurologist for MS ~2 yrs ago and she hasn't had any new symptoms.        HPI: Alejandra Forbes is a 40 year old female with history of intermediate uveitis and MS lesions found in MRI. She is here today for evaluation of intermediate uveitis.  She is not taking any drops now.  Received interferon for MS because she had MRI which showed many MS lesions. She was last seen ~2 yrs ago and has been symptoms free since then.     DM II is well controlled. 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 Intermediate uveitis with cystoid macular edema, left eye   - cystoid macular edema resolved based on today's (3/11/2020) OCT   - Improved VA; not using any drop now   - previous history of ocular steroid and oral prednisone use with steroid response.    - observe for now     2. Uveitic vs steroid responder Glaucoma    - s/p Trab OU in 2006 (Dr. Edward)   - IOP is good today each eye   - Follows with [facility]    3. Epiretinal membrane LEFT EYE>RIGHT EYE   - Stable, monitor    3. PCIOL left eye   - s/p yag capsulotomy; stable; observe     4. PSC RIGHT EYE   - Visually significant, patient wishes to observe for now    5. Multiple Sclerosis   - no symptoms; recommended to follow with Neurology      RTC 1 year for DFE and OCT     Harry More MD  Vitreoretinal surgery fellow  [facility]        Attestation:  I have seen and examined the patient with Dr. More and agree with the findings in this note,  as well as the interpretations of the diagnostic tests.      Genaro Mohan MD PhD.  Professor & Chair

## 2020-03-11 NOTE — NURSING NOTE
Chief Complaints and History of Present Illnesses   Patient presents with     Uveitis Follow-Up     Chief Complaint(s) and History of Present Illness(es)     Uveitis Follow-Up     Laterality: both eyes    Onset: 6 months ago              Comments     Pt. States that she is doing well. No change in VA BE. No pain BE. No flashes or floaters BE.  Fariha Cabello COT 8:36 AM March 11, 2020

## 2020-06-16 DIAGNOSIS — E11.65 TYPE 2 DIABETES MELLITUS WITH HYPERGLYCEMIA, WITHOUT LONG-TERM CURRENT USE OF INSULIN (H): ICD-10-CM

## 2020-06-16 RX ORDER — METFORMIN HCL 500 MG
TABLET, EXTENDED RELEASE 24 HR ORAL
Qty: 120 TABLET | Refills: 0 | Status: SHIPPED | OUTPATIENT
Start: 2020-06-16 | End: 2020-07-08

## 2020-06-16 NOTE — TELEPHONE ENCOUNTER
Reason for Call:  Medication or medication refill:    Do you use a [facility]?  Name of the pharmacy and phone number for the current request:       [id number] [address] [place], [place] [facility] [address]    Name of the medication requested:     metFORMIN (GLUCOPHAGE-XR) 500 MG 24 hr tablet  120 tablet     Other request: April is out of medication, she is having difficulty with Mychart so a phone call might be best but she is in meetings all day for work     Can we leave a detailed message on this number? YES    Phone number patient can be reached at: Cell number on file:    Telephone Information:   Mobile [phone]       Best Time: any    Call taken on 6/16/2020 at 12:59 PM by Concepcion Calvillo

## 2020-06-16 NOTE — TELEPHONE ENCOUNTER
"Requested Prescriptions   Pending Prescriptions Disp Refills     metFORMIN (GLUCOPHAGE-XR) 500 MG 24 hr tablet 120 tablet 0     Sig: TAKE 2 TABLETS (1,000 MG) BY MOUTH 2 TIMES DAILY (WITH MEALS)---MAKE APPT       Biguanide Agents Passed - 6/16/2020  1:02 PM        Passed - Patient is age 10 or older        Passed - Recent (12 mo) or future (30 days) visit within the authorizing provider's specialty      Patient has had an office visit with the authorizing provider or a provider within the authorizing providers department within the previous 12 mos or has a future within next 30 days. See \"Patient Info\" tab in inbasket, or \"Choose Columns\" in Meds & Orders section of the refill encounter.              Passed - Patient does NOT have a diagnosis of CHF.        Passed - Medication is active on med list        Passed - Patient is not pregnant        Passed - Patient has not had a positive pregnancy test within the past 12 mos.              Medication is being filled for 1 time refill only due to:  Patient needs to be seen because due for diabetes follow up.  "

## 2020-06-26 ENCOUNTER — TELEPHONE (OUTPATIENT)
Dept: NEUROLOGY | Facility: CLINIC | Age: 40
End: 2020-06-26

## 2020-06-26 NOTE — TELEPHONE ENCOUNTER
PA Initiation    Medication: Rebif Rebidose 44MCG/0.5ML (PENDING)  Insurance Company: [facility] [employer] - Phone [phone] Fax [phone]  Pharmacy Filling the Rx: [facility] - [place], [place] - [address]  Filling Pharmacy Phone:    Filling Pharmacy Fax:    Start Date: 6/26/2020

## 2020-06-29 NOTE — TELEPHONE ENCOUNTER
Prior Authorization Approval    Authorization Effective Date: 6/26/2020  Authorization Expiration Date: 6/26/2021  Medication: Rebif Rebidose- Approved   Approved Dose/Quantity:44mcg  Reference #:     Insurance Company: [facility] [employer] - Phone [phone] Fax [phone]  Expected CoPay:       CoPay Card Available:      Foundation Assistance Needed:    Which Pharmacy is filling the prescription (Not needed for infusion/clinic administered): [facility] - [place], [place] - [address] [place] [address]  Pharmacy Notified: Yes  Patient Notified: No

## 2020-07-08 DIAGNOSIS — E11.65 TYPE 2 DIABETES MELLITUS WITH HYPERGLYCEMIA, WITHOUT LONG-TERM CURRENT USE OF INSULIN (H): ICD-10-CM

## 2020-07-08 RX ORDER — METFORMIN HCL 500 MG
TABLET, EXTENDED RELEASE 24 HR ORAL
Qty: 120 TABLET | Refills: 0 | Status: SHIPPED | OUTPATIENT
Start: 2020-07-08 | End: 2020-08-17

## 2020-07-08 NOTE — LETTER
July 8, 2020 April FRANCIS Forbes  [address]   [place] [address]              Dear April,    In reviewing your recent refill request for metformin, it was noted that you are due for a fasting lab draw and a medication evaluation at an office visit.    Approval for a 30-day supply of the medication has been sent to your pharmacy.  Additional refills will be approved during your follow up visit.     Please contact our office at [phone] to schedule your lab and doctor's appointment.        Sincerely,    [facility]

## 2020-07-08 NOTE — TELEPHONE ENCOUNTER
Medication is being filled for 1 time refill only due to:  Patient needs to be seen because due for fasting physical.    Sent letter reminder to schedule.    Jyotsna Vora RN

## 2020-09-01 ENCOUNTER — VIRTUAL VISIT (OUTPATIENT)
Dept: FAMILY MEDICINE | Facility: CLINIC | Age: 40
End: 2020-09-01
Payer: COMMERCIAL

## 2020-09-01 DIAGNOSIS — Z30.41 FAMILY PLANNING, BCP (BIRTH CONTROL PILLS) MAINTENANCE: ICD-10-CM

## 2020-09-01 DIAGNOSIS — E11.65 TYPE 2 DIABETES MELLITUS WITH HYPERGLYCEMIA, WITHOUT LONG-TERM CURRENT USE OF INSULIN (H): Primary | ICD-10-CM

## 2020-09-01 DIAGNOSIS — J45.20 MILD INTERMITTENT ASTHMA WITHOUT COMPLICATION: ICD-10-CM

## 2020-09-01 PROCEDURE — 99214 OFFICE O/P EST MOD 30 MIN: CPT | Mod: 95 | Performed by: FAMILY MEDICINE

## 2020-09-01 RX ORDER — METFORMIN HCL 500 MG
TABLET, EXTENDED RELEASE 24 HR ORAL
Qty: 120 TABLET | Refills: 0 | Status: SHIPPED | OUTPATIENT
Start: 2020-09-01 | End: 2020-10-05

## 2020-09-01 RX ORDER — ALBUTEROL SULFATE 90 UG/1
2 AEROSOL, METERED RESPIRATORY (INHALATION) EVERY 6 HOURS PRN
Qty: 1 INHALER | Refills: 11 | Status: SHIPPED | OUTPATIENT
Start: 2020-09-01 | End: 2022-06-20

## 2020-09-01 NOTE — PROGRESS NOTES
"Alejandra Forbes is a 40 year old female who is being evaluated via a billable video visit.      The patient has been notified of following:     \"This video visit will be conducted via a call between you and your physician/provider. We have found that certain health care needs can be provided without the need for an in-person physical exam.  This service lets us provide the care you need with a video conversation.  If a prescription is necessary we can send it directly to your pharmacy.  If lab work is needed we can place an order for that and you can then stop by our lab to have the test done at a later time.    Video visits are billed at different rates depending on your insurance coverage.  Please reach out to your insurance provider with any questions.    If during the course of the call the physician/provider feels a video visit is not appropriate, you will not be charged for this service.\"    Patient has given verbal consent for Video visit? Yes  How would you like to obtain your AVS? MyChart  If you are dropped from the video visit, the video invite should be resent to: Text to cell phone: [phone]  Will anyone else be joining your video visit? No    Subjective     Alejandra Forbes is a 40 year old female who presents today via video visit for the following health issues:    HPI    Patient is due for her Physical but unable to come in at this time  - needs to have some medications refilled (Metformin)  SMBG- once daily 120-130. Feels Diabetes  Well controlled with metformin, & Intentional weight loss, with excercise and diet.  Swim daily, Working from  Home .   Feels very rushed today- appointment at 9am for work.  Denies any concerns with mental health .   Reports life style changes have been instrumental.    Also needs refill on oral contraceptive pills  No side effects.  Non smoker. No deep venous thrombosis.      History of mild intermittent asthma- well controlled on as needed  Ventolin.      Video Start " Time: 8:53 AM        Review of Systems   Constitutional, HEENT, cardiovascular, pulmonary, GI, , musculoskeletal, neuro, skin, endocrine and psych systems are negative, except as otherwise noted.      Objective           Vitals:  No vitals were obtained today due to virtual visit.    Physical Exam     GENERAL: Healthy, alert and no distress  EYES: Eyes grossly normal to inspection.  No discharge or erythema, or obvious scleral/conjunctival abnormalities.  RESP: No audible wheeze, cough, or visible cyanosis.  No visible retractions or increased work of breathing.    SKIN: Visible skin clear. No significant rash, abnormal pigmentation or lesions.  NEURO: Cranial nerves grossly intact.  Mentation and speech appropriate for age.  PSYCH: Mentation appears normal, affect normal/bright, judgement and insight intact, normal speech and appearance well-groomed.              Assessment & Plan     ICD-10-CM    1. Type 2 diabetes mellitus with hyperglycemia, without long-term current use of insulin (H)  E11.65 metFORMIN (GLUCOPHAGE-XR) 500 MG 24 hr tablet   2. Family planning, BCP (birth control pills) maintenance  Z30.41 norgestrel-ethinyl estradiol (LO/OVRAL) 0.3-30 MG-MCG tablet   3. Mild intermittent asthma without complication  J45.20 albuterol (PROAIR HFA/PROVENTIL HFA/VENTOLIN HFA) 108 (90 Base) MCG/ACT inhaler     Type 2 diabetes mellitus with hyperglycemia, without long-term current use of insulin (H)  Refill is given.  - metFORMIN (GLUCOPHAGE-XR) 500 MG 24 hr tablet  Dispense: 120 tablet; Refill: 0  -she has stopped ace inhibitor on own  Need Office visit , will make an appointment.  Eye- exam- Up to date    Family planning, BCP (birth control pills) maintenance  Need refill on oral contraceptive pills-  - norgestrel-ethinyl estradiol (LO/OVRAL) 0.3-30 MG-MCG tablet  Dispense: 90 tablet; Refill: 0  Potential medication side effects were discussed with the patient; let me know if any occur.    Mild intermittent asthma  without complication  Well controlled. Need inhaler only if triggered by upper respiratiry tract infection.   - albuterol (PROAIR HFA/PROVENTIL HFA/VENTOLIN HFA) 108 (90 Base) MCG/ACT inhaler  Dispense: 1 Inhaler; Refill: 11  ACT/AAP completed           Return in about 2 weeks (around 9/15/2020) for routine physical.    Shivani Clemons MD  [facility]      Video-Visit Details    Type of service:  Video Visit    Video End Time:8:59 AM    Originating Location (pt. Location): Home    Distant Location (provider location):  [facility]     Platform [facility] for Video Visit: [facility]

## 2020-09-01 NOTE — LETTER
My Asthma Action Plan    Name: Alejandra Forbes   YOB: 1980  Date: 9/1/2020   My doctor: Shivani Clemons MD   My clinic: [facility]        My Rescue Medicine:   Albuterol inhaler (Proair/Ventolin/Proventil HFA)  2-4 puffs EVERY 4 HOURS as needed. Use a spacer if recommended by your provider.   My Asthma Severity:   Intermittent / Exercise Induced  Know your asthma triggers: upper respiratory infections             GREEN ZONE   Good Control    I feel good    No cough or wheeze    Can work, sleep and play without asthma symptoms       Take your asthma control medicine every day.     1. If exercise triggers your asthma, take your rescue medication    15 minutes before exercise or sports, and    During exercise if you have asthma symptoms  2. Spacer to use with inhaler: If you have a spacer, make sure to use it with your inhaler             YELLOW ZONE Getting Worse  I have ANY of these:    I do not feel good    Cough or wheeze    Chest feels tight    Wake up at night   1. Keep taking your Green Zone medications  2. Start taking your rescue medicine:    every 20 minutes for up to 1 hour. Then every 4 hours for 24-48 hours.  3. If you stay in the Yellow Zone for more than 12-24 hours, contact your doctor.  4. If you do not return to the Green Zone in 12-24 hours or you get worse, start taking your oral steroid medicine if prescribed by your provider.           RED ZONE Medical Alert - Get Help  I have ANY of these:    I feel awful    Medicine is not helping    Breathing getting harder    Trouble walking or talking    Nose opens wide to breathe       1. Take your rescue medicine NOW  2. If your provider has prescribed an oral steroid medicine, start taking it NOW  3. Call your doctor NOW  4. If you are still in the Red Zone after 20 minutes and you have not reached your doctor:    Take your rescue medicine again and    Call 911 or go to the emergency room right away    See your regular doctor  within 2 weeks of an Emergency Room or Urgent Care visit for follow-up treatment.          Annual Reminders:  Meet with Asthma Educator,  Flu Shot in the Fall, consider Pneumonia Vaccination for patients with asthma (aged 19 and older).    Pharmacy: [id number] - [place], [place] - [address]    Electronically signed by Shivani Clemons MD   Date: 09/01/20                    Asthma Triggers  How To Control Things That Make Your Asthma Worse    Triggers are things that make your asthma worse.  Look at the list below to help you find your triggers and   what you can do about them. You can help prevent asthma flare-ups by staying away from your triggers.      Trigger                                                          What you can do   Cigarette Smoke  Tobacco smoke can make asthma worse. Do not allow smoking in your home, car or around you.  Be sure no one smokes at a child s day care or school.  If you smoke, ask your health care provider for ways to help you quit.  Ask family members to quit too.  Ask your health care provider for a referral to Quit Plan to help you quit smoking, or call 5-234-049-PLAN.     Colds, Flu, Bronchitis  These are common triggers of asthma. Wash your hands often.  Don t touch your eyes, nose or mouth.  Get a flu shot every year.     Dust Mites  These are tiny bugs that live in cloth or carpet. They are too small to see. Wash sheets and blankets in hot water every week.   Encase pillows and mattress in dust mite proof covers.  Avoid having carpet if you can. If you have carpet, vacuum weekly.   Use a dust mask and HEPA vacuum.   Pollen and Outdoor Mold  Some people are allergic to trees, grass, or weed pollen, or molds. Try to keep your windows closed.  Limit time out doors when pollen count is high.   Ask you health care provider about taking medicine during allergy season.     Animal Dander  Some people are allergic to skin flakes, urine or saliva from pets with fur  or feathers. Keep pets with fur or feathers out of your home.    If you can t keep the pet outdoors, then keep the pet out of your bedroom.  Keep the bedroom door closed.  Keep pets off cloth furniture and away from stuffed toys.     Mice, Rats, and Cockroaches  Some people are allergic to the waste from these pests.   Cover food and garbage.  Clean up spills and food crumbs.  Store grease in the refrigerator.   Keep food out of the bedroom.   Indoor Mold  This can be a trigger if your home has high moisture. Fix leaking faucets, pipes, or other sources of water.   Clean moldy surfaces.  Dehumidify basement if it is damp and smelly.   Smoke, Strong Odors, and Sprays  These can reduce air quality. Stay away from strong odors and sprays, such as perfume, powder, hair spray, paints, smoke incense, paint, cleaning products, candles and new carpet.   Exercise or Sports  Some people with asthma have this trigger. Be active!  Ask your doctor about taking medicine before sports or exercise to prevent symptoms.    Warm up for 5-10 minutes before and after sports or exercise.     Other Triggers of Asthma  Cold air:  Cover your nose and mouth with a scarf.  Sometimes laughing or crying can be a trigger.  Some medicines and food can trigger asthma.

## 2020-09-02 ASSESSMENT — ASTHMA QUESTIONNAIRES: ACT_TOTALSCORE: 25

## 2020-11-23 ENCOUNTER — AMBULATORY - HEALTHEAST (OUTPATIENT)
Dept: FAMILY MEDICINE | Facility: CLINIC | Age: 40
End: 2020-11-23

## 2020-11-23 ENCOUNTER — VIRTUAL VISIT (OUTPATIENT)
Dept: FAMILY MEDICINE | Facility: OTHER | Age: 40
End: 2020-11-23
Payer: COMMERCIAL

## 2020-11-23 DIAGNOSIS — Z20.822 SUSPECTED 2019 NOVEL CORONAVIRUS INFECTION: ICD-10-CM

## 2020-11-23 PROCEDURE — 99421 OL DIG E/M SVC 5-10 MIN: CPT | Performed by: FAMILY MEDICINE

## 2020-11-23 NOTE — PROGRESS NOTES
"Date: 2020 09:43:11  Clinician: Rafat Gonzalez  Clinician NPI: [id number]  Patient: Alejandra Forbes  Patient : 1980  Patient Address: [address] [place] [address]  Patient Phone: [phone]  Visit Protocol: URI  Patient Summary:  April is a 40 year old ( : 1980 ) female who initiated a [facility] Visit for COVID-19 (Coronavirus) evaluation and screening. When asked the question \"Please sign me up to receive news, health information and promotions from [facility].\", April responded \"No\".    April states her symptoms started suddenly 3-4 days ago. After her symptoms started, they improved and then got worse again.   Her symptoms consist of malaise, ageusia, a cough, nasal congestion, and anosmia.   Symptom details     Nasal secretions: The color of her mucus is clear.    Cough: April coughs a few times an hour and her cough is not more bothersome at night. Phlegm comes into her throat when she coughs. She does not believe her cough is caused by post-nasal drip. The color of the phlegm is clear.      April denies having vomiting, rhinitis, facial pain or pressure, myalgias, chills, sore throat, teeth pain, diarrhea, ear pain, headache, wheezing, fever, and nausea. She also denies taking antibiotic medication in the past month and having recent facial or sinus surgery in the past 60 days. She is not experiencing dyspnea.   Precipitating events  She has not recently been exposed to someone with influenza. April has not been in close contact with any high risk individuals.   Pertinent COVID-19 (Coronavirus) information  April does not work or volunteer as healthcare worker or a . In the past 14 days, April has not worked or volunteered at a healthcare facility or group living setting.   In the past 14 days, she also has not lived in a congregate living setting.   April has not had a close contact with a laboratory-confirmed COVID-19 patient within 14 days of symptom onset.    Since " December 2019, April has not been tested for COVID-19 and has not had upper respiratory infection or influenza-like illness.   Pertinent medical history  April does not get yeast infections when she takes antibiotics.   April does not need a return to work/school note.   Weight: 192 lbs   April does not smoke or use smokeless tobacco.   She denies pregnancy and denies breastfeeding. She has menstruated in the past month.   Additional information as reported by the patient (free text): I've been tired on and off for 3 weeks or so. Had a sore throat briefly two weeks ago.   Weight: 192 lbs    MEDICATIONS: glyburide-metformin oral, ALLERGIES: NKDA  Clinician Response:  Dear April,   Your symptoms show that you may have coronavirus (COVID-19). This illness can cause fever, cough and trouble breathing. Many people get a mild case and get better on their own. Some people can get very sick.  What should I do?  We would like to test you for this virus.   1. Please call [phone] to schedule your visit. Explain that you were referred by [facility] to have a COVID-19 test. Be ready to share your [facility] visit ID number.  * If you need to schedule in [facility] please call [phone] or for [employer] employees please call [phone].  * If you need to schedule in the [place] area please call [phone]. [place] employees call [phone].  The following will serve as your written order for this COVID Test, ordered by me, for the indication of suspected COVID [Z20.828]: The test will be ordered in [employer], our electronic health record, after you are scheduled. It will show as ordered and authorized by Anderson Boyer MD.  Order: COVID-19 (Coronavirus) PCR for SYMPTOMATIC testing from [facility].   2. When it's time for your COVID test:  Stay at least 6 feet away from others. (If someone will drive you to your test, stay in the backseat, as far away from the  as you can.)   Cover your mouth and nose with a mask, tissue or  "washcloth.  Go straight to the testing site. Don't make any stops on the way there or back.      3.Starting now: Stay home and away from others (self-isolate) until:   You've had no fever---and no medicine that reduces fever---for one full day (24 hours). And...   Your other symptoms have gotten better. For example, your cough or breathing has improved. And...   At least 10 days have passed since your symptoms started.       During this time, don't leave the house except for testing or medical care.   Stay in your own room, even for meals. Use your own bathroom if you can.   Stay away from others in your home. No hugging, kissing or shaking hands. No visitors.  Don't go to work, school or anywhere else.    Clean \"high touch\" surfaces often (doorknobs, counters, handles, etc.). Use a household cleaning spray or wipes. You'll find a full list of  on the EPA website: www.epa.gov/pesticide-registration/list-n-disinfectants-use-against-sars-cov-2.   Cover your mouth and nose with a mask, tissue or washcloth to avoid spreading germs.  Wash your hands and face often. Use soap and water.  Caregivers in these groups are at risk for severe illness due to COVID-19:  o People 65 years and older  o People who live in a nursing home or long-term care facility  o People with chronic disease (lung, heart, cancer, diabetes, kidney, liver, immunologic)  o People who have a weakened immune system, including those who:   Are in cancer treatment  Take medicine that weakens the immune system, such as corticosteroids  Had a bone marrow or organ transplant  Have an immune deficiency  Have poorly controlled HIV or AIDS  Are obese (body mass index of 40 or higher)  Smoke regularly   o Caregivers should wear gloves while washing dishes, handling laundry and cleaning bedrooms and bathrooms.  o Use caution when washing and drying laundry: Don't shake dirty laundry, and use the warmest water setting that you can.  o For more tips, go to " www.cdc.gov/coronavirus/2019-ncov/downloads/10Things.pdf.    4.Sign up for [employer]. We know it's scary to hear that you might have COVID-19. We want to track your symptoms to make sure you're okay over the next 2 weeks. Please look for an email from [employer]---this is a free, online program that we'll use to keep in touch. To sign up, follow the link in the email. Learn more at [email or web address]  How can I take care of myself?   Get lots of rest. Drink extra fluids (unless a doctor has told you not to).   Take Tylenol (acetaminophen) for fever or pain. If you have liver or kidney problems, ask your family doctor if it's okay to take Tylenol.   Adults can take either:    650 mg (two 325 mg pills) every 4 to 6 hours, or...   1,000 mg (two 500 mg pills) every 8 hours as needed.    Note: Don't take more than 3,000 mg in one day. Acetaminophen is found in many medicines (both prescribed and over-the-counter medicines). Read all labels to be sure you don't take too much.   For children, check the Tylenol bottle for the right dose. The dose is based on the child's age or weight.    If you have other health problems (like cancer, heart failure, an organ transplant or severe kidney disease): Call your specialty clinic if you don't feel better in the next 2 days.       Know when to call 911. Emergency warning signs include:    Trouble breathing or shortness of breath Pain or pressure in the chest that doesn't go away Feeling confused like you haven't felt before, or not being able to wake up Bluish-colored lips or face.  Where can I get more information?    [facility] -- About COVID-19: [email or web address]   CDC -- What to Do If You're Sick: www.cdc.gov/coronavirus/2019-ncov/about/steps-when-sick.html   CDC -- Ending Home Isolation: www.cdc.gov/coronavirus/2019-ncov/hcp/disposition-in-home-patients.html   CDC -- Caring for Someone:  www.cdc.gov/coronavirus/2019-ncov/if-you-are-sick/care-for-someone.html   [facility] -- Interim Guidance for Hospital Discharge to Home: www.health.[facility].[place]/diseases/coronavirus/hcp/hospdischarge.pdf   [facility] clinical trials (COVID-19 research studies): clinicalaffairs.[email or web address]-clinical-trials    Below are the COVID-19 hotlines at the [place] Department of Health ([facility]). Interpreters are available.    For health questions: Call 908-279-6832 or 1-656.166.3665 (7 a.m. to 7 p.m.) For questions about schools and childcare: Call 825-415-2669 or 1-581.495.1191 (7 a.m. to 7 p.m.)    Diagnosis: Contact with and (suspected) exposure to other viral communicable diseases  Diagnosis ICD: Z20.828

## 2020-11-23 NOTE — PROGRESS NOTES
"Date: 2020 10:49:11  Clinician: Rafat Gonzalez  Clinician NPI: [id number]  Patient: Alejandra Forbes  Patient : 1980  Patient Address: [address] [place] [address]  Patient Phone: [phone]  Visit Protocol: URI  Patient Summary:  April is a 40 year old ( : 1980 ) female who initiated a [facility] Visit for COVID-19 (Coronavirus) evaluation and screening. When asked the question \"Please sign me up to receive news, health information and promotions from [facility].\", April responded \"Yes\".    April states her symptoms started suddenly 3-4 days ago. After her symptoms started, they improved and then got worse again.   Her symptoms consist of malaise, ageusia, a cough, nasal congestion, and anosmia.   Symptom details     Nasal secretions: The color of her mucus is clear.    Cough: April coughs a few times an hour and her cough is not more bothersome at night. Phlegm comes into her throat when she coughs. She does not believe her cough is caused by post-nasal drip. The color of the phlegm is clear.      April denies having vomiting, rhinitis, facial pain or pressure, myalgias, chills, sore throat, teeth pain, diarrhea, ear pain, headache, wheezing, fever, and nausea. She also denies taking antibiotic medication in the past month and having recent facial or sinus surgery in the past 60 days. She is not experiencing dyspnea.   Precipitating events  She has not recently been exposed to someone with influenza. April has not been in close contact with any high risk individuals.   Pertinent COVID-19 (Coronavirus) information  April does not work or volunteer as healthcare worker or a . In the past 14 days, April has not worked or volunteered at a healthcare facility or group living setting.   In the past 14 days, she also has not lived in a congregate living setting.   April has not had a close contact with a laboratory-confirmed COVID-19 patient within 14 days of symptom onset.    Since " December 2019, April has not been tested for COVID-19 and has not had upper respiratory infection or influenza-like illness.   Pertinent medical history  April does not get yeast infections when she takes antibiotics.   April does not need a return to work/school note.   Weight: 192 lbs   April does not smoke or use smokeless tobacco.   She denies pregnancy and denies breastfeeding. She has menstruated in the past month.   Additional information as reported by the patient (free text): Very tired which has been on and off for about two weeks. Had a slight cold at the end of October.   Weight: 192 lbs  Reason for repeat visit for the same protocol within 24 hours:  I don't see the results of the intake I just took  See the History of referred by protocol and completed visits section for details on previous visits (visits currently in queue to be diagnosed will not appear in this section).    MEDICATIONS: metformin oral, ALLERGIES: NKDA  Clinician Response:  Dear April,   Your symptoms show that you may have coronavirus (COVID-19). This illness can cause fever, cough and trouble breathing. Many people get a mild case and get better on their own. Some people can get very sick.  What should I do?  We would like to test you for this virus.   1. Please call [phone] to schedule your visit. Explain that you were referred by [facility] to have a COVID-19 test. Be ready to share your [facility] visit ID number.  * If you need to schedule in [facility] please call [phone] or for [employer] employees please call [phone].  * If you need to schedule in the [place] area please call [phone]. [employer] employees call [phone].  The following will serve as your written order for this COVID Test, ordered by me, for the indication of suspected COVID [Z20.828]: The test will be ordered in [facility] [facility], our electronic health record, after you are scheduled. It will show as ordered and authorized by Anderson Boyer MD.  Order: COVID-19  "(Coronavirus) PCR for SYMPTOMATIC testing from [facility].   2. When it's time for your COVID test:  Stay at least 6 feet away from others. (If someone will drive you to your test, stay in the backseat, as far away from the  as you can.)   Cover your mouth and nose with a mask, tissue or washcloth.  Go straight to the testing site. Don't make any stops on the way there or back.      3.Starting now: Stay home and away from others (self-isolate) until:   You've had no fever---and no medicine that reduces fever---for one full day (24 hours). And...   Your other symptoms have gotten better. For example, your cough or breathing has improved. And...   At least 10 days have passed since your symptoms started.       During this time, don't leave the house except for testing or medical care.   Stay in your own room, even for meals. Use your own bathroom if you can.   Stay away from others in your home. No hugging, kissing or shaking hands. No visitors.  Don't go to work, school or anywhere else.    Clean \"high touch\" surfaces often (doorknobs, counters, handles, etc.). Use a household cleaning spray or wipes. You'll find a full list of  on the EPA website: www.epa.gov/pesticide-registration/list-n-disinfectants-use-against-sars-cov-2.   Cover your mouth and nose with a mask, tissue or washcloth to avoid spreading germs.  Wash your hands and face often. Use soap and water.  Caregivers in these groups are at risk for severe illness due to COVID-19:  o People 65 years and older  o People who live in a nursing home or long-term care facility  o People with chronic disease (lung, heart, cancer, diabetes, kidney, liver, immunologic)  o People who have a weakened immune system, including those who:   Are in cancer treatment  Take medicine that weakens the immune system, such as corticosteroids  Had a bone marrow or organ transplant  Have an immune deficiency  Have poorly controlled HIV or AIDS  Are obese (body mass index " of 40 or higher)  Smoke regularly   o Caregivers should wear gloves while washing dishes, handling laundry and cleaning bedrooms and bathrooms.  o Use caution when washing and drying laundry: Don't shake dirty laundry, and use the warmest water setting that you can.  o For more tips, go to www.cdc.gov/coronavirus/2019-ncov/downloads/10Things.pdf.    4.Sign up for [employer]. We know it's scary to hear that you might have COVID-19. We want to track your symptoms to make sure you're okay over the next 2 weeks. Please look for an email from [employer]---this is a free, online program that we'll use to keep in touch. To sign up, follow the link in the email. Learn more at [email or web address]  How can I take care of myself?   Get lots of rest. Drink extra fluids (unless a doctor has told you not to).   Take Tylenol (acetaminophen) for fever or pain. If you have liver or kidney problems, ask your family doctor if it's okay to take Tylenol.   Adults can take either:    650 mg (two 325 mg pills) every 4 to 6 hours, or...   1,000 mg (two 500 mg pills) every 8 hours as needed.    Note: Don't take more than 3,000 mg in one day. Acetaminophen is found in many medicines (both prescribed and over-the-counter medicines). Read all labels to be sure you don't take too much.   For children, check the Tylenol bottle for the right dose. The dose is based on the child's age or weight.    If you have other health problems (like cancer, heart failure, an organ transplant or severe kidney disease): Call your specialty clinic if you don't feel better in the next 2 days.       Know when to call 911. Emergency warning signs include:    Trouble breathing or shortness of breath Pain or pressure in the chest that doesn't go away Feeling confused like you haven't felt before, or not being able to wake up Bluish-colored lips or face.  Where can I get more information?   [facility] -- About COVID-19:  [email or web address]   CDC -- What to Do If You're Sick: www.cdc.gov/coronavirus/2019-ncov/about/steps-when-sick.html   CDC -- Ending Home Isolation: www.cdc.gov/coronavirus/2019-ncov/hcp/disposition-in-home-patients.html   CDC -- Caring for Someone: www.cdc.gov/coronavirus/2019-ncov/if-you-are-sick/care-for-someone.html   [facility] -- Interim Guidance for Hospital Discharge to Home: www.[facility].mn./diseases/coronavirus/hcp/hospdischarge.pdf   [facility] clinical trials (COVID-19 research studies): clinicalaffairs.[facility]-clinical-trials    Below are the COVID-19 hotlines at the [place] Department of Health ([facility]). Interpreters are available.    For health questions: Call 547-655-3949 or 1-204.764.1037 (7 a.m. to 7 p.m.) For questions about schools and childcare: Call 229-449-3931 or 1-439.324.7457 (7 a.m. to 7 p.m.)    Diagnosis: Contact with and (suspected) exposure to other viral communicable diseases  Diagnosis ICD: Z20.828

## 2020-11-24 ENCOUNTER — MYC MEDICAL ADVICE (OUTPATIENT)
Dept: FAMILY MEDICINE | Facility: CLINIC | Age: 40
End: 2020-11-24

## 2020-11-24 ENCOUNTER — AMBULATORY - HEALTHEAST (OUTPATIENT)
Dept: FAMILY MEDICINE | Facility: CLINIC | Age: 40
End: 2020-11-24

## 2020-11-24 DIAGNOSIS — Z20.822 SUSPECTED 2019 NOVEL CORONAVIRUS INFECTION: ICD-10-CM

## 2020-11-25 ENCOUNTER — COMMUNICATION - HEALTHEAST (OUTPATIENT)
Dept: SCHEDULING | Facility: CLINIC | Age: 40
End: 2020-11-25

## 2020-11-26 ENCOUNTER — COMMUNICATION - HEALTHEAST (OUTPATIENT)
Dept: SCHEDULING | Facility: CLINIC | Age: 40
End: 2020-11-26

## 2020-11-26 ENCOUNTER — NURSE TRIAGE (OUTPATIENT)
Dept: NURSING | Facility: CLINIC | Age: 40
End: 2020-11-26

## 2020-11-29 ENCOUNTER — HEALTH MAINTENANCE LETTER (OUTPATIENT)
Age: 40
End: 2020-11-29

## 2020-12-02 ENCOUNTER — NURSE TRIAGE (OUTPATIENT)
Dept: NURSING | Facility: CLINIC | Age: 40
End: 2020-12-02

## 2020-12-02 NOTE — TELEPHONE ENCOUNTER
Called about an intermittent chest tightness that hs been going on x 1 week.  States that she has chest pain when she take  deep breaths.  Caller states she experiences more chest tightness/pain later in the day and at night.  Per care protocol/care advice, caller suppose to be seen in office today. This triage nurse called the office to find out if patient should come in due to her positive COVID-19 status, the triage nurse (Candace) at the [facility] advised this triage nurse to send patient to emergency department for evaluation.  Caller was informed.  Aminata Goodman RN.  COVID 19 Nurse Triage Plan/Patient Instructions    Please be aware that novel coronavirus (COVID-19) may be circulating in the community. If you develop symptoms such as fever, cough, or SOB or if you have concerns about the presence of another infection including coronavirus (COVID-19), please contact your health care provider or visit [email or web address].     Disposition/Instructions    ED Visit recommended. Follow protocol based instructions.     Bring Your Own Device:  Please also bring your smart device(s) (smart phones, tablets, laptops) and their charging cables for your personal use and to communicate with your care team during your visit.    Thank you for taking steps to prevent the spread of this virus.  o Limit your contact with others.  o Wear a simple mask to cover your cough.  o Wash your hands well and often.    Resources    [facility]: About COVID-19: [email or web address]    CDC: What to Do If You're Sick: www.cdc.gov/coronavirus/2019-ncov/about/steps-when-sick.html    CDC: Ending Home Isolation: www.cdc.gov/coronavirus/2019-ncov/hcp/disposition-in-home-patients.html     CDC: Caring for Someone: www.cdc.gov/coronavirus/2019-ncov/if-you-are-sick/care-for-someone.html     [facility]: Interim Guidance for Hospital Discharge to Home: www.health.[facility].[place]/diseases/coronavirus/hcp/hospdischarge.pdf    [facility]  clinical trials (COVID-19 research studies): clinicalaffairs.[facility]/n-clinical-trials     Below are the COVID-19 hotlines at the [place] Department of Health ([facility]). Interpreters are available.   o For health questions: Call 315-715-4079 or 1-202.366.7584 (7 a.m. to 7 p.m.)  o For questions about schools and childcare: Call 907-061-4540 or 1-871.166.6608 (7 a.m. to 7 p.m.)                     Additional Information    Negative: Severe difficulty breathing (e.g., struggling for each breath, speaks in single words)    Negative: Passed out (i.e., fainted, collapsed and was not responding)    Negative: Chest pain lasting longer than 5 minutes and ANY of the following:* Over 50 years old* Over 30 years old and at least one cardiac risk factor (i.e., high blood pressure, diabetes, high cholesterol, obesity, smoker or strong family history of heart disease)* Pain is crushing, pressure-like, or heavy * Took nitroglycerin and chest pain was not relieved* History of heart disease (i.e., angina, heart attack, bypass surgery, angioplasty, CHF)    Negative: Visible sweat on face or sweat dripping down face    Negative: Sounds like a life-threatening emergency to the triager    Negative: Followed an injury to chest    Negative: SEVERE chest pain    Negative: Pain also present in shoulder(s) or arm(s) or jaw    Negative: Difficulty breathing    Negative: Cocaine use within last 3 days    Negative: History of prior 'blood clot' in leg or lungs (i.e., deep vein thrombosis, pulmonary embolism)    Negative: Recent illness requiring prolonged bed rest (i.e., immobilization)    Negative: Hip or leg fracture in past 2 months (e.g, or had cast on leg or ankle)    Negative: Major surgery in the past month    Negative: Recent long-distance travel with prolonged time in car, bus, plane, or train (i.e., within past 2 weeks; 6 or more hours duration)    Negative: Heart beating irregularly or very rapidly    Negative: Chest pain lasting  longer than 5 minutes    Negative: Intermittent chest pain and pain has been increasing in severity or frequency    Negative: Dizziness or lightheadedness    Negative: Coughing up blood    Negative: Patient sounds very sick or weak to the triager    Negative: Fever > 100.5 F (38.1 C)    Intermittent chest pains persist > 3 days    Protocols used: CHEST PAIN-A-OH

## 2020-12-07 DIAGNOSIS — E11.65 TYPE 2 DIABETES MELLITUS WITH HYPERGLYCEMIA, WITHOUT LONG-TERM CURRENT USE OF INSULIN (H): ICD-10-CM

## 2020-12-10 NOTE — TELEPHONE ENCOUNTER
AS,  Left VM #2 for patient  See below messages  No response from patient to our outreach  Please advise on further refill  Thanks,  Cherelle ADAN RN

## 2020-12-14 RX ORDER — METFORMIN HCL 500 MG
TABLET, EXTENDED RELEASE 24 HR ORAL
Qty: 120 TABLET | Refills: 0 | Status: SHIPPED | OUTPATIENT
Start: 2020-12-14 | End: 2021-01-11

## 2020-12-22 DIAGNOSIS — Z30.41 FAMILY PLANNING, BCP (BIRTH CONTROL PILLS) MAINTENANCE: ICD-10-CM

## 2020-12-24 RX ORDER — NORGESTREL-ETHINYL ESTRADIOL 0.3-0.03MG
TABLET ORAL
Qty: 28 TABLET | Refills: 0 | Status: SHIPPED | OUTPATIENT
Start: 2020-12-24 | End: 2021-01-26

## 2020-12-24 NOTE — TELEPHONE ENCOUNTER
OCP:    Note from 9/1/2020 virtual visit:    Return in about 2 weeks (around 9/15/2020) for routine physical.     Medication is being filled for 1 time refill only due to:  Patient needs to be seen because due for physical.     Candace Wong RN

## 2021-04-06 ENCOUNTER — IMMUNIZATION (OUTPATIENT)
Dept: FAMILY MEDICINE | Facility: CLINIC | Age: 41
End: 2021-04-06
Payer: COMMERCIAL

## 2021-04-06 PROCEDURE — 91301 PR COVID VAC MODERNA 100 MCG/0.5 ML IM: CPT

## 2021-04-06 PROCEDURE — 0011A PR COVID VAC MODERNA 100 MCG/0.5 ML IM: CPT

## 2021-04-10 ENCOUNTER — HEALTH MAINTENANCE LETTER (OUTPATIENT)
Age: 41
End: 2021-04-10

## 2021-05-04 ENCOUNTER — IMMUNIZATION (OUTPATIENT)
Dept: FAMILY MEDICINE | Facility: CLINIC | Age: 41
End: 2021-05-04
Attending: FAMILY MEDICINE
Payer: COMMERCIAL

## 2021-05-04 PROCEDURE — 91301 PR COVID VAC MODERNA 100 MCG/0.5 ML IM: CPT

## 2021-05-04 PROCEDURE — 0012A PR COVID VAC MODERNA 100 MCG/0.5 ML IM: CPT

## 2021-05-30 ENCOUNTER — HEALTH MAINTENANCE LETTER (OUTPATIENT)
Age: 41
End: 2021-05-30

## 2021-06-13 NOTE — TELEPHONE ENCOUNTER
Coronavirus (COVID-19) Notification    Reason for call  Notify of POSITIVE  COVID-19 lab result, assess symptoms,  review [facility] recommendations    Lab Result   Lab test for 2019-nCoV rRt-PCR or SARS-COV-2 PCR  Oropharyngeal AND/OR nasopharyngeal swabs were POSITIVE for 2019-nCoV RNA [OR] SARS-COV-2 RNA (COVID-19) RNA     We have been unable to reach Patient by phone at this time to notify of their Positive COVID-19 result.  Left voicemail message requesting a call back to [phone] [facility] for results.        POSITIVE COVID-19 Letter sent.    [Name]  Maurilio Aguilar RN  [facility]  COVID19 Results Team RN  Ph# [phone]

## 2021-06-13 NOTE — TELEPHONE ENCOUNTER
"Coronavirus (COVID-19) Notification    Caller Name (Patient, parent, daughter/son, grandparent, etc)  April    Reason for call  Notify of Positive Coronavirus (COVID-19) lab results, assess symptoms,  review [facility] [place] recommendations    Lab Result    Lab test:  2019-nCoV rRt-PCR or SARS-CoV-2 PCR    Oropharyngeal AND/OR nasopharyngeal swabs is POSITIVE for 2019-nCoV RNA/SARS-COV-2 PCR (COVID-19 virus)    RN Recommendations/Instructions per [facility] Coronavirus COVID-19 recommendations    Brief introduction script  Introduce self and then review script:  \"I am calling on behalf of [employer].  We were notified that your Coronavirus test (COVID-19) for was POSITIVE for the virus.  I have some information to relay to you but first I wanted to mention that the [place] Dept of Health will be contacting you shortly [it's possible MD already called Patient] to talk to you more about how you are feeling and other people you have had contact with who might now also have the virus.  Also, [facility] [place] is Partnering with the [facility] for Covid-19 research, you may be contacted directly by research staff.\"    ssessment (Inquire about Patient's current symptoms)   Assessment   Current Symptoms at time of phone call: (if no symptoms, document No symptoms] Loss of taste and smell, regained that. Mild congestion.   Symptom onset (if applicable) 11- fatigue continues     If at time of call, Patients symptoms hare worsened, the Patient should contact 911 or have someone drive them to Emergency Dept promptly:      If Patient calling 911, inform 911 personal that you have tested positive for the Coronavirus (COVID-19).  Place mask on and await 911 to arrive.    If Emergency Dept, If possible, please have another adult drive you to the Emergency Dept but you need to wear mask when in contact with other people.      Review information with Patient    Your result was positive. This means you have " COVID-19 (coronavirus).  We have sent you a letter that reviews the information that I'll be reviewing with you now.    How can I protect others?    If you have symptoms: stay home and away from others (self-isolate) until:    You've had no fever--and no medicine that reduces fever--for 1 full day (24 hours). And      Your other symptoms have gotten better. For example, your cough or breathing has improved. And     At least 10 days have passed since your symptoms started. (If you ve been told by a doctor that you have a weak immune system, wait 20 days.)     If you don't have symptoms: Stay home and away from others (self-isolate) until at least 10 days have passed since your first positive COVID-19 test. (Date test collected).    During this time:    Stay in your own room, including for meals. Use your own bathroom if you can.    Stay away from others in your home. No hugging, kissing or shaking hands. No visitors.     Don't go to work, school or anywhere else.     Clean  high touch  surfaces often (doorknobs, counters, handles, etc.). Use a household cleaning spray or wipes. You'll find a full list on the EPA website at www.epa.gov/pesticide-registration/list-n-disinfectants-use-against-sars-cov-2.     Cover your mouth and nose with a mask, tissue or other face covering to avoid spreading germs.    Wash your hands and face often with soap and water.    Caregivers in these groups are at risk for severe illness due to COVID-19:  o People 65 years and older  o People who live in a nursing home or long-term care facility  o People with chronic disease (lung, heart, cancer, diabetes, kidney, liver, immunologic)  o People who have a weakened immune system, including those who:  - Are in cancer treatment  - Take medicine that weakens the immune system, such as corticosteroids  - Had a bone marrow or organ transplant  - Have an immune deficiency  - Have poorly controlled HIV or AIDS  - Are obese (body mass index of 40 or  higher)  - Smoke regularly    Caregivers should wear gloves while washing dishes, handling laundry and cleaning bedrooms and bathrooms.    Wash and dry laundry with special caution. Don't shake dirty laundry, and use the warmest water setting you can.    If you have a weakened immune system, ask your doctor about other actions you should take.    For more tips, go to www.cdc.gov/coronavirus/2019-ncov/downloads/10Things.pdf.    You should not go back to work until you meet the guidelines above for ending your home isolation. You don't need to be retested for COVID-19 before going back to work--studies show that you won't spread the virus if it's been at least 10 days since your symptoms started (or 20 days, if you have a weak immune system).    Employers: This document serves as formal notice of your employee's medical guidelines for going back to work. They must meet the above guidelines before going back to work in person.    How can I take care of myself?    1. Get lots of rest. Drink extra fluids (unless a doctor has told you not to).    2. Take Tylenol (acetaminophen) for fever or pain. If you have liver or kidney problems, ask your family doctor if it's okay to take Tylenol.     Take either:     650 mg (two 325 mg pills) every 4 to 6 hours, or     1,000 mg (two 500 mg pills) every 8 hours as needed.     Note: Don't take more than 3,000 mg in one day. Acetaminophen is found in many medicines (both prescribed and over-the-counter medicines). Read all labels to be sure you don't take too much.    For children, check the Tylenol bottle for the right dose (based on their age or weight).    3. If you have other health problems (like cancer, heart failure, an organ transplant or severe kidney disease): Call your specialty clinic if you don't feel better in the next 2 days.    4. Know when to call 911: Emergency warning signs include:    Trouble breathing or shortness of breath    Pain or pressure in the chest that  doesn't go away    Feeling confused like you haven't felt before, or not being able to wake up    Bluish-colored lips or face    5. Sign up for [employer]. We know it's scary to hear that you have COVID-19. We want to track your symptoms to make sure you're okay over the next 2 weeks. Please look for an email from [employer]--this is a free, online program that we'll use to keep in touch. To sign up, follow the link in the email. Learn more at [email or web address].    Where can I get more information?    [facility]: [email or web address]    Coronavirus Basics: www.health.[facility].mn./diseases/coronavirus/basics.html    What to Do If You're Sick: www.cdc.gov/coronavirus/2019-ncov/about/steps-when-sick.html    Ending Home Isolation: www.cdc.gov/coronavirus/2019-ncov/hcp/disposition-in-home-patients.html     Caring for Someone with COVID-19: www.cdc.gov/coronavirus/2019-ncov/if-you-are-sick/care-for-someone.html     [facility] clinical trials (COVID-19 research studies): clinicalaffairs.[facility]-clinical-trials     A Positive COVID-19 letter will be sent via [employer] or the Mail.  (Exception, no letters sent to Presurgerical/Preprocedure Patients)    [Name]  Vandana Zelaya RN  [facility]

## 2021-08-23 NOTE — TELEPHONE ENCOUNTER
Impression: Other vitreous opacities, bilateral: H43.393. Plan: Discussed PVD accounts for pt's complaint. There is no evidence of retinal pathology. All signs and risks of retinal detachment or tears were discussed in detail. If pt. notices any symptoms discussed, contact office ASAP. Recommend pt. return for normal recall. Mac OCT done today. Requested Prescriptions   Pending Prescriptions Disp Refills     metFORMIN (GLUCOPHAGE-XR) 500 MG 24 hr tablet [Pharmacy Med Name: METFORMIN ER 500MG 24HR TABS] 180 tablet 0     Sig: TAKE 2 TABLETS BY MOUTH DAILY WITH A MEAL    Biguanide Agents Failed    12/22/2017  2:11 PM       Failed - Recent (12 mos) or future (30 days) visit with authorizing provider's specialty     Patient had office visit in the last year or has a visit in the next 30 days with authorizing provider.  See chart review.              Passed - Patient's BP is less than 140/90    BP Readings from Last 3 Encounters:   08/24/17 136/88   02/23/16 143/90   01/25/16 (!) 135/92                Passed - Patient is age 10 or older       Passed - Patient does NOT have a diagnosis of CHF.       Passed - Patient is not pregnant       Passed - Patient has not had a positive pregnancy test within the past 12 mos.

## 2021-09-02 ENCOUNTER — OFFICE VISIT (OUTPATIENT)
Dept: OPHTHALMOLOGY | Facility: CLINIC | Age: 41
End: 2021-09-02
Attending: OPHTHALMOLOGY
Payer: COMMERCIAL

## 2021-09-02 DIAGNOSIS — H35.359 CYSTOID MACULAR EDEMA, UNSPECIFIED LATERALITY: ICD-10-CM

## 2021-09-02 DIAGNOSIS — H35.359 CYSTOID MACULAR EDEMA, UNSPECIFIED LATERALITY: Primary | ICD-10-CM

## 2021-09-02 PROCEDURE — G0463 HOSPITAL OUTPT CLINIC VISIT: HCPCS

## 2021-09-02 PROCEDURE — 92134 CPTRZ OPH DX IMG PST SGM RTA: CPT | Performed by: OPHTHALMOLOGY

## 2021-09-02 PROCEDURE — 99214 OFFICE O/P EST MOD 30 MIN: CPT | Performed by: OPHTHALMOLOGY

## 2021-09-02 PROCEDURE — 92081 LIMITED VISUAL FIELD XM: CPT | Performed by: OPHTHALMOLOGY

## 2021-09-02 PROCEDURE — 92015 DETERMINE REFRACTIVE STATE: CPT

## 2021-09-02 ASSESSMENT — REFRACTION_WEARINGRX
OS_AXIS: 093
OD_AXIS: 014
OS_ADD: +1.50
OD_ADD: +1.50
OD_CYLINDER: +0.25
OD_SPHERE: -4.50
OS_CYLINDER: +5.50
OS_SPHERE: -3.25
SPECS_TYPE: PAL

## 2021-09-02 ASSESSMENT — REFRACTION_MANIFEST
OD_SPHERE: -4.50
OS_CYLINDER: +5.50
OD_AXIS: 055
OD_CYLINDER: +0.25
OS_SPHERE: -3.25
OS_AXIS: 095
OD_ADD: +1.75
OS_ADD: +1.75

## 2021-09-02 ASSESSMENT — SLIT LAMP EXAM - LIDS
COMMENTS: NORMAL
COMMENTS: NORMAL

## 2021-09-02 ASSESSMENT — CONF VISUAL FIELD
OS_NORMAL: 1
OD_NORMAL: 1

## 2021-09-02 ASSESSMENT — TONOMETRY
OD_IOP_MMHG: 10
OS_IOP_MMHG: 10
IOP_METHOD: ICARE

## 2021-09-02 ASSESSMENT — VISUAL ACUITY
OD_PH_CC: 20/50
OD_CC: J2
METHOD: SNELLEN - LINEAR
OS_CC: 20/60
OD_PH_CC+: +2
OD_CC: 20/50
OS_CC: J2
CORRECTION_TYPE: GLASSES

## 2021-09-02 ASSESSMENT — CUP TO DISC RATIO
OD_RATIO: 0.2
OS_RATIO: 0.2

## 2021-09-02 ASSESSMENT — EXTERNAL EXAM - RIGHT EYE: OD_EXAM: NORMAL

## 2021-09-02 ASSESSMENT — EXTERNAL EXAM - LEFT EYE: OS_EXAM: NORMAL

## 2021-09-02 NOTE — PROGRESS NOTES
I have confirmed the patient's and reviewed Past Medical History, Past Surgical History, Social History, Family History, Problem List, Medication List and agree with Tech note.     CC: blurred vision ou, annual follow up     Interval hx: Vision is stable. Stable floaters after yag capsulotomy ~2 yrs ago but no new floater or flashes. Notes she saw her neurologist for MS ~2 yrs ago and she hasn't had any new symptoms.          HPI: Alejandra Forbes is a 40 year old female with history of intermediate uveitis and MS lesions found in MRI. She is here today for evaluation of intermediate uveitis.  She is not taking any drops now.  Received interferon for MS because she had MRI which showed many MS lesions. She was last seen ~2 yrs ago and has been symptoms free since then.      DM II is well controlled. 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 Intermediate uveitis with cystoid macular edema, left eye              - cystoid macular edema worse based on today's (9/02/2021) OCT, however no cell in vitreous               - Improved VA; not using any drop now              - previous history of ocular steroid and oral prednisone use with steroid response.               - patient does NOT want to use steroids again       2. Uveitic vs steroid responder Glaucoma               - s/p Trab OU in 2006 (Dr. Edward)              - IOP is good today each eye              - will follow with Ines Littlejohn MD      3. Epiretinal membrane LEFT EYE>RIGHT EYE              - worse, offered evaluation with Ines Littlejohn MD      3. PCIOL left eye              - s/p yag capsulotomy; stable; observe      4. PSC RIGHT EYE              - Visually significant, patient wishes to observe for now   - follow up with Ines Littlejohn MD      5. Multiple Sclerosis               - no symptoms; recommended to follow with Neurology        RTC !-2 months with Ines Mohan MD PhD.  Professor & Chair

## 2021-09-02 NOTE — NURSING NOTE
Chief Complaints and History of Present Illnesses   Patient presents with     Uveitis Follow-Up     Uveitis follow up      Chief Complaint(s) and History of Present Illness(es)     Uveitis Follow-Up     Laterality: both eyes    Frequency: constantly    Timing: throughout the day    Course: stable    Associated symptoms: Negative for eye pain, flashes, floaters, redness, discharge, glare and haloes    Pain scale: 0/10    Comments: Uveitis follow up               Comments     Uveitis follow up / Needs DMV exam with completed forms to submit to them.  Wonders if left lens was placed correctly in current PG from 3/2020 as it just 'seems off'.  Had redness in LE lasted a few days, occurred a few months ago / no VA changes or other signs/symptoms associated with it.  Pt states she is not using any art tears.  WON Murray COT 8:42 AM 09/02/2021

## 2021-09-19 ENCOUNTER — HEALTH MAINTENANCE LETTER (OUTPATIENT)
Age: 41
End: 2021-09-19

## 2021-09-22 DIAGNOSIS — E11.65 TYPE 2 DIABETES MELLITUS WITH HYPERGLYCEMIA, WITHOUT LONG-TERM CURRENT USE OF INSULIN (H): ICD-10-CM

## 2021-09-22 RX ORDER — METFORMIN HCL 500 MG
TABLET, EXTENDED RELEASE 24 HR ORAL
Qty: 120 TABLET | Refills: 0 | Status: SHIPPED | OUTPATIENT
Start: 2021-09-22 | End: 2021-10-18

## 2021-12-24 DIAGNOSIS — E11.65 TYPE 2 DIABETES MELLITUS WITH HYPERGLYCEMIA, WITHOUT LONG-TERM CURRENT USE OF INSULIN (H): ICD-10-CM

## 2021-12-28 RX ORDER — METFORMIN HCL 500 MG
TABLET, EXTENDED RELEASE 24 HR ORAL
Qty: 120 TABLET | Refills: 0 | Status: SHIPPED | OUTPATIENT
Start: 2021-12-28 | End: 2022-02-07

## 2021-12-28 NOTE — TELEPHONE ENCOUNTER
Medication is being filled for 1 time refill only due to:  Patient needs to be seen because overdue for diabetes follow up.

## 2022-01-12 DIAGNOSIS — H40.43X0 UVEITIC GLAUCOMA OF BOTH EYES, UNSPECIFIED GLAUCOMA STAGE: ICD-10-CM

## 2022-01-12 DIAGNOSIS — H20.9 UVEITIC GLAUCOMA OF BOTH EYES, UNSPECIFIED GLAUCOMA STAGE: ICD-10-CM

## 2022-01-12 DIAGNOSIS — H35.359 CYSTOID MACULAR EDEMA, UNSPECIFIED LATERALITY: Primary | ICD-10-CM

## 2022-01-28 DIAGNOSIS — E11.65 TYPE 2 DIABETES MELLITUS WITH HYPERGLYCEMIA, WITHOUT LONG-TERM CURRENT USE OF INSULIN (H): ICD-10-CM

## 2022-01-28 NOTE — TELEPHONE ENCOUNTER
One month supply sent 12/28/21.  Overdue for diabetes follow up/also needs fasting labs.    Attempt to call patient.  Mail box is full.    Candace Wong RN

## 2022-02-02 NOTE — TELEPHONE ENCOUNTER
AS,  Unable to reach pt  See below messages  No response from patient to our outreach  Please advise on further refill  Thanks,  Cherelle ADAN RN

## 2022-02-07 RX ORDER — METFORMIN HCL 500 MG
TABLET, EXTENDED RELEASE 24 HR ORAL
Qty: 120 TABLET | Refills: 0 | Status: SHIPPED | OUTPATIENT
Start: 2022-02-07 | End: 2022-03-11

## 2022-02-15 ENCOUNTER — OFFICE VISIT (OUTPATIENT)
Dept: FAMILY MEDICINE | Facility: CLINIC | Age: 42
End: 2022-02-15
Payer: COMMERCIAL

## 2022-02-15 VITALS
HEIGHT: 61 IN | TEMPERATURE: 97.3 F | DIASTOLIC BLOOD PRESSURE: 107 MMHG | OXYGEN SATURATION: 98 % | HEART RATE: 99 BPM | WEIGHT: 253.4 LBS | BODY MASS INDEX: 47.84 KG/M2 | SYSTOLIC BLOOD PRESSURE: 157 MMHG

## 2022-02-15 DIAGNOSIS — F31.81 BIPOLAR 2 DISORDER (H): ICD-10-CM

## 2022-02-15 DIAGNOSIS — I10 BENIGN ESSENTIAL HYPERTENSION: ICD-10-CM

## 2022-02-15 DIAGNOSIS — E11.65 TYPE 2 DIABETES MELLITUS WITH HYPERGLYCEMIA, WITHOUT LONG-TERM CURRENT USE OF INSULIN (H): Primary | ICD-10-CM

## 2022-02-15 DIAGNOSIS — F41.0 ANXIETY ATTACK: ICD-10-CM

## 2022-02-15 LAB — HBA1C MFR BLD: 8 % (ref 0–5.6)

## 2022-02-15 PROCEDURE — 82465 ASSAY BLD/SERUM CHOLESTEROL: CPT | Performed by: FAMILY MEDICINE

## 2022-02-15 PROCEDURE — 99215 OFFICE O/P EST HI 40 MIN: CPT | Performed by: FAMILY MEDICINE

## 2022-02-15 PROCEDURE — 82043 UR ALBUMIN QUANTITATIVE: CPT | Performed by: FAMILY MEDICINE

## 2022-02-15 PROCEDURE — 86803 HEPATITIS C AB TEST: CPT | Performed by: FAMILY MEDICINE

## 2022-02-15 PROCEDURE — 80053 COMPREHEN METABOLIC PANEL: CPT | Performed by: FAMILY MEDICINE

## 2022-02-15 PROCEDURE — 84443 ASSAY THYROID STIM HORMONE: CPT | Performed by: FAMILY MEDICINE

## 2022-02-15 PROCEDURE — 36415 COLL VENOUS BLD VENIPUNCTURE: CPT | Performed by: FAMILY MEDICINE

## 2022-02-15 PROCEDURE — 83718 ASSAY OF LIPOPROTEIN: CPT | Performed by: FAMILY MEDICINE

## 2022-02-15 PROCEDURE — 83036 HEMOGLOBIN GLYCOSYLATED A1C: CPT | Performed by: FAMILY MEDICINE

## 2022-02-15 PROCEDURE — 82607 VITAMIN B-12: CPT | Performed by: FAMILY MEDICINE

## 2022-02-15 RX ORDER — LISINOPRIL 20 MG/1
20 TABLET ORAL DAILY
Qty: 30 TABLET | Refills: 1 | Status: SHIPPED | OUTPATIENT
Start: 2022-02-15 | End: 2022-02-23

## 2022-02-15 ASSESSMENT — PATIENT HEALTH QUESTIONNAIRE - PHQ9
SUM OF ALL RESPONSES TO PHQ QUESTIONS 1-9: 8
SUM OF ALL RESPONSES TO PHQ QUESTIONS 1-9: 8
10. IF YOU CHECKED OFF ANY PROBLEMS, HOW DIFFICULT HAVE THESE PROBLEMS MADE IT FOR YOU TO DO YOUR WORK, TAKE CARE OF THINGS AT HOME, OR GET ALONG WITH OTHER PEOPLE: SOMEWHAT DIFFICULT

## 2022-02-15 ASSESSMENT — MIFFLIN-ST. JEOR: SCORE: 1732.82

## 2022-02-15 NOTE — PROGRESS NOTES
Assessment & Plan       ICD-10-CM    1. Type 2 diabetes mellitus with hyperglycemia, without long-term current use of insulin (H)  E11.65 Comprehensive metabolic panel (BMP + Alb, Alk Phos, ALT, AST, Total. Bili, TP)     Hemoglobin A1c     Cholesterol     HDL cholesterol     TSH with free T4 reflex     Albumin Random Urine Quantitative with Creat Ratio     Vitamin B12     Hepatitis C Screen Reflex to HCV RNA Quant and Genotype     lisinopril (ZESTRIL) 20 MG tablet     Med Therapy Management Referral     Lipid panel reflex to direct LDL Fasting     Comprehensive metabolic panel (BMP + Alb, Alk Phos, ALT, AST, Total. Bili, TP)     Hemoglobin A1c     Cholesterol     HDL cholesterol     TSH with free T4 reflex     Albumin Random Urine Quantitative with Creat Ratio     Vitamin B12     Hepatitis C Screen Reflex to HCV RNA Quant and Genotype   2. Benign essential hypertension  I10 lisinopril (ZESTRIL) 20 MG tablet     Med Therapy Management Referral     Basic metabolic panel  (Ca, Cl, CO2, Creat, Gluc, K, Na, BUN)   3. Bipolar 2 disorder (HCC)  F31.81 Adult Mental Health  Referral     Med Therapy Management Referral   4. Anxiety attack  F41.0 Adult Mental Health  Referral     Med Therapy Management Referral      43yo female usually seen by Dr. Clemons, but known to me from before 2008.  Hasn't been seen in clinic since '19, the last time labs were done.  Noted increased glucose levels at home the last few months and has been generally been feeling 'off'.  Too many issues to address well today, but put the focus on DM II and HTN, and will refer her to psychiatry for anxiety/bipolar management.    --DM II- Will check labs as above, and recommended she see ARTURO Noble for medication management options for DM II and DM diet/weight/HTN questions and follow-up.  --Reviewed A1C of 8.0 after visit- up from 6's.  On max metformin, could definitely benefit from discussion of newer treatment options with  "MTM.    --HTN- BP quite high today, and on recheck.    Rec getting home BP monitor to follow there as well.    Discussed options, will start lisinopril 10mg/d.  Labs as above.  Risks and benefits of medication(s) including potential side effects reviewed with patient.  Questions answered.   Rec f/u in 3-4 weeks with either MD or MTM.    Can be virtual if works much better for pt, but would like to check BMP/lipids prior to visit if virtual visit.        48 minutes spent on the date of the encounter doing chart review, review of test results, interpretation of tests, patient visit, documentation and discussion with family        BMI:   Estimated body mass index is 48.27 kg/m  as calculated from the following:    Height as of this encounter: 1.543 m (5' 0.75\").    Weight as of this encounter: 114.9 kg (253 lb 6.4 oz).   Weight management plan: Discussed healthy diet and exercise guidelines        Return in about 3 weeks (around 3/8/2022) for MTM follow-up for HTN and DM II, med review, diet ?s.    Mariella Dunn MD  [facility]          Subjective April is a 42 year old who presents for the following health issues - DM II, HTN, anxiety, other issues, here with her  today    History of Present Illness       Diabetes:   She presents for follow up of diabetes.  She is checking home blood glucose two times daily. She checks blood glucose after meals.  Blood glucose is sometimes over 200 and never under 70. When her blood glucose is low, the patient is asymptomatic for confusion, blurred vision, lethargy and reports not feeling dizzy, shaky, or weak.  She is concerned about blood sugar frequently over 200. She is having excessive thirst and weight gain.         She eats 2-3 servings of fruits and vegetables daily.She consumes 0 sweetened beverage(s) daily.She exercises with enough effort to increase her heart rate 9 or less minutes per day.  She exercises with enough effort to increase " her heart rate 3 or less days per week.   She is taking medications regularly.     Usually sees Dr. Clemons, but hasn't been seen in clinic for awhile.    Last in office with labs in ~7/19, some virtual visits since then, no labs.  She has continued on 2g/d metformin-   Checking blood sugars at home.  Trending up the last few months.  This am fasting - 189.  211 after a meal.    Has gained a lot of weight during COVID, then lost.  40-50 lb fluctuation in the past year.  Up ~25 lbs from 7/19.  Still swimming ~3x/wk, trying to work on diet, but not able to lose like she has in the past.     Feeling more anxious than she has in the last couple yrs.  Electrical impulses coming through her body.  Also feeling much warmer than usual, had to turn down heat in house.  No loose stools or skin changes.    Has possible dx of bipolar, and the anxiety.  Has done therapy, has good coping skills, but needs meds at this point- interested in psychiatry referral.    Stress from work has been very intense. 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    Periods- have actually been a bit more regular.  Has h/o PCOS, orginally on metformin for that (then was on prednisone for eye issues, MS issues).  Wonders if some of her issues could be from perimenopause/hormone issues.    Patient Active Problem List   Diagnosis     Encounter for other general counseling or advice on contraception     Polycystic ovaries     Pars planitis     CARDIOVASCULAR SCREENING; LDL GOAL LESS THAN 160     Abnormal MRI of head     Multiple sclerosis (H)     Posterior subcapsular polar cataract, nonsenile     CME (cystoid macular edema)     PVD (posterior vitreous detachment), left eye     Bipolar 2 disorder (HCC)     Anxiety attack     Morbid obesity (H)     Type 2 diabetes mellitus with hyperglycemia, without long-term current use of insulin (H)     Mild intermittent asthma without complication     Benign essential  "hypertension     Family planning, BCP (birth control pills) maintenance        Answers for HPI/ROS submitted by the patient on 2/15/2022  If you checked off any problems, how difficult have these problems made it for you to do your work, take care of things at home, or get along with other people?: Somewhat difficult  PHQ9 TOTAL SCORE: 8        Review of Systems   Constitutional, HEENT, cardiovascular, pulmonary, gi and gu systems are negative, except as otherwise noted.        Objective    BP (!) 157/107   Pulse 99   Temp 97.3  F (36.3  C)   Ht 1.543 m (5' 0.75\")   Wt 114.9 kg (253 lb 6.4 oz)   SpO2 98%   BMI 48.27 kg/m    Body mass index is 48.27 kg/m .   MD BP recheck similar to initial BP  Physical Exam   GENERAL APPEARANCE: healthy, alert and no distress     EYES: PERRL, sclera clear     HENT: nose and mouth without ulcers or lesions     NECK: no adenopathy, no asymmetry, masses, or scars and thyroid normal to palpation     RESP: lungs clear to auscultation - no rales, rhonchi or wheezes     CV: regular rates and rhythm, normal S1 S2, no S3 or S4 and no murmur, click or rub      Abdomen: soft, nontender, no HSM or masses and bowel sounds normal     Ext: warm, dry, no edema      Psych: full range affect but slightly anxious, normal speech and grooming, judgement and insight intact     Results for orders placed or performed in visit on 02/15/22 (from the past 24 hour(s))   Hemoglobin A1c   Result Value Ref Range    Hemoglobin A1C 8.0 (H) 0.0 - 5.6 %               "

## 2022-02-16 LAB
ALBUMIN SERPL-MCNC: 3.2 G/DL (ref 3.4–5)
ALP SERPL-CCNC: 94 U/L (ref 40–150)
ALT SERPL W P-5'-P-CCNC: 22 U/L (ref 0–50)
ANION GAP SERPL CALCULATED.3IONS-SCNC: 12 MMOL/L (ref 3–14)
AST SERPL W P-5'-P-CCNC: 10 U/L (ref 0–45)
BILIRUB SERPL-MCNC: 0.4 MG/DL (ref 0.2–1.3)
BUN SERPL-MCNC: 13 MG/DL (ref 7–30)
CALCIUM SERPL-MCNC: 8.4 MG/DL (ref 8.5–10.1)
CHLORIDE BLD-SCNC: 104 MMOL/L (ref 94–109)
CHOLEST SERPL-MCNC: 200 MG/DL
CO2 SERPL-SCNC: 19 MMOL/L (ref 20–32)
CREAT SERPL-MCNC: 0.57 MG/DL (ref 0.52–1.04)
CREAT UR-MCNC: 38 MG/DL
GFR SERPL CREATININE-BSD FRML MDRD: >90 ML/MIN/1.73M2
GLUCOSE BLD-MCNC: 172 MG/DL (ref 70–99)
HCV AB SERPL QL IA: NONREACTIVE
HDLC SERPL-MCNC: 59 MG/DL
MICROALBUMIN UR-MCNC: 12 MG/L
MICROALBUMIN/CREAT UR: 31.58 MG/G CR (ref 0–25)
POTASSIUM BLD-SCNC: 4 MMOL/L (ref 3.4–5.3)
PROT SERPL-MCNC: 7.4 G/DL (ref 6.8–8.8)
SODIUM SERPL-SCNC: 135 MMOL/L (ref 133–144)
TSH SERPL DL<=0.005 MIU/L-ACNC: 1.38 MU/L (ref 0.4–4)
VIT B12 SERPL-MCNC: 367 PG/ML (ref 193–986)

## 2022-02-16 ASSESSMENT — ASTHMA QUESTIONNAIRES
ACT_TOTALSCORE: 25
QUESTION_2 LAST FOUR WEEKS HOW OFTEN HAVE YOU HAD SHORTNESS OF BREATH: NOT AT ALL
QUESTION_1 LAST FOUR WEEKS HOW MUCH OF THE TIME DID YOUR ASTHMA KEEP YOU FROM GETTING AS MUCH DONE AT WORK, SCHOOL OR AT HOME: NONE OF THE TIME
QUESTION_3 LAST FOUR WEEKS HOW OFTEN DID YOUR ASTHMA SYMPTOMS (WHEEZING, COUGHING, SHORTNESS OF BREATH, CHEST TIGHTNESS OR PAIN) WAKE YOU UP AT NIGHT OR EARLIER THAN USUAL IN THE MORNING: NOT AT ALL
QUESTION_4 LAST FOUR WEEKS HOW OFTEN HAVE YOU USED YOUR RESCUE INHALER OR NEBULIZER MEDICATION (SUCH AS ALBUTEROL): NOT AT ALL
QUESTION_5 LAST FOUR WEEKS HOW WOULD YOU RATE YOUR ASTHMA CONTROL: COMPLETELY CONTROLLED
ACT_TOTALSCORE: 25

## 2022-02-17 ENCOUNTER — TELEPHONE (OUTPATIENT)
Dept: FAMILY MEDICINE | Facility: CLINIC | Age: 42
End: 2022-02-17
Payer: COMMERCIAL

## 2022-02-17 NOTE — TELEPHONE ENCOUNTER
MTM referral from: [facility] visit (referral by provider)    MTM referral outreach attempt #2 on February 17, 2022 at 2:54 PM      Outcome: Patient not reachable after several attempts, will route to MTM Pharmacist/Provider as an FYI.  MT scheduling number is [phone].  Thank you for the referral.    Santi Peng, MTM coordinator

## 2022-02-21 ENCOUNTER — MYC MEDICAL ADVICE (OUTPATIENT)
Dept: FAMILY MEDICINE | Facility: CLINIC | Age: 42
End: 2022-02-21
Payer: COMMERCIAL

## 2022-02-21 NOTE — TELEPHONE ENCOUNTER
A.S.    Please see CLEAR message.  Patient has virtual visit scheduled with you this Wednesday 2/23.    Thanks,  Candace Wong RN

## 2022-02-22 NOTE — PROGRESS NOTES
April is a 42 year old who is being evaluated via a billable video visit.      How would you like to obtain your AVS? MyChart  If the video visit is dropped, the invitation should be resent by: Text to cell phone: [phone]   Will anyone else be joining your video visit? No    Video Start Time: 9:02 AM    Assessment & Plan   Type 2 diabetes mellitus with hyperglycemia, without long-term current use of insulin (H)  Obesity- weight gain   Plan: Diabetes  Educator consultation- patient declined- previously poor experience.  Counseled to strongly consider MTM consultation excellent support for medications & insurance coverage    Start GLP-1to help with Diabetes  And weight loss   - liraglutide (VICTOZA) 18 MG/3ML solution; Inject 0.6 mg Subcutaneous daily  Dispense: 3 mL; Refill: 0    - lisinopril (ZESTRIL) 20 MG tablet; Take 1 tablet (20 mg) by mouth daily  Dispense: 90 tablet; Refill: 1    Lab only appointment on Monday for fasting lipid      - atorvastatin (LIPITOR) 20 MG tablet; Take 1 tablet (20 mg) by mouth daily  Dispense: 90 tablet; Refill: 3    - Blood Glucose Monitoring Suppl (CONTOUR NEXT ONE) KIT; 1 each once for 1 dose  Dispense: 1 kit; Refill: 0  - CONTOUR NEXT TEST test strip; Use to test blood sugar twice daily  times daily.  Dispense: 100 strip; Refill: 4  - alcohol swab prep pads; Use to swab area of injection/[place] as directed.  Dispense: 100 each; Refill: 11  Need office visit for complete physical as well  Overdue for foot exam/      2. Multiple sclerosis (H)  Reports no acute symptoms  No plans to follow neurology- last seen 2018.  Declined interferon      3. Benign essential hypertension  Controlled Blood pressure on home cuff   Continue same  - lisinopril (ZESTRIL) 20 MG tablet; Take 1 tablet (20 mg) by mouth daily  Dispense: 90 tablet; Refill: 1        Ordering of each unique test  Prescription drug management  46 minutes spent on the date of the encounter doing chart review, history and  exam, documentation and further activities per the note       Depression Screening Follow Up    PHQ 2/23/2022   PHQ-9 Total Score 10   Q9: Thoughts of better off dead/self-harm past 2 weeks Not at all     Last PHQ-9 2/23/2022   1.  Little interest or pleasure in doing things 2   2.  Feeling down, depressed, or hopeless 2   3.  Trouble falling or staying asleep, or sleeping too much 2   4.  Feeling tired or having little energy 2   5.  Poor appetite or overeating 2   6.  Feeling bad about yourself 0   7.  Trouble concentrating 0   8.  Moving slowly or restless 0   Q9: Thoughts of better off dead/self-harm past 2 weeks 0   PHQ-9 Total Score 10       Follow Up Actions Taken  Crisis resource information provided in After Visit Summary  Patient declined referral.     See Patient Instructions    Return in about 5 days (around 2/28/2022) for lab only appointment.    Shivani Clemons MD  [facility] is a 42 year old who presents for the following health issues     History of Present Illness       Hypertension: She presents for follow up of hypertension.  She does check blood pressure  regularly outside of the clinic. Outpatient blood pressures have not been over 140/90. She does not follow a low salt diet. She consumes 0 sweetened beverage(s) daily. She exercises with enough effort to increase her heart rate 3 or less days per week.   She is taking medications regularly.     Answers for HPI/ROS submitted by the patient on 2/23/2022  PHQ9 TOTAL SCORE: 10  Wt Readings from Last 5 Encounters:   02/15/22 114.9 kg (253 lb 6.4 oz)   07/18/19 103.4 kg (228 lb)   12/08/18 102.1 kg (225 lb)   10/01/18 102.1 kg (225 lb)   07/11/18 104.1 kg (229 lb 6.4 oz)         Known history of Diabetes  , since - 2015  Poor control 2021  Has been on metformin.  Weight gain- unsure why.  Not able to check blood sugar - no glucometer. Requesting nw    lisinopril now at 20mg   Blood pressure has come  down.    History of MS-2018  Noted shifted vision in left eye  Genaro De Jesus MD 09/2021  Intermediate uveitis with cystoid macular edema, left eye.   Uveitic vs steroid responder Glaucoma               - s/p Trab OU in 2006 (Dr. Edward)              - IOP is good today each eye              - will follow with Ines Littlejohn MD    Review of Systems   Constitutional, HEENT, cardiovascular, pulmonary, GI, , musculoskeletal, neuro, skin, endocrine and psych systems are negative, except as otherwise noted.      Objective    Vitals - Patient Reported  Systolic (Patient Reported): 1.36      Vitals:  No vitals were obtained today due to virtual visit.    Physical Exam   GENERAL: Healthy, alert and no distress  EYES: Eyes grossly normal to inspection.  No discharge or erythema, or obvious scleral/conjunctival abnormalities.  RESP: No audible wheeze, cough, or visible cyanosis.  No visible retractions or increased work of breathing.    SKIN: Visible skin clear. No significant rash, abnormal pigmentation or lesions.  NEURO: Cranial nerves grossly intact.  Mentation and speech appropriate for age.  PSYCH: Mentation appears normal, affect normal/bright, judgement and insight intact, normal speech and appearance well-groomed.    No results found for any visits on 02/23/22.            Video-Visit Details    Type of service:  Video Visit    Video End Time:9:37 AM    Originating Location (pt. Location): Home    Distant Location (provider location):  [facility]     Platform used for Video Visit: TEAM INTERVAL

## 2022-02-23 ENCOUNTER — VIRTUAL VISIT (OUTPATIENT)
Dept: FAMILY MEDICINE | Facility: CLINIC | Age: 42
End: 2022-02-23
Payer: COMMERCIAL

## 2022-02-23 ENCOUNTER — TELEPHONE (OUTPATIENT)
Dept: FAMILY MEDICINE | Facility: CLINIC | Age: 42
End: 2022-02-23

## 2022-02-23 DIAGNOSIS — E11.65 TYPE 2 DIABETES MELLITUS WITH HYPERGLYCEMIA, WITHOUT LONG-TERM CURRENT USE OF INSULIN (H): Primary | ICD-10-CM

## 2022-02-23 DIAGNOSIS — I10 BENIGN ESSENTIAL HYPERTENSION: ICD-10-CM

## 2022-02-23 DIAGNOSIS — E66.01 MORBID OBESITY (H): ICD-10-CM

## 2022-02-23 DIAGNOSIS — G35 MULTIPLE SCLEROSIS (H): ICD-10-CM

## 2022-02-23 PROCEDURE — 99214 OFFICE O/P EST MOD 30 MIN: CPT | Mod: 95 | Performed by: FAMILY MEDICINE

## 2022-02-23 RX ORDER — ATORVASTATIN CALCIUM 20 MG/1
20 TABLET, FILM COATED ORAL DAILY
Qty: 90 TABLET | Refills: 3 | Status: SHIPPED | OUTPATIENT
Start: 2022-02-23 | End: 2023-03-07

## 2022-02-23 RX ORDER — BLOOD-GLUCOSE METER
1 EACH MISCELLANEOUS ONCE
Qty: 1 KIT | Refills: 0 | Status: SHIPPED | OUTPATIENT
Start: 2022-02-23 | End: 2022-02-23

## 2022-02-23 RX ORDER — LISINOPRIL 20 MG/1
20 TABLET ORAL DAILY
Qty: 90 TABLET | Refills: 1 | Status: SHIPPED | OUTPATIENT
Start: 2022-02-23 | End: 2022-04-07

## 2022-02-23 RX ORDER — GLUCOSAMINE HCL/CHONDROITIN SU 500-400 MG
CAPSULE ORAL
Qty: 100 EACH | Refills: 11 | Status: SHIPPED | OUTPATIENT
Start: 2022-02-23 | End: 2023-11-07

## 2022-02-23 RX ORDER — LIRAGLUTIDE 6 MG/ML
0.6 INJECTION SUBCUTANEOUS DAILY
Qty: 3 ML | Refills: 0 | Status: SHIPPED | OUTPATIENT
Start: 2022-02-23 | End: 2022-03-03 | Stop reason: ALTCHOICE

## 2022-02-23 ASSESSMENT — PATIENT HEALTH QUESTIONNAIRE - PHQ9
SUM OF ALL RESPONSES TO PHQ QUESTIONS 1-9: 10
SUM OF ALL RESPONSES TO PHQ QUESTIONS 1-9: 10

## 2022-02-23 NOTE — TELEPHONE ENCOUNTER
Called and left nondetailed vm for patient to schedule lab appointment on 2/28/22 by calling [facility] # [phone] or schedule through [employer].     Doris MORTON

## 2022-02-26 ENCOUNTER — MYC MEDICAL ADVICE (OUTPATIENT)
Dept: FAMILY MEDICINE | Facility: CLINIC | Age: 42
End: 2022-02-26
Payer: COMMERCIAL

## 2022-02-28 ENCOUNTER — LAB (OUTPATIENT)
Dept: LAB | Facility: CLINIC | Age: 42
End: 2022-02-28
Payer: COMMERCIAL

## 2022-02-28 DIAGNOSIS — I10 BENIGN ESSENTIAL HYPERTENSION: ICD-10-CM

## 2022-02-28 DIAGNOSIS — E11.65 TYPE 2 DIABETES MELLITUS WITH HYPERGLYCEMIA, WITHOUT LONG-TERM CURRENT USE OF INSULIN (H): ICD-10-CM

## 2022-02-28 LAB
ANION GAP SERPL CALCULATED.3IONS-SCNC: 9 MMOL/L (ref 3–14)
BUN SERPL-MCNC: 14 MG/DL (ref 7–30)
CALCIUM SERPL-MCNC: 8.8 MG/DL (ref 8.5–10.1)
CHLORIDE BLD-SCNC: 104 MMOL/L (ref 94–109)
CHOLEST SERPL-MCNC: 139 MG/DL
CO2 SERPL-SCNC: 21 MMOL/L (ref 20–32)
CREAT SERPL-MCNC: 0.63 MG/DL (ref 0.52–1.04)
FASTING STATUS PATIENT QL REPORTED: YES
GFR SERPL CREATININE-BSD FRML MDRD: >90 ML/MIN/1.73M2
GLUCOSE BLD-MCNC: 188 MG/DL (ref 70–99)
HDLC SERPL-MCNC: 43 MG/DL
LDLC SERPL CALC-MCNC: 69 MG/DL
NONHDLC SERPL-MCNC: 96 MG/DL
POTASSIUM BLD-SCNC: 4.5 MMOL/L (ref 3.4–5.3)
SODIUM SERPL-SCNC: 134 MMOL/L (ref 133–144)
TRIGL SERPL-MCNC: 136 MG/DL

## 2022-02-28 PROCEDURE — 36415 COLL VENOUS BLD VENIPUNCTURE: CPT

## 2022-02-28 PROCEDURE — 80048 BASIC METABOLIC PNL TOTAL CA: CPT

## 2022-02-28 PROCEDURE — 80061 LIPID PANEL: CPT

## 2022-03-01 DIAGNOSIS — E11.65 TYPE 2 DIABETES MELLITUS WITH HYPERGLYCEMIA, WITHOUT LONG-TERM CURRENT USE OF INSULIN (H): Primary | ICD-10-CM

## 2022-03-01 NOTE — TELEPHONE ENCOUNTER
A.S  Pharmacy requesting new Rx for pen needles to be used for the Victoza.    Pended new Rx.    Please approve if appropriate  Jyotsna Vora RN

## 2022-03-01 NOTE — TELEPHONE ENCOUNTER
Reason for Call:  Medication or medication refill:    Do you use a [facility]? No    Name of the pharmacy and phone number for the current request:  [id number] [place] [facility] [address] [place], [place] - [facility]  Phone [phone] fax [phone]  Fax from pharmacy    Name of the medication requested:  Pen needles for victoza     Other request:        Call taken on 3/1/2022 at 7:13 AM by Mikki Small

## 2022-03-02 NOTE — PROGRESS NOTES
Medication Therapy Management (MTM) Encounter    ASSESSMENT:                            Medication Adherence/Access: No issues identified    Type 2 Diabetes: Stable. Patient is not meeting A1c goal of < 6.5%. Self monitoring of blood glucose is not at goal of fasting <110 mg/dL and post prandial < 150 mg/dL. However, expect her sugars to continue to improve with addition of Victoza. Patient may benefit from increasing Victoza to maintenance dose of 1.2 mg daily or switching to Ozempic. After visit, found that Ozempic and Trulicity are tier 2 medications and Victoza is not covered with April's insurance.     Hypertension: Stable. Patient is meeting blood pressure goal of < 130/80mmHg. Advised patient to monitor for low blood pressure and dizziness. Could consider cutting lisinopril dose down in future if blood pressure remains low.    Hyperlipidemia: Stable. Patient is on moderate intensity statin which is indicated based on 2019 ACC/AHA guidelines for lipid management.      Headache: Stable. Given that she uses ibuprofen so infrequently, less concern with use in combination with lisinopril, but something to be cautious of.    Birth Control: Stable.     Asthma: Stable.     Supplements: Stable.    PLAN:                            1. I was wrong about Victoza having a coupon, it looks like they must have discontinued it recently. But I have good news! After our visit, I looked further into your insurance formulary and found that another medication in the same class as Victoza may actually be more affordable for you. This medication is called Ozempic (and is only administered once a week instead of once a day). I am going to try sending into the pharmacy and see if it is covered. We may have to wait a few weeks because you just picked up Victoza to see what happens.   The other good thing is that if there is a cost with the Ozempic, there is a coupon that can help bring it down. Here it is:  https://www.[employer]/savings-and-resources/save-on-ozempic.html    2. I have sent in a prescription for the continuous glucose monitor called FreeStyle Kay 2 sensor. I will send a link to your email at [email or web address] to have you share your sugar readings with me.    3. Since you are not currently having any symptoms of low B12 levels (tingling in hands or feet) and not completely deficient in B12, we should just monitor for now. In the future if you ever have any tingling in your hands or feet, we could have you start vitamin B12.     Follow-up: Will check in on Ozempic coverage in a few weeks and send you message though [employer]    SUBJECTIVE/OBJECTIVE:                          Alejandra Forbes is a 42 year old female contacted via secure video for an initial visit. She was referred to me from Dr. Clemons.      Reason for visit: comprehensive medication review, diabetes and hypertension management.    Allergies/ADRs: Reviewed in chart  Past Medical History: Reviewed in chart  Tobacco: She reports that she has never smoked. She has never used smokeless tobacco.  Alcohol: 1-3 beverages / week  Caffeine: 3 - 4 cups green tea per day   Work: director at [employer]     Medication Adherence/Access: no issues reported. April mentions that Victoza was a little expensive when she picked it up.     Type 2 Diabetes:  Currently taking Victoza 0.6 mg daily (started on Monday) and metformin XR 1000 mg twice daily. Patient is experiencing the following side effects: nausea with Victoza (finds it bearable). Loose stools with metformin, but has improved in the last few weeks.  Patient reports that she has been taking metformin for a long time. Denies tingling in extremities.  Blood sugar monitorin-2 time(s) daily. Ranges (patient reported):   Fastin mg/dL (this morning); before Victoza they were 160 - 180 mg/dL  PP (2 hours): last week her numbers were 140 - 180 mg/dL  Symptoms of low blood sugar? none  Symptoms  "of high blood sugar? None - felt dehydrated before but this has improved   Eye exam: up to date   Foot exam: due - scheduled for a physical   Exercise: Enjoys swimming (2+ times/week)    Hypertension: Current medications include lisinopril 20 mg daily. Patient does self-monitor blood pressure: 102/66 mmHg, pulse 70s (this morning). Systolic readings over the last week were 110 - 120s. Patient reports no dizziness, but felt a little lightheaded this morning. She states she was feeling \"very icky\" when her blood pressure was high when she was last in clinic, but is feeling much better now.     Hyperlipidemia: Current therapy includes atorvastatin 20 mg daily.  Patient reports no significant myalgias or other side effects.    Headache: Currently taking ibuprofen 400 - 600 mg daily as needed for a headache (once in a great while). No issues reported.     Birth Control: Current medications include low-ogestrel daily. No issues reported.     Asthma: Current medications: Short-Acting Bronchodilator: Albuterol MDI.    Patient reports that she may have exercise induced asthma, but hasn't needed albuterol since 2020 when she had Covid.  Patient questions if she really even has asthma. No reported SOB.     Supplements: Currently taking vitamin D daily. No reported issues at this time.   ----------------  I spent 56 minutes with this patient today. All changes were made via collaborative practice agreement with Shivani Clemons MD. A copy of the visit note was provided to the patient's provider(s).    The patient was sent via [employer] a summary of these recommendations.     Zoya Page, DamienD   Pharmaceutical Care Resident   Medication Therapy Management    Preceptor cosignature: Alejandra Forbes was seen independently by Dr. Page. I have reviewed the assessment and plan. Cherelle Almanzar, [place], [place], BCACP    Telemedicine Visit Details  Type of service:  Video Conference via [employer]  Start Time: 8:34 AM  End Time: " 9:30 AM  Originating Location (patient location): Home  Distant Location (provider location):  [facility] [place]     Medication Therapy Recommendations  Type 2 diabetes mellitus with hyperglycemia, without long-term current use of insulin (H)    Current Medication: liraglutide (VICTOZA) 18 MG/3ML solution (Discontinued)   Rationale: More cost-effective medication available - Cost - Adherence   Recommendation: Change Medication - Ozempic (0.25 or 0.5 MG/DOSE) 2 MG/1.5ML Sopn   Status: Accepted per CPA          Current Medication: metFORMIN (GLUCOPHAGE-XR) 500 MG 24 hr tablet   Rationale: Medication requires monitoring - Needs additional monitoring   Recommendation: Self-Monitoring - FreeStyle Kay 2 Sensor Misc   Status: Accepted per CPA

## 2022-03-03 ENCOUNTER — VIRTUAL VISIT (OUTPATIENT)
Dept: PHARMACY | Facility: CLINIC | Age: 42
End: 2022-03-03
Payer: COMMERCIAL

## 2022-03-03 DIAGNOSIS — J45.20 MILD INTERMITTENT ASTHMA WITHOUT COMPLICATION: ICD-10-CM

## 2022-03-03 DIAGNOSIS — I10 BENIGN ESSENTIAL HYPERTENSION: ICD-10-CM

## 2022-03-03 DIAGNOSIS — G44.209 TENSION HEADACHE: ICD-10-CM

## 2022-03-03 DIAGNOSIS — Z30.09 ENCOUNTER FOR OTHER GENERAL COUNSELING OR ADVICE ON CONTRACEPTION: ICD-10-CM

## 2022-03-03 DIAGNOSIS — E11.65 TYPE 2 DIABETES MELLITUS WITH HYPERGLYCEMIA, WITHOUT LONG-TERM CURRENT USE OF INSULIN (H): Primary | ICD-10-CM

## 2022-03-03 DIAGNOSIS — E78.5 HYPERLIPIDEMIA LDL GOAL <70: ICD-10-CM

## 2022-03-03 DIAGNOSIS — Z78.9 TAKES DIETARY SUPPLEMENTS: ICD-10-CM

## 2022-03-03 PROCEDURE — 99607 MTMS BY PHARM ADDL 15 MIN: CPT | Performed by: PHARMACIST

## 2022-03-03 PROCEDURE — 99605 MTMS BY PHARM NP 15 MIN: CPT | Performed by: PHARMACIST

## 2022-03-03 RX ORDER — SEMAGLUTIDE 1.34 MG/ML
INJECTION, SOLUTION SUBCUTANEOUS
Qty: 1.5 ML | Refills: 1 | Status: SHIPPED | OUTPATIENT
Start: 2022-03-03 | End: 2022-05-11

## 2022-03-03 NOTE — PATIENT INSTRUCTIONS
Recommendations from today's MTM visit:                                                    MTM (medication therapy management) is a service provided by a clinical pharmacist designed to help you get the most of out of your medicines.   Today we reviewed what your medicines are for, how to know if they are working, that your medicines are safe and how to make your medicine regimen as easy as possible.      1. I was wrong about Victoza having a coupon, it looks like they must have discontinued it recently. But I have good news! After our visit, I looked further into your insurance formulary and found that another medication in the same class as Victoza may actually be more affordable for you. This medication is called Ozempic (and is only administered once a week instead of once a day). I am going to try sending into the pharmacy and see if it is covered. We may have to wait a few weeks because you just picked up Victoza to see what happens.   The other good thing is that if there is a cost with the Ozempic, there is a coupon that can help bring it down. Here it is: https://www.[employer]/savings-and-resources/save-on-ozempic.html    2. I have sent in a prescription for the continuous glucose monitor called FreeStyle Kay 2 sensor. I will send a link to your email at [email or web address] to have you share your sugar readings with me.    3. Since you are not currently having any symptoms of low B12 levels (tingling in hands or feet) and not completely deficient in B12, we should just monitor for now. In the future if you ever have any tingling in your hands or feet, we could have you start vitamin B12.     Follow-up: Will check in on Ozempic coverage in a few weeks and send you message though [employer]    It was great to speak with you today.  I value your experience and would be very thankful for your time with providing feedback on our clinic survey. You may receive a survey via email or text message in the next  few days.     To schedule another MTM appointment, please call the clinic directly or you may call the MTM scheduling line at [phone] or toll-free at [phone].     My Clinical Pharmacist's contact information:                                                      Please feel free to contact me with any questions or concerns you have.      Zoya Page, PharmD   Medication Therapy Management   Pharmacist Resident  Pager: [phone]

## 2022-03-06 ENCOUNTER — HEALTH MAINTENANCE LETTER (OUTPATIENT)
Age: 42
End: 2022-03-06

## 2022-03-23 ENCOUNTER — MYC MEDICAL ADVICE (OUTPATIENT)
Dept: FAMILY MEDICINE | Facility: CLINIC | Age: 42
End: 2022-03-23
Payer: COMMERCIAL

## 2022-04-20 ENCOUNTER — MYC MEDICAL ADVICE (OUTPATIENT)
Dept: FAMILY MEDICINE | Facility: CLINIC | Age: 42
End: 2022-04-20
Payer: COMMERCIAL

## 2022-05-10 DIAGNOSIS — E11.65 TYPE 2 DIABETES MELLITUS WITH HYPERGLYCEMIA, WITHOUT LONG-TERM CURRENT USE OF INSULIN (H): ICD-10-CM

## 2022-05-11 NOTE — TELEPHONE ENCOUNTER
Routing refill request to provider for review/approval because:  Drug not active on patient's medication list  Pt MyCharted 4/20/2022 that they are on med  Cherelle ADAN RN

## 2022-05-12 ENCOUNTER — NURSE TRIAGE (OUTPATIENT)
Dept: NURSING | Facility: CLINIC | Age: 42
End: 2022-05-12
Payer: COMMERCIAL

## 2022-05-12 RX ORDER — SEMAGLUTIDE 1.34 MG/ML
0.5 INJECTION, SOLUTION SUBCUTANEOUS WEEKLY
Qty: 2 MG | Refills: 0 | Status: SHIPPED | OUTPATIENT
Start: 2022-05-12 | End: 2022-06-10

## 2022-05-12 NOTE — TELEPHONE ENCOUNTER
Patient returning call and RN updated her on Dr. Clemons's message from 5-12-22 to make appointment for physical and diabetes check.  Patient didn't have a question about a pap smear.  Transferred patient to schedulers.      Delma Senior RN  [facility]

## 2022-05-12 NOTE — TELEPHONE ENCOUNTER
AURELIA.  I have sent a detailed voice message to patient to set up appointment for physical and diabetes recheck.  I have given 1 month refill of Ozempic.  If patient calls back about updated Pap smear its please update the health maintenance and she should still be advised to be seen for diabetes mellitus.

## 2022-06-16 NOTE — PROGRESS NOTES
SUBJECTIVE:   CC: April FRANCIS Forbes is an 42 year old woman who presents for preventive health visit.       Patient has been advised of split billing requirements and indicates understanding: Yes  Healthy Habits:     Getting at least 3 servings of Calcium per day:  NO    Bi-annual eye exam:  Yes    Dental care twice a year:  NO    Sleep apnea or symptoms of sleep apnea:  None    Diet:  Diabetic    Frequency of exercise:  1 day/week    Duration of exercise:  30-45 minutes    Taking medications regularly:  Yes    PHQ-2 Total Score: 4    Additional concerns today:  Yes    1. cough  Coughing for 4 weeks  initially explosive type, acute onset & started - after a conference in [place].  Floyd also had some similar cough.  initially her cough felt viral, has had neg covid home tests twice.  Cough is better but unresolved  Dry hacking - all day long.  Triggers -  tickle in the back of the throat,  talking  Or exposure to  change in air temperature .  ? lisinopril because coughs more after taking it  Has cut the lisinopril in 1/2 tab for 2-3 months ago.    COVID in 2020  She is boosted and vaccinated for covid       2. Medications for Diabetes  & wt management.   CGM- denies no hypoglycemia, < 70  average for past 90 days in 130s  Quite consistent for 1  week-, 2 weeks or 30 days  Still vomiting from  ozempic at  0.5mg dose & possibly dose related. Somewhat tolerated 0.25 better- was started end of March- she has noted at home 10 lbs weight loss     Wt Readings from Last 5 Encounters:   06/20/22 109 kg (240 lb 3.2 oz)   02/15/22 114.9 kg (253 lb 6.4 oz)   07/18/19 103.4 kg (228 lb)   12/08/18 102.1 kg (225 lb)   10/01/18 102.1 kg (225 lb)           Answers for HPI/ROS submitted by the patient on 6/20/2022  If you checked off any problems, how difficult have these problems made it for you to do your work, take care of things at home, or get along with other people?: Not difficult at all  PHQ9 TOTAL SCORE: 4        Today's PHQ-2  Score:   PHQ-2 ( 1999 Pfizer) 6/20/2022   Q1: Little interest or pleasure in doing things 2   Q2: Feeling down, depressed or hopeless 2   PHQ-2 Score 4   PHQ-2 Total Score (12-17 Years)- Positive if 3 or more points; Administer PHQ-A if positive -   Q1: Little interest or pleasure in doing things More than half the days   Q2: Feeling down, depressed or hopeless More than half the days   PHQ-2 Score 4       Abuse: Current or Past (Physical, Sexual or Emotional) - No  Do you feel safe in your environment? Yes    Have you ever done Advance Care Planning? (For example, a Health Directive, POLST, or a discussion with a medical provider or your loved ones about your wishes): No, advance care planning information given to patient to review.  Patient declined advance care planning discussion at this time.    Social History     Tobacco Use     Smoking status: Never Smoker     Smokeless tobacco: Never Used   Substance Use Topics     Alcohol use: No     Alcohol/week: 0.0 standard drinks     Comment: 0-1 Drinks Per Week     If you drink alcohol do you typically have >3 drinks per day or >7 drinks per week? No    Alcohol Use 6/20/2022   Prescreen: >3 drinks/day or >7 drinks/week? No   Prescreen: >3 drinks/day or >7 drinks/week? -   No flowsheet data found.    Reviewed orders with patient.  Reviewed health maintenance and updated orders accordingly - Yes  BP Readings from Last 3 Encounters:   06/20/22 121/85   02/15/22 (!) 157/107   07/18/19 122/79    Wt Readings from Last 3 Encounters:   06/20/22 109 kg (240 lb 3.2 oz)   02/15/22 114.9 kg (253 lb 6.4 oz)   07/18/19 103.4 kg (228 lb)                    Breast Cancer Screening:  Any new diagnosis of family breast, ovarian, or bowel cancer?     FHS-7:   Breast CA Risk Assessment (FHS-7) 6/20/2022   Did any of your first-degree relatives have breast or ovarian cancer? Yes   Did any of your relatives have bilateral breast cancer? No   Did any man in your family have breast cancer? No    Did any woman in your family have breast and ovarian cancer? Yes   Did any woman in your family have breast cancer before age 50 y? No   Do you have 2 or more relatives with breast and/or ovarian cancer? Unknown   Do you have 2 or more relatives with breast and/or bowel cancer? Unknown         History of abnormal Pap smear: NO - age 30-65 PAP every 5 years with negative HPV co-testing recommended  PAP / HPV Latest Ref Rng & Units 1/29/2018 2/18/2014 3/31/2011   PAP (Historical) - NIL NIL NIL   HPV16 NEG:Negative Negative - -   HPV18 NEG:Negative Negative - -   HRHPV NEG:Negative Negative - -     Reviewed and updated as needed this visit by clinical staff   Tobacco  Allergies  Meds  Problems  Med Hx  Surg Hx  Fam Hx            Reviewed and updated as needed this visit by Provider   Tobacco  Allergies  Meds  Problems  Med Hx  Surg Hx  Fam Hx               Review of Systems   Constitutional: Negative for chills and fever.   HENT: Positive for hearing loss. Negative for congestion, ear pain and sore throat.    Eyes: Negative for pain and visual disturbance.   Respiratory: Positive for cough. Negative for shortness of breath.    Cardiovascular: Negative for chest pain, palpitations and peripheral edema.   Gastrointestinal: Positive for diarrhea. Negative for abdominal pain, constipation, heartburn, hematochezia and nausea.   Breasts:  Positive for tenderness. Negative for breast mass and discharge.   Genitourinary: Negative for dysuria, frequency, genital sores, hematuria, pelvic pain, urgency, vaginal bleeding and vaginal discharge.   Musculoskeletal: Negative for arthralgias, joint swelling and myalgias.   Skin: Negative for rash.   Neurological: Positive for dizziness and weakness. Negative for headaches and paresthesias.   Psychiatric/Behavioral: Positive for mood changes. The patient is not nervous/anxious.           OBJECTIVE:   /85   Pulse 93   Temp 97.3  F (36.3  C) (Temporal)   Wt 109 kg  (240 lb 3.2 oz)   LMP  (LMP Unknown)   SpO2 100%   BMI 45.76 kg/m    Physical Exam  GENERAL: healthy, alert and no distress  EYES: Eyes grossly normal to inspection, PERRL and conjunctivae and sclerae normal  HENT: ear canals and TM's normal, nose and mouth without ulcers or lesions  NECK: no adenopathy, no asymmetry, masses, or scars and thyroid normal to palpation  RESP: lungs clear to auscultation - no rales, rhonchi or wheezes  BREAST: normal without masses, tenderness or nipple discharge and no palpable axillary masses or adenopathy  CV: regular rate and rhythm, normal S1 S2, no S3 or S4, no murmur, click or rub, no peripheral edema and peripheral pulses strong  ABDOMEN: soft, nontender, no hepatosplenomegaly, no masses and bowel sounds normal   (female): normal female external genitalia, normal urethral meatus, vaginal mucosa pink, moist, well rugated, and normal cervix/adnexa/uterus without masses or discharge  MS: no gross musculoskeletal defects noted, no edema  SKIN: no suspicious lesions or rashes  NEURO: Normal strength and tone, mentation intact and speech normal  PSYCH: mentation appears normal, affect normal/bright    Diagnostic Test Results:  Labs reviewed in Epic  Results for orders placed or performed in visit on 06/20/22 (from the past 24 hour(s))   Hemoglobin A1c   Result Value Ref Range    Hemoglobin A1C 6.6 (H) 0.0 - 5.6 %       ASSESSMENT/PLAN:   April was seen today for physical.    Diagnoses and all orders for this visit:    Routine general medical examination at a health care facility  mammogram advised, referral is given.  Repeat every 1 to 2 years till age 50 and then annually.  Pap smear completed today if normal with negative HPV repeat is okay in 5 years.    Cervical cancer screening  -     Pap Screen with HPV - recommended age 30 - 65 years    Upper airway cough syndrome  Comments: Ongoing coughing with most likely postnasal dripping, versus infection versus medication reaction  like lisinopril.  Differential diagnoses include upper airway syndrome, asthma, infection.  She has been negative for COVID multiple times.  I do recommend treatment with antihistamine as follows and follow-up in 4 weeks earlier if more concerns.  Take singular 10 mg.  flonase daily  cxr today to rule out mass or pneumonia.  Chest x-ray is well within normal limits official report is pending  monitor cough.  fup in 4 weeks? discuss if lisinopril needs to be stopped  Orders:  -     XR Chest 2 Views; Future  -     montelukast (SINGULAIR) 10 MG tablet; Take 1 tablet (10 mg) by mouth At Bedtime  -     fluticasone (FLONASE) 50 MCG/ACT nasal spray; Spray 1 spray into both nostrils daily    Mild intermittent asthma without complication  -A CT not completed because of ongoing cough.  I have refilled albuterol that she has not been using as needed.    Albuterol (PROAIR HFA/PROVENTIL HFA/VENTOLIN HFA) 108 (90 Base) MCG/ACT inhaler; Inhale 2 puffs into the lungs every 6 hours as needed for shortness of breath / dyspnea or wheezing    Need for vaccination  -     TDAP VACCINE (Adacel, Boostrix)  -     Pneumococcal 20 Valent Conjugate (Prevnar 20)    Type 2 diabetes mellitus with hyperglycemia, without long-term current use of insulin (H)  Comments: Well-controlled.  I do recommend to decrease Ozempic and monitor glucose.  It may decrease nausea improved tolerance.  She has had some weight loss while on Ozempic.  I have decreased lisinopril from 20 to 2.5 mg because patient already decreased dose and now blood pressure is well within normal limits we will see if that responds to maintaining blood pressure as well.  I will send her detailedAt message once.  Have all the blood test back  Orders:  -     lisinopril (ZESTRIL) 2.5 MG tablet; Take 1 tablet (2.5 mg) by mouth daily  -     metFORMIN (GLUCOPHAGE XR) 500 MG 24 hr tablet; Take 2 tablets (1,000 mg) by mouth 2 times daily (with meals)  -     Basic metabolic panel  (Ca, Cl, CO2,  Creat, Gluc, K, Na, BUN); Future  -     Hemoglobin A1c; Future  -     FOOT EXAM  -     Basic metabolic panel  (Ca, Cl, CO2, Creat, Gluc, K, Na, BUN)  -     Hemoglobin A1c    Encounter for screening mammogram for malignant neoplasm of breast  -     MA Screen Bilateral w/Donald; Future    Regarding obesity, lifestyle changes with focus on more whole food vegetable-based diet and regular exercise discussed.  Ozempic may help as well though I believe the dose need to be decreased.    Patient has been advised of split billing requirements and indicates understanding: Yes    COUNSELING:  Reviewed preventive health counseling, as reflected in patient instructions       Regular exercise       Healthy diet/nutrition       Vision screening       Hearing screening        She reports that she has never smoked. She has never used smokeless tobacco.      Counseling Resources:  ATP IV Guidelines  Pooled Cohorts Equation Calculator  Breast Cancer Risk Calculator  BRCA-Related Cancer Risk Assessment: FHS-7 Tool  FRAX Risk Assessment  ICSI Preventive Guidelines  Dietary Guidelines for Americans, 2010  USDA's MyPlate  ASA Prophylaxis  Lung CA Screening    Shivani Clemons MD  [facility]

## 2022-06-20 ENCOUNTER — OFFICE VISIT (OUTPATIENT)
Dept: FAMILY MEDICINE | Facility: CLINIC | Age: 42
End: 2022-06-20
Payer: COMMERCIAL

## 2022-06-20 VITALS
TEMPERATURE: 97.3 F | BODY MASS INDEX: 45.76 KG/M2 | DIASTOLIC BLOOD PRESSURE: 85 MMHG | HEART RATE: 93 BPM | SYSTOLIC BLOOD PRESSURE: 121 MMHG | WEIGHT: 240.2 LBS | OXYGEN SATURATION: 100 %

## 2022-06-20 DIAGNOSIS — E66.813 CLASS 3 SEVERE OBESITY DUE TO EXCESS CALORIES WITH SERIOUS COMORBIDITY AND BODY MASS INDEX (BMI) OF 45.0 TO 49.9 IN ADULT (H): ICD-10-CM

## 2022-06-20 DIAGNOSIS — E66.01 CLASS 3 SEVERE OBESITY DUE TO EXCESS CALORIES WITH SERIOUS COMORBIDITY AND BODY MASS INDEX (BMI) OF 45.0 TO 49.9 IN ADULT (H): ICD-10-CM

## 2022-06-20 DIAGNOSIS — E11.65 TYPE 2 DIABETES MELLITUS WITH HYPERGLYCEMIA, WITHOUT LONG-TERM CURRENT USE OF INSULIN (H): ICD-10-CM

## 2022-06-20 DIAGNOSIS — R05.8 UPPER AIRWAY COUGH SYNDROME: ICD-10-CM

## 2022-06-20 DIAGNOSIS — Z00.00 ROUTINE GENERAL MEDICAL EXAMINATION AT A HEALTH CARE FACILITY: Primary | ICD-10-CM

## 2022-06-20 DIAGNOSIS — Z12.31 ENCOUNTER FOR SCREENING MAMMOGRAM FOR MALIGNANT NEOPLASM OF BREAST: ICD-10-CM

## 2022-06-20 DIAGNOSIS — Z12.4 CERVICAL CANCER SCREENING: ICD-10-CM

## 2022-06-20 DIAGNOSIS — Z23 NEED FOR VACCINATION: ICD-10-CM

## 2022-06-20 DIAGNOSIS — J45.20 MILD INTERMITTENT ASTHMA WITHOUT COMPLICATION: ICD-10-CM

## 2022-06-20 LAB — HBA1C MFR BLD: 6.6 % (ref 0–5.6)

## 2022-06-20 PROCEDURE — 90677 PCV20 VACCINE IM: CPT | Performed by: FAMILY MEDICINE

## 2022-06-20 PROCEDURE — 99207 PR FOOT EXAM NO CHARGE: CPT | Mod: 25 | Performed by: FAMILY MEDICINE

## 2022-06-20 PROCEDURE — 90472 IMMUNIZATION ADMIN EACH ADD: CPT | Performed by: FAMILY MEDICINE

## 2022-06-20 PROCEDURE — G0145 SCR C/V CYTO,THINLAYER,RESCR: HCPCS | Performed by: FAMILY MEDICINE

## 2022-06-20 PROCEDURE — 90715 TDAP VACCINE 7 YRS/> IM: CPT | Performed by: FAMILY MEDICINE

## 2022-06-20 PROCEDURE — 99396 PREV VISIT EST AGE 40-64: CPT | Mod: 25 | Performed by: FAMILY MEDICINE

## 2022-06-20 PROCEDURE — 99213 OFFICE O/P EST LOW 20 MIN: CPT | Mod: 25 | Performed by: FAMILY MEDICINE

## 2022-06-20 PROCEDURE — 87624 HPV HI-RISK TYP POOLED RSLT: CPT | Performed by: FAMILY MEDICINE

## 2022-06-20 PROCEDURE — 83036 HEMOGLOBIN GLYCOSYLATED A1C: CPT | Performed by: FAMILY MEDICINE

## 2022-06-20 PROCEDURE — 36415 COLL VENOUS BLD VENIPUNCTURE: CPT | Performed by: FAMILY MEDICINE

## 2022-06-20 PROCEDURE — 80048 BASIC METABOLIC PNL TOTAL CA: CPT | Performed by: FAMILY MEDICINE

## 2022-06-20 PROCEDURE — 90471 IMMUNIZATION ADMIN: CPT | Performed by: FAMILY MEDICINE

## 2022-06-20 RX ORDER — ALBUTEROL SULFATE 90 UG/1
2 AEROSOL, METERED RESPIRATORY (INHALATION) EVERY 6 HOURS PRN
Qty: 6.7 G | Refills: 11 | Status: SHIPPED | OUTPATIENT
Start: 2022-06-20

## 2022-06-20 RX ORDER — LISINOPRIL 2.5 MG/1
2.5 TABLET ORAL DAILY
Qty: 30 TABLET | Refills: 11 | Status: SHIPPED | OUTPATIENT
Start: 2022-06-20 | End: 2023-03-14

## 2022-06-20 RX ORDER — METFORMIN HCL 500 MG
1000 TABLET, EXTENDED RELEASE 24 HR ORAL 2 TIMES DAILY WITH MEALS
Qty: 360 TABLET | Refills: 3 | Status: SHIPPED | OUTPATIENT
Start: 2022-06-20 | End: 2023-03-14

## 2022-06-20 RX ORDER — MONTELUKAST SODIUM 10 MG/1
10 TABLET ORAL AT BEDTIME
Qty: 30 TABLET | Refills: 1 | Status: SHIPPED | OUTPATIENT
Start: 2022-06-20 | End: 2023-11-07

## 2022-06-20 RX ORDER — FLUTICASONE PROPIONATE 50 MCG
1 SPRAY, SUSPENSION (ML) NASAL DAILY
Qty: 9.9 ML | Refills: 3 | Status: SHIPPED | OUTPATIENT
Start: 2022-06-20 | End: 2022-08-12

## 2022-06-20 ASSESSMENT — ENCOUNTER SYMPTOMS
ARTHRALGIAS: 0
FREQUENCY: 0
DIARRHEA: 1
BREAST MASS: 0
FEVER: 0
PARESTHESIAS: 0
HEMATURIA: 0
NAUSEA: 0
HEADACHES: 0
CONSTIPATION: 0
PALPITATIONS: 0
SORE THROAT: 0
DIZZINESS: 1
SHORTNESS OF BREATH: 0
EYE PAIN: 0
HEARTBURN: 0
MYALGIAS: 0
COUGH: 1
HEMATOCHEZIA: 0
JOINT SWELLING: 0
WEAKNESS: 1
NERVOUS/ANXIOUS: 0
DYSURIA: 0
ABDOMINAL PAIN: 0
CHILLS: 0

## 2022-06-20 ASSESSMENT — PATIENT HEALTH QUESTIONNAIRE - PHQ9
SUM OF ALL RESPONSES TO PHQ QUESTIONS 1-9: 4
SUM OF ALL RESPONSES TO PHQ QUESTIONS 1-9: 4
10. IF YOU CHECKED OFF ANY PROBLEMS, HOW DIFFICULT HAVE THESE PROBLEMS MADE IT FOR YOU TO DO YOUR WORK, TAKE CARE OF THINGS AT HOME, OR GET ALONG WITH OTHER PEOPLE: NOT DIFFICULT AT ALL

## 2022-06-21 ENCOUNTER — TELEPHONE (OUTPATIENT)
Dept: FAMILY MEDICINE | Facility: CLINIC | Age: 42
End: 2022-06-21
Payer: COMMERCIAL

## 2022-06-21 ENCOUNTER — MYC MEDICAL ADVICE (OUTPATIENT)
Dept: FAMILY MEDICINE | Facility: CLINIC | Age: 42
End: 2022-06-21
Payer: COMMERCIAL

## 2022-06-21 DIAGNOSIS — E11.65 TYPE 2 DIABETES MELLITUS WITH HYPERGLYCEMIA, WITHOUT LONG-TERM CURRENT USE OF INSULIN (H): Primary | ICD-10-CM

## 2022-06-21 NOTE — TELEPHONE ENCOUNTER
Patient upset. Received pneumococcal 20 yesterday. Said she was already sick and shouldn't have received the vaccine because she was already sick     Did also receive the tdap in the same arm, per MA, pt requested this and was told of potential side effects.    Advised pt to look for sign of symptoms of infection, treat flu like symptoms with OTC meds. If worsening call back to triage.    Thank you,  Jyotsna Vora RN  [place]

## 2022-06-22 LAB
ANION GAP SERPL CALCULATED.3IONS-SCNC: 11 MMOL/L (ref 3–14)
BUN SERPL-MCNC: 13 MG/DL (ref 7–30)
CALCIUM SERPL-MCNC: 8.8 MG/DL (ref 8.5–10.1)
CHLORIDE BLD-SCNC: 107 MMOL/L (ref 94–109)
CO2 SERPL-SCNC: 18 MMOL/L (ref 20–32)
CREAT SERPL-MCNC: 0.61 MG/DL (ref 0.52–1.04)
GFR SERPL CREATININE-BSD FRML MDRD: >90 ML/MIN/1.73M2
GLUCOSE BLD-MCNC: 113 MG/DL (ref 70–99)
POTASSIUM BLD-SCNC: 4.3 MMOL/L (ref 3.4–5.3)
SODIUM SERPL-SCNC: 136 MMOL/L (ref 133–144)

## 2022-06-23 LAB
BKR LAB AP GYN ADEQUACY: NORMAL
BKR LAB AP GYN INTERPRETATION: NORMAL
BKR LAB AP HPV REFLEX: NORMAL
BKR LAB AP LMP: NORMAL
BKR LAB AP PREVIOUS ABNORMAL: NORMAL
PATH REPORT.COMMENTS IMP SPEC: NORMAL
PATH REPORT.COMMENTS IMP SPEC: NORMAL
PATH REPORT.RELEVANT HX SPEC: NORMAL

## 2022-06-24 LAB
HUMAN PAPILLOMA VIRUS 16 DNA: NEGATIVE
HUMAN PAPILLOMA VIRUS 18 DNA: NEGATIVE
HUMAN PAPILLOMA VIRUS FINAL DIAGNOSIS: NORMAL
HUMAN PAPILLOMA VIRUS OTHER HR: NEGATIVE

## 2022-07-26 DIAGNOSIS — E11.65 TYPE 2 DIABETES MELLITUS WITH HYPERGLYCEMIA, WITHOUT LONG-TERM CURRENT USE OF INSULIN (H): ICD-10-CM

## 2022-07-27 NOTE — TELEPHONE ENCOUNTER
A.S.    Routing refill request to provider for review/approval because:  Drug not on the [facility] refill protocol   Last OV 6/20/22    Thank you,  Candace Wong RN

## 2022-08-10 ENCOUNTER — MYC MEDICAL ADVICE (OUTPATIENT)
Dept: FAMILY MEDICINE | Facility: CLINIC | Age: 42
End: 2022-08-10

## 2022-08-15 ENCOUNTER — TELEPHONE (OUTPATIENT)
Dept: PHARMACY | Facility: CLINIC | Age: 42
End: 2022-08-15

## 2022-08-15 NOTE — TELEPHONE ENCOUNTER
Cherelle,    Patient called [facility] Scheduling line hoping to speak to you in regards to side effects such as vomiting with new medication. She mentioned she emailed the provider and would like a new treatment plan due to severe side effects.    Thanks,    Erin Freedman, [facility]

## 2022-08-16 ENCOUNTER — VIRTUAL VISIT (OUTPATIENT)
Dept: PHARMACY | Facility: CLINIC | Age: 42
End: 2022-08-16
Payer: COMMERCIAL

## 2022-08-16 DIAGNOSIS — E11.65 TYPE 2 DIABETES MELLITUS WITH HYPERGLYCEMIA, WITHOUT LONG-TERM CURRENT USE OF INSULIN (H): Primary | ICD-10-CM

## 2022-08-16 PROCEDURE — 99607 MTMS BY PHARM ADDL 15 MIN: CPT | Performed by: PHARMACIST

## 2022-08-16 PROCEDURE — 99606 MTMS BY PHARM EST 15 MIN: CPT | Performed by: PHARMACIST

## 2022-08-16 RX ORDER — DULAGLUTIDE 0.75 MG/.5ML
0.75 INJECTION, SOLUTION SUBCUTANEOUS
Qty: 2 ML | Refills: 0 | Status: SHIPPED | OUTPATIENT
Start: 2022-08-16 | End: 2022-09-14

## 2022-08-16 NOTE — PROGRESS NOTES
Medication Therapy Management (MTM) Encounter    ASSESSMENT:                            Medication Adherence/Access: No issues identified    Type 2 Diabetes: Having significant SE on Ozempic. Discussed options - could consider switching GLP1 (has responded well to this class as far as BG is concerned), changing to SGLT2i (Jardiance) or changing to a DPP4i. At this time she would like to try Trulicity as she has felt like this family has been good for managing her BG. If SE are intolerable, will change to Jardiance.     PLAN:                            1. Stop Ozempic  2. Wait 1-2 weeks, then start Trulicity 0.75mg weekly    Follow-up: 2-3 weeks via BinWise for SE check.     SUBJECTIVE/OBJECTIVE:                          Alejandra Forbes is a 42 year old female contacted via secure video for a follow-up visit.  Today's visit is a follow-up MTM visit from 3/3/22     Reason for visit: SE with Ozempic.     Allergies/ADRs: Reviewed in chart  Past Medical History: Reviewed in chart  Tobacco: She reports that she has never smoked. She has never used smokeless tobacco.  Alcohol: 1-3 beverages / week    Medication Adherence/Access: no issues reported    Type 2 Diabetes:  Currently taking Ozempic 0.5mg and metformin ER 500mg 4 tablets daily. Patient is experiencing the following side effects: nausea and vomiting - can't eat more than once a day. Doesn't feel like her body is digesting food. This has been ongoing since starting in May (symptoms started after only a few doses).   Prior to being on Ozempic she was on Victoza (changed to see if Ozempic had improved efficacy). Some nausea on this as well, but no vomiting. She tried reducing the dose of Ozempic to 0.25mg weekly, but BG went up so she resumed 0.5mg weekly this past weekend and BG have been better. Feels like she sees weird spikes at different times of day (up to 200), but otherwise pretty much in range.   Uses Freestyle Kay - AGP at end of note.   Eye exam: up to  date  Foot exam: up to date  ----------------  I spent 32 minutes with this patient today. All changes were made via collaborative practice agreement with Shivani Clemons MD. A copy of the visit note was provided to the patient's provider(s).    The patient was sent via Microsaic a summary of these recommendations.     Cherelle Almanzar PharmD, GA, BCACP  MTM Pharmacist, [facility]     Telemedicine Visit Details  Type of service:  Video Conference via TrendPo  Start Time: 8:31 AM  End Time: 9:03 AM  Originating Location (patient location): Home  Distant Location (provider location):  [facility]     Medication Therapy Recommendations  Type 2 diabetes mellitus with hyperglycemia, without long-term current use of insulin (H)    Current Medication: semaglutide (OZEMPIC) 2 MG/1.5ML SOPN pen (Discontinued)   Rationale: Undesirable effect - Adverse medication event - Safety   Recommendation: Change Medication - Trulicity 0.75 MG/0.5ML Sopn   Status: Accepted per CPA

## 2022-08-16 NOTE — PATIENT INSTRUCTIONS
"Recommendations from today's MTM visit:                                                       1. Stop Ozempic    2. Wait 1-2 weeks, then start Trulicity 0.75mg weekly. Your blood sugars may go up a little bit, but let's give your stomach a little bit of a break.     Follow-up: 2-3 weeks via gokit     It was great speaking with you today.  I value your experience and would be very thankful for your time in providing feedback in our clinic survey. In the next few days, you may receive an email or text message from [employer] with a link to a survey related to your  clinical pharmacist.\"     To schedule another MTM appointment, please call the clinic directly or you may call the MTM scheduling line at [phone] or toll-free at [phone].     My Clinical Pharmacist's contact information:                                                      Please feel free to contact me with any questions or concerns you have.      Cherelle Almanzar, Pharm.D., M.B.A., BCACP  MTM Pharmacist, [facility]  Pager: [phone]  Email: [email or web address]    "

## 2022-08-16 NOTE — TELEPHONE ENCOUNTER
Pt scheduled for MTM visit 8/16.   Cherelle Almanzar PharmD, GA, BCACP  MTM Pharmacist, [facility]

## 2022-09-14 DIAGNOSIS — E11.65 TYPE 2 DIABETES MELLITUS WITH HYPERGLYCEMIA, WITHOUT LONG-TERM CURRENT USE OF INSULIN (H): ICD-10-CM

## 2022-09-14 NOTE — TELEPHONE ENCOUNTER
Mary Jane Villarreal refill request to provider for review/approval because:  Do you want a follow up with patient?    Thank you,  Candace Wong RN

## 2022-09-15 RX ORDER — DULAGLUTIDE 0.75 MG/.5ML
0.75 INJECTION, SOLUTION SUBCUTANEOUS
Qty: 2 ML | Refills: 0 | Status: SHIPPED | OUTPATIENT
Start: 2022-09-15 | End: 2022-10-10

## 2022-09-15 NOTE — TELEPHONE ENCOUNTER
Called pt, VM left - need to determine if we should continue 0.75mg or change to 1.5mg weekly based on SE and BG control. I'm not sure where her BG is at.     Cherelle Almanzar, DamienD, GA, BCACP  MTM Pharmacist, [facility]

## 2022-09-15 NOTE — TELEPHONE ENCOUNTER
Patient calling and does not want to go up to the 1.5mg and would like to stay at the .75 dose as she is sensitive to this increase. Patient is going out of town tomorrow so would like this refilled ASAP.     Lily Olivarez RN

## 2022-11-16 ENCOUNTER — MYC MEDICAL ADVICE (OUTPATIENT)
Dept: OPHTHALMOLOGY | Facility: CLINIC | Age: 42
End: 2022-11-16

## 2022-11-21 ENCOUNTER — HEALTH MAINTENANCE LETTER (OUTPATIENT)
Age: 42
End: 2022-11-21

## 2022-12-01 ENCOUNTER — TELEPHONE (OUTPATIENT)
Dept: OPHTHALMOLOGY | Facility: CLINIC | Age: 42
End: 2022-12-01

## 2022-12-01 NOTE — TELEPHONE ENCOUNTER
April can make an appointment with Annetta Boyd for a glaucoma visit     Tawana Denton Communication Facilitator on 12/1/2022 at 12:44 PM

## 2022-12-01 NOTE — TELEPHONE ENCOUNTER
[facility] Call Center    Phone Message    May a detailed message be left on voicemail: yes     Reason for Call: Other: Patient called to schedule an appt with Dr. Littlejohn. Confused as to what she needs to be seen for. Retina or Glaucoma? Please reach out to patient with information     Action Taken: Other: Eye    Travel Screening: Not Applicable

## 2022-12-04 DIAGNOSIS — E11.65 TYPE 2 DIABETES MELLITUS WITH HYPERGLYCEMIA, WITHOUT LONG-TERM CURRENT USE OF INSULIN (H): ICD-10-CM

## 2022-12-05 NOTE — TELEPHONE ENCOUNTER
Routing refill request to provider for review/approval because:  Drug not on the [facility] refill protocol   Patient had recent follow-up with MTM.  Julianna ALVARADO RN

## 2022-12-25 ENCOUNTER — HEALTH MAINTENANCE LETTER (OUTPATIENT)
Age: 42
End: 2022-12-25

## 2023-01-26 DIAGNOSIS — E11.65 TYPE 2 DIABETES MELLITUS WITH HYPERGLYCEMIA, WITHOUT LONG-TERM CURRENT USE OF INSULIN (H): ICD-10-CM

## 2023-01-27 RX ORDER — DULAGLUTIDE 1.5 MG/.5ML
1.5 INJECTION, SOLUTION SUBCUTANEOUS
Qty: 6 ML | Refills: 0 | Status: SHIPPED | OUTPATIENT
Start: 2023-01-27 | End: 2023-04-17

## 2023-01-27 NOTE — TELEPHONE ENCOUNTER
Triage,   Patient called.   Needs med refilled.   Patient is out of medication and next injection is on Monday.     Doris MORTON

## 2023-01-27 NOTE — TELEPHONE ENCOUNTER
Routing refill request to provider for review/approval because:  Labs out of range:  A1C  Julianna ALVARADO RN

## 2023-01-27 NOTE — TELEPHONE ENCOUNTER
YOLANDA (GAGE),  Patient requesting refill today  Unable to  next week due to work travel    Routing refill request to provider for review/approval because:  Labs out of range:  A1C  Scheduled for future OV 2/7/23  Merlyn VEGA RN

## 2023-03-14 ENCOUNTER — OFFICE VISIT (OUTPATIENT)
Dept: FAMILY MEDICINE | Facility: CLINIC | Age: 43
End: 2023-03-14
Payer: COMMERCIAL

## 2023-03-14 ENCOUNTER — LAB (OUTPATIENT)
Dept: FAMILY MEDICINE | Facility: CLINIC | Age: 43
End: 2023-03-14

## 2023-03-14 VITALS
BODY MASS INDEX: 46.88 KG/M2 | TEMPERATURE: 99.2 F | WEIGHT: 238.8 LBS | HEIGHT: 60 IN | DIASTOLIC BLOOD PRESSURE: 89 MMHG | HEART RATE: 90 BPM | RESPIRATION RATE: 18 BRPM | SYSTOLIC BLOOD PRESSURE: 139 MMHG | OXYGEN SATURATION: 99 %

## 2023-03-14 DIAGNOSIS — G35 MULTIPLE SCLEROSIS (H): ICD-10-CM

## 2023-03-14 DIAGNOSIS — Z12.11 SCREEN FOR COLON CANCER: ICD-10-CM

## 2023-03-14 DIAGNOSIS — F31.81 BIPOLAR 2 DISORDER (H): ICD-10-CM

## 2023-03-14 DIAGNOSIS — E66.813 CLASS 3 SEVERE OBESITY DUE TO EXCESS CALORIES WITH SERIOUS COMORBIDITY AND BODY MASS INDEX (BMI) OF 45.0 TO 49.9 IN ADULT (H): ICD-10-CM

## 2023-03-14 DIAGNOSIS — Z23 NEED FOR VACCINATION: ICD-10-CM

## 2023-03-14 DIAGNOSIS — K58.2 IRRITABLE BOWEL SYNDROME WITH BOTH CONSTIPATION AND DIARRHEA: ICD-10-CM

## 2023-03-14 DIAGNOSIS — E11.65 TYPE 2 DIABETES MELLITUS WITH HYPERGLYCEMIA, WITHOUT LONG-TERM CURRENT USE OF INSULIN (H): Primary | ICD-10-CM

## 2023-03-14 DIAGNOSIS — E66.01 CLASS 3 SEVERE OBESITY DUE TO EXCESS CALORIES WITH SERIOUS COMORBIDITY AND BODY MASS INDEX (BMI) OF 45.0 TO 49.9 IN ADULT (H): ICD-10-CM

## 2023-03-14 DIAGNOSIS — Z12.31 ENCOUNTER FOR SCREENING MAMMOGRAM FOR MALIGNANT NEOPLASM OF BREAST: ICD-10-CM

## 2023-03-14 LAB
ANION GAP SERPL CALCULATED.3IONS-SCNC: 12 MMOL/L (ref 7–15)
BUN SERPL-MCNC: 14.3 MG/DL (ref 6–20)
CALCIUM SERPL-MCNC: 9 MG/DL (ref 8.6–10)
CHLORIDE SERPL-SCNC: 103 MMOL/L (ref 98–107)
CHOLEST SERPL-MCNC: 174 MG/DL
CREAT SERPL-MCNC: 0.72 MG/DL (ref 0.51–0.95)
CREAT UR-MCNC: 148 MG/DL
DEPRECATED HCO3 PLAS-SCNC: 23 MMOL/L (ref 22–29)
GFR SERPL CREATININE-BSD FRML MDRD: >90 ML/MIN/1.73M2
GLUCOSE SERPL-MCNC: 161 MG/DL (ref 70–99)
HBA1C MFR BLD: 7.1 % (ref 0–5.6)
HBV SURFACE AB SERPL IA-ACNC: 2.46 M[IU]/ML
HBV SURFACE AB SERPL IA-ACNC: NONREACTIVE M[IU]/ML
HBV SURFACE AG SERPL QL IA: NONREACTIVE
HDLC SERPL-MCNC: 40 MG/DL
LDLC SERPL CALC-MCNC: 115 MG/DL
MICROALBUMIN UR-MCNC: <12 MG/L
MICROALBUMIN/CREAT UR: NORMAL MG/G{CREAT}
NONHDLC SERPL-MCNC: 134 MG/DL
POTASSIUM SERPL-SCNC: 4.5 MMOL/L (ref 3.4–5.3)
SODIUM SERPL-SCNC: 138 MMOL/L (ref 136–145)
TRIGL SERPL-MCNC: 97 MG/DL

## 2023-03-14 PROCEDURE — 90686 IIV4 VACC NO PRSV 0.5 ML IM: CPT | Performed by: FAMILY MEDICINE

## 2023-03-14 PROCEDURE — 82043 UR ALBUMIN QUANTITATIVE: CPT | Performed by: FAMILY MEDICINE

## 2023-03-14 PROCEDURE — 99207 PR FOOT EXAM NO CHARGE: CPT | Performed by: FAMILY MEDICINE

## 2023-03-14 PROCEDURE — 83036 HEMOGLOBIN GLYCOSYLATED A1C: CPT | Performed by: FAMILY MEDICINE

## 2023-03-14 PROCEDURE — 36415 COLL VENOUS BLD VENIPUNCTURE: CPT | Performed by: FAMILY MEDICINE

## 2023-03-14 PROCEDURE — 90472 IMMUNIZATION ADMIN EACH ADD: CPT | Performed by: FAMILY MEDICINE

## 2023-03-14 PROCEDURE — 80061 LIPID PANEL: CPT | Performed by: FAMILY MEDICINE

## 2023-03-14 PROCEDURE — 90746 HEPB VACCINE 3 DOSE ADULT IM: CPT | Performed by: FAMILY MEDICINE

## 2023-03-14 PROCEDURE — 87340 HEPATITIS B SURFACE AG IA: CPT | Performed by: FAMILY MEDICINE

## 2023-03-14 PROCEDURE — 82570 ASSAY OF URINE CREATININE: CPT | Performed by: FAMILY MEDICINE

## 2023-03-14 PROCEDURE — 86706 HEP B SURFACE ANTIBODY: CPT | Performed by: FAMILY MEDICINE

## 2023-03-14 PROCEDURE — 80048 BASIC METABOLIC PNL TOTAL CA: CPT | Performed by: FAMILY MEDICINE

## 2023-03-14 PROCEDURE — 90471 IMMUNIZATION ADMIN: CPT | Performed by: FAMILY MEDICINE

## 2023-03-14 PROCEDURE — 99214 OFFICE O/P EST MOD 30 MIN: CPT | Mod: 25 | Performed by: FAMILY MEDICINE

## 2023-03-14 RX ORDER — LISINOPRIL 2.5 MG/1
2.5 TABLET ORAL DAILY
Qty: 90 TABLET | Refills: 1 | Status: SHIPPED | OUTPATIENT
Start: 2023-03-14 | End: 2023-12-11

## 2023-03-14 RX ORDER — FAMOTIDINE 20 MG/1
20 TABLET, FILM COATED ORAL 2 TIMES DAILY
Qty: 90 TABLET | Refills: 1 | Status: SHIPPED | OUTPATIENT
Start: 2023-03-14

## 2023-03-14 RX ORDER — METFORMIN HCL 500 MG
1000 TABLET, EXTENDED RELEASE 24 HR ORAL 2 TIMES DAILY WITH MEALS
Qty: 360 TABLET | Refills: 3 | Status: SHIPPED | OUTPATIENT
Start: 2023-03-14 | End: 2024-01-18

## 2023-03-14 RX ORDER — DICYCLOMINE HCL 20 MG
20 TABLET ORAL 3 TIMES DAILY PRN
Qty: 90 TABLET | Refills: 3 | Status: SHIPPED | OUTPATIENT
Start: 2023-03-14 | End: 2023-11-07

## 2023-03-14 RX ORDER — ATORVASTATIN CALCIUM 20 MG/1
20 TABLET, FILM COATED ORAL DAILY
Qty: 90 TABLET | Refills: 1 | Status: SHIPPED | OUTPATIENT
Start: 2023-03-14 | End: 2023-09-14

## 2023-03-14 ASSESSMENT — ANXIETY QUESTIONNAIRES
2. NOT BEING ABLE TO STOP OR CONTROL WORRYING: NOT AT ALL
8. IF YOU CHECKED OFF ANY PROBLEMS, HOW DIFFICULT HAVE THESE MADE IT FOR YOU TO DO YOUR WORK, TAKE CARE OF THINGS AT HOME, OR GET ALONG WITH OTHER PEOPLE?: NOT DIFFICULT AT ALL
5. BEING SO RESTLESS THAT IT IS HARD TO SIT STILL: NOT AT ALL
4. TROUBLE RELAXING: NOT AT ALL
1. FEELING NERVOUS, ANXIOUS, OR ON EDGE: NOT AT ALL
GAD7 TOTAL SCORE: 0
6. BECOMING EASILY ANNOYED OR IRRITABLE: NOT AT ALL
GAD7 TOTAL SCORE: 0
IF YOU CHECKED OFF ANY PROBLEMS ON THIS QUESTIONNAIRE, HOW DIFFICULT HAVE THESE PROBLEMS MADE IT FOR YOU TO DO YOUR WORK, TAKE CARE OF THINGS AT HOME, OR GET ALONG WITH OTHER PEOPLE: NOT DIFFICULT AT ALL
7. FEELING AFRAID AS IF SOMETHING AWFUL MIGHT HAPPEN: NOT AT ALL
GAD7 TOTAL SCORE: 0
3. WORRYING TOO MUCH ABOUT DIFFERENT THINGS: NOT AT ALL
7. FEELING AFRAID AS IF SOMETHING AWFUL MIGHT HAPPEN: NOT AT ALL

## 2023-03-14 ASSESSMENT — ASTHMA QUESTIONNAIRES
QUESTION_2 LAST FOUR WEEKS HOW OFTEN HAVE YOU HAD SHORTNESS OF BREATH: NOT AT ALL
QUESTION_1 LAST FOUR WEEKS HOW MUCH OF THE TIME DID YOUR ASTHMA KEEP YOU FROM GETTING AS MUCH DONE AT WORK, SCHOOL OR AT HOME: NONE OF THE TIME
QUESTION_4 LAST FOUR WEEKS HOW OFTEN HAVE YOU USED YOUR RESCUE INHALER OR NEBULIZER MEDICATION (SUCH AS ALBUTEROL): NOT AT ALL
QUESTION_5 LAST FOUR WEEKS HOW WOULD YOU RATE YOUR ASTHMA CONTROL: COMPLETELY CONTROLLED
ACT_TOTALSCORE: 25
QUESTION_3 LAST FOUR WEEKS HOW OFTEN DID YOUR ASTHMA SYMPTOMS (WHEEZING, COUGHING, SHORTNESS OF BREATH, CHEST TIGHTNESS OR PAIN) WAKE YOU UP AT NIGHT OR EARLIER THAN USUAL IN THE MORNING: NOT AT ALL
ACT_TOTALSCORE: 25

## 2023-03-14 ASSESSMENT — PATIENT HEALTH QUESTIONNAIRE - PHQ9
SUM OF ALL RESPONSES TO PHQ QUESTIONS 1-9: 0
10. IF YOU CHECKED OFF ANY PROBLEMS, HOW DIFFICULT HAVE THESE PROBLEMS MADE IT FOR YOU TO DO YOUR WORK, TAKE CARE OF THINGS AT HOME, OR GET ALONG WITH OTHER PEOPLE: NOT DIFFICULT AT ALL
SUM OF ALL RESPONSES TO PHQ QUESTIONS 1-9: 0

## 2023-03-14 ASSESSMENT — PAIN SCALES - GENERAL: PAINLEVEL: NO PAIN (1)

## 2023-03-14 NOTE — NURSING NOTE
Prior to immunization administration, verified patients identity using patient s name and date of birth. Please see Immunization Activity for additional information.     Screening Questionnaire for Adult Immunization    Are you sick today?   No   Do you have allergies to medications, food, a vaccine component or latex?   No   Have you ever had a serious reaction after receiving a vaccination?   No   Do you have a long-term health problem with heart, lung, kidney, or metabolic disease (e.g., diabetes), asthma, a blood disorder, no spleen, complement component deficiency, a cochlear implant, or a spinal fluid leak?  Are you on long-term aspirin therapy?   No   Do you have cancer, leukemia, HIV/AIDS, or any other immune system problem?   No   Do you have a parent, brother, or sister with an immune system problem?   No   In the past 3 months, have you taken medications that affect  your immune system, such as prednisone, other steroids, or anticancer drugs; drugs for the treatment of rheumatoid arthritis, Crohn s disease, or psoriasis; or have you had radiation treatments?   No   Have you had a seizure, or a brain or other nervous system problem?   No   During the past year, have you received a transfusion of blood or blood    products, or been given immune (gamma) globulin or antiviral drug?   No   For women: Are you pregnant or is there a chance you could become       pregnant during the next month?   No   Have you received any vaccinations in the past 4 weeks?   No     Immunization questionnaire answers were all negative.        Per orders of Dr. meyer, injection of hepb and fluzone  given by See Olivarez MA. Patient instructed to remain in clinic for 15 minutes afterwards, and to report any adverse reaction to me immediately.       Screening performed by See Olivarez MA on 3/14/2023 at 8:43 AM.

## 2023-03-14 NOTE — PROGRESS NOTES
Assessment & Plan     Type 2 diabetes mellitus with hyperglycemia, without long-term current use of insulin (H)  Plan: I do recommend to see MTM.  Regarding Gastroenterology issues- we have a separate plan- because it may or may not be related only to trulicity.  Advised to get annual eye exam completed as well  Follow up with me in office in 4 months      - Adult Eye  Referral; Future  - HEMOGLOBIN A1C; Future  - Albumin Random Urine Quantitative with Creat Ratio; Future  - Lipid panel reflex to direct LDL Non-fasting; Future  - Basic metabolic panel  (Ca, Cl, CO2, Creat, Gluc, K, Na, BUN); Future  - atorvastatin (LIPITOR) 20 MG tablet; Take 1 tablet (20 mg) by mouth daily  - lisinopril (ZESTRIL) 2.5 MG tablet; Take 1 tablet (2.5 mg) by mouth daily  - metFORMIN (GLUCOPHAGE XR) 500 MG 24 hr tablet; Take 2 tablets (1,000 mg) by mouth 2 times daily (with meals)  - PRIMARY CARE FOLLOW-UP SCHEDULING; Future    Multiple sclerosis (H)  - considered this problem when making today's plans  - no interventions today       -followed by specialist.    Bipolar 2 disorder (H)  - considered this problem when making today's plans  - no interventions today       -stable without medications     Irritable bowel syndrome with both constipation and diarrhea  Plan: Please see patient instructions   - Adult GI  Referral - Consult Only; Future  - famotidine (PEPCID) 20 MG tablet; Take 1 tablet (20 mg) by mouth 2 times daily  - dicyclomine (BENTYL) 20 MG tablet; Take 1 tablet (20 mg) by mouth 3 times daily as needed (bloating, stomach cramping)    Class 3 severe obesity due to excess calories with serious comorbidity and body mass index (BMI) of 45.0 to 49.9 in adult (H)  Attempting to loose weight    Encounter for screening mammogram for malignant neoplasm of breast    - MA Screen Bilateral w/Donald; Future    Screen for colon cancer  colon cancer screening options discussed & pt prefers cologarud over colonoscopy  -  "KATELYNN(EX[employer] SCI[employer]ES); Future    Need for vaccination    - INFLUENZA VACCINE IM > 6 MONTHS VALENT IIV4 (AFLURIA/FLUZONE)  - HEPATITIS B VACCINE ADULT 3 DOSE IM (ENGERIX-B/RECOMBIVAX HB)  - Hepatitis B surface antigen; Future  - Hepatitis B Surface Antibody; Future    Ordering of each unique test  Prescription drug management  I spent a total of 35 minutes on the day of the visit.   Time spent doing chart review, history and exam, documentation and further activities per the note       BMI:   Estimated body mass index is 46.09 kg/m  as calculated from the following:    Height as of this encounter: 1.533 m (5' 0.35\").    Weight as of this encounter: 108.3 kg (238 lb 12.8 oz).   Weight management plan: Discussed healthy diet and exercise guidelines        Return in about 4 months (around 7/14/2023) for BP Recheck/ HTN, DM.   Follow-up Visit   Expected date:  Jun 14, 2023 (Approximate)      Follow Up Appointment Details:     Follow-up with whom?: Me    Follow-Up for what?: Chronic Disease f/u    Chronic Disease f/u:  Diabetes  Hyperlipidemia  Hypertension       How?: In Person                    Shivani Clemons MD  [facility]    Subjective April is a 43 year old{  presenting for the following health issues:  Diabetes      History of Present Illness       Diabetes:   She presents for follow up of diabetes.  She is checking home blood glucose two times daily. She checks blood glucose at bedtime.  Blood glucose is sometimes over 200 and never under 70. When her blood glucose is low, the patient is asymptomatic for confusion, blurred vision, lethargy and reports not feeling dizzy, shaky, or weak.  She is concerned about other. She is not experiencing numbness or burning in feet, excessive thirst, blurry vision, weight changes or redness, sores or blisters on feet. The patient has not had a diabetic eye exam in the last 12 months.         She eats 2-3 servings of fruits and vegetables daily.She " "consumes 0 sweetened beverage(s) daily.She exercises with enough effort to increase her heart rate 9 or less minutes per day.  She exercises with enough effort to increase her heart rate 3 or less days per week.   She is taking medications regularly.    Today's PHQ-9         PHQ-9 Total Score: 0    PHQ-9 Q9 Thoughts of better off dead/self-harm past 2 weeks :   Not at all    How difficult have these problems made it for you to do your work, take care of things at home, or get along with other people: Not difficult at all  Today's SHANA-7 Score: 0   SMBG-twice daily   145 am/premeals - today.  Previously has been higher- average 18.    Pre-dinner 100-130.  Has not used Kay sensor for > 1 month.  Its expensive and less accurate because it lags behind.    trulicity still causing bloating upper and lower ab pain, gas, some food cause more sensitive stomach. Avoiding lactose, gluten helps  trulicity better managable than ozempic- thouh ozempic seem to have helped with Diabetes  Better.  Tried Fodmap diet as well  Gluten free diet right now.  pepcid over the counter helps reduce \" the bile\" heart burn and uses as needed .  dibetic diet always     Also gets  alternating constipation, loose stools, bloating      Works FT and student FT-Educational doctorate program.  Review of Systems   Constitutional, HEENT, cardiovascular, pulmonary, GI, , musculoskeletal, neuro, skin, endocrine and psych systems are negative, except as otherwise noted.      Objective    /89   Pulse 90   Temp 99.2  F (37.3  C) (Tympanic)   Resp 18   Ht 1.533 m (5' 0.35\")   Wt 108.3 kg (238 lb 12.8 oz)   LMP 02/24/2023 (Within Weeks)   SpO2 99%   BMI 46.09 kg/m    Body mass index is 46.09 kg/m .  Physical Exam   GENERAL: healthy, alert and no distress  NECK: no adenopathy, no asymmetry, masses, or scars and thyroid normal to palpation  RESP: lungs clear to auscultation - no rales, rhonchi or wheezes  CV: regular rate and rhythm, normal S1 " S2, no S3 or S4, no murmur, click or rub, no peripheral edema and peripheral pulses strong  ABDOMEN: soft, nontender, no hepatosplenomegaly, no masses and bowel sounds normal  MS: no gross musculoskeletal defects noted, no edema  Diabetic foot exam: normal DP and PT pulses, no trophic changes or ulcerative lesions, normal sensory exam and normal monofilament exam    No results found for this or any previous visit (from the past 24 hour(s)).

## 2023-03-14 NOTE — PATIENT INSTRUCTIONS
Irritable bowel syndrome  Plan: Maintain food diary, and avoid food that cause discomfort, flatulence or constipation.Add fluids and fiber to the diet.  Over the counter metamucil as needed  To relieve constipation with adequate fluids.  May need colonoscopy if on going discomfort, weight changes.   Gastroenterology consult for unresolved or worsening symptoms   Return office visit as discussed in clinic today     See MTM  Will inform you with labs  Follow up with me in 4 months

## 2023-04-16 ENCOUNTER — HEALTH MAINTENANCE LETTER (OUTPATIENT)
Age: 43
End: 2023-04-16

## 2023-04-17 DIAGNOSIS — E11.65 TYPE 2 DIABETES MELLITUS WITH HYPERGLYCEMIA, WITHOUT LONG-TERM CURRENT USE OF INSULIN (H): ICD-10-CM

## 2023-04-17 RX ORDER — DULAGLUTIDE 1.5 MG/.5ML
1.5 INJECTION, SOLUTION SUBCUTANEOUS
Qty: 6 ML | Refills: 0 | Status: SHIPPED | OUTPATIENT
Start: 2023-04-17 | End: 2023-11-07

## 2023-05-22 ENCOUNTER — PATIENT OUTREACH (OUTPATIENT)
Dept: CARE COORDINATION | Facility: CLINIC | Age: 43
End: 2023-05-22
Payer: COMMERCIAL

## 2023-06-05 ENCOUNTER — ANCILLARY PROCEDURE (OUTPATIENT)
Dept: MAMMOGRAPHY | Facility: CLINIC | Age: 43
End: 2023-06-05
Attending: FAMILY MEDICINE
Payer: COMMERCIAL

## 2023-06-05 DIAGNOSIS — Z12.31 ENCOUNTER FOR SCREENING MAMMOGRAM FOR MALIGNANT NEOPLASM OF BREAST: ICD-10-CM

## 2023-06-05 PROCEDURE — 77063 BREAST TOMOSYNTHESIS BI: CPT | Mod: TC | Performed by: RADIOLOGY

## 2023-06-05 PROCEDURE — 77067 SCR MAMMO BI INCL CAD: CPT | Mod: TC | Performed by: RADIOLOGY

## 2023-06-06 ENCOUNTER — PATIENT OUTREACH (OUTPATIENT)
Dept: CARE COORDINATION | Facility: CLINIC | Age: 43
End: 2023-06-06
Payer: COMMERCIAL

## 2023-06-20 ENCOUNTER — VIRTUAL VISIT (OUTPATIENT)
Dept: FAMILY MEDICINE | Facility: CLINIC | Age: 43
End: 2023-06-20
Payer: COMMERCIAL

## 2023-06-20 DIAGNOSIS — Z12.11 SCREEN FOR COLON CANCER: ICD-10-CM

## 2023-06-20 DIAGNOSIS — Z30.41 FAMILY PLANNING, BCP (BIRTH CONTROL PILLS) MAINTENANCE: ICD-10-CM

## 2023-06-20 DIAGNOSIS — F31.81 BIPOLAR 2 DISORDER (H): Primary | ICD-10-CM

## 2023-06-20 DIAGNOSIS — E11.65 TYPE 2 DIABETES MELLITUS WITH HYPERGLYCEMIA, WITHOUT LONG-TERM CURRENT USE OF INSULIN (H): ICD-10-CM

## 2023-06-20 PROCEDURE — 99214 OFFICE O/P EST MOD 30 MIN: CPT | Mod: VID | Performed by: FAMILY MEDICINE

## 2023-06-20 RX ORDER — NORGESTREL AND ETHINYL ESTRADIOL 0.3-0.03MG
KIT ORAL
Qty: 90 TABLET | Refills: 3 | Status: SHIPPED | OUTPATIENT
Start: 2023-06-20 | End: 2024-05-28

## 2023-06-20 RX ORDER — BUPROPION HYDROCHLORIDE 150 MG/1
150 TABLET ORAL EVERY MORNING
Qty: 90 TABLET | Refills: 0 | Status: SHIPPED | OUTPATIENT
Start: 2023-06-20 | End: 2023-07-20

## 2023-06-20 ASSESSMENT — PATIENT HEALTH QUESTIONNAIRE - PHQ9
SUM OF ALL RESPONSES TO PHQ QUESTIONS 1-9: 13
10. IF YOU CHECKED OFF ANY PROBLEMS, HOW DIFFICULT HAVE THESE PROBLEMS MADE IT FOR YOU TO DO YOUR WORK, TAKE CARE OF THINGS AT HOME, OR GET ALONG WITH OTHER PEOPLE: SOMEWHAT DIFFICULT
SUM OF ALL RESPONSES TO PHQ QUESTIONS 1-9: 13

## 2023-06-20 ASSESSMENT — ANXIETY QUESTIONNAIRES
3. WORRYING TOO MUCH ABOUT DIFFERENT THINGS: SEVERAL DAYS
GAD7 TOTAL SCORE: 7
GAD7 TOTAL SCORE: 7
IF YOU CHECKED OFF ANY PROBLEMS ON THIS QUESTIONNAIRE, HOW DIFFICULT HAVE THESE PROBLEMS MADE IT FOR YOU TO DO YOUR WORK, TAKE CARE OF THINGS AT HOME, OR GET ALONG WITH OTHER PEOPLE: SOMEWHAT DIFFICULT
5. BEING SO RESTLESS THAT IT IS HARD TO SIT STILL: NOT AT ALL
7. FEELING AFRAID AS IF SOMETHING AWFUL MIGHT HAPPEN: NOT AT ALL
6. BECOMING EASILY ANNOYED OR IRRITABLE: SEVERAL DAYS
2. NOT BEING ABLE TO STOP OR CONTROL WORRYING: MORE THAN HALF THE DAYS
4. TROUBLE RELAXING: MORE THAN HALF THE DAYS
8. IF YOU CHECKED OFF ANY PROBLEMS, HOW DIFFICULT HAVE THESE MADE IT FOR YOU TO DO YOUR WORK, TAKE CARE OF THINGS AT HOME, OR GET ALONG WITH OTHER PEOPLE?: SOMEWHAT DIFFICULT
7. FEELING AFRAID AS IF SOMETHING AWFUL MIGHT HAPPEN: NOT AT ALL
1. FEELING NERVOUS, ANXIOUS, OR ON EDGE: SEVERAL DAYS
GAD7 TOTAL SCORE: 7

## 2023-06-20 NOTE — PROGRESS NOTES
April is a 43 year old who is being evaluated via a billable video visit.      How would you like to obtain your AVS? MyChart  If the video visit is dropped, the invitation should be resent by: Text to cell phone: [phone]  Will anyone else be joining your video visit? No          Assessment & Plan     Bipolar 2 disorder (HCC)  Added   - buPROPion (WELLBUTRIN XL) 150 MG 24 hr tablet; Take 1 tablet (150 mg) by mouth every morning  She benefited from  wellbutrin in past and willing to resume  Potential medication side effects were discussed with the patient; let me know if any occur.  Follow up in 4 weeks      Type 2 diabetes mellitus with hyperglycemia, without long-term current use of insulin (H)  MTM follow up is recommenced   Lab Results   Component Value Date    A1C 7.1 03/14/2023    A1C 6.6 06/20/2022    A1C 8.0 02/15/2022    A1C 6.2 07/18/2019    A1C 6.1 10/01/2018    A1C 6.5 05/04/2018    A1C 7.1 01/15/2018    A1C 7.3 06/08/2015       - Adult Eye  Referral; Future/ advised annual visit  - dulaglutide (TRULICITY) 1.5 MG/0.5ML pen; Inject 3 mg Subcutaneous every 7 days    She does not need refill on metformin , lisinopril, atorvastatin.  Follow up every 3-4 months     Screen for colon cancer  - Colonoscopy Screening  Referral; Future  She is willing to do colonoscopy for colon cancer screening    Family planning, BCP (birth control pills) maintenance  Refill on oral contraceptive pills given    - norgestrel-ethinyl estradiol (LOW-OGESTREL) 0.3-30 MG-MCG tablet; TAKE 1 TABLET BY MOUTH EVERY DAY.        Ordering of each unique test  Prescription drug management  I spent a total of 30 minutes on the day of the visit.   Time spent by me doing chart review, history and exam, documentation and further activities per the note       Depression Screening Follow Up        6/20/2023     1:10 PM   PHQ   PHQ-9 Total Score 13   Q9: Thoughts of better off dead/self-harm past 2 weeks Not at all          6/20/2023     1:10 PM   Last PHQ-9   1.  Little interest or pleasure in doing things 2   2.  Feeling down, depressed, or hopeless 2   3.  Trouble falling or staying asleep, or sleeping too much 1   4.  Feeling tired or having little energy 2   5.  Poor appetite or overeating 1   6.  Feeling bad about yourself 1   7.  Trouble concentrating 2   8.  Moving slowly or restless 2   Q9: Thoughts of better off dead/self-harm past 2 weeks 0   PHQ-9 Total Score 13       Follow Up Actions Taken  Crisis resource information provided in After Visit Summary  Adjusted patient's anti-depressant medication.         Shivani Clemons MD  [facility]    Subjective April is a 43 year old, presenting for the following health issues:  Recheck Medication and Mental Health Problem         View : No data to display.              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  Vacation planned in august.  Trying to balance all balls in air and keeping them up.    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  Also feels depressed about health  Unable to loose weight - does not matter what she does  Feels doing the best she can.  ozempic helped but had to be stopped- GI intolerance to higher doses.  Diabetes well controlled on lower doses.    Has been on trulicity- occasional vomit better tolerated than ozempic . SMBG- twice daily  In am 160-180 premeals   premeals / pm 120-140      Known history of bipolar depression and anxiety  wants to be back on medications   Went through [facility]  and had 2 months in patient program .  Bupropion helped the most  Dad was bipolar too.  buprion helped  smal dose of lithium was also given back in 2009  Went off all medications  Work stress always trigger the worsneing of anxiety  praiar care- again prescribed lithium but that makes her feel worse.  Does not feel has fransico Or Hypomanic          Review of Systems   Constitutional, HEENT, cardiovascular, pulmonary, GI, , musculoskeletal, neuro, skin, endocrine and psych systems are negative, except as otherwise noted.      Objective           Vitals:  No vitals were obtained today due to virtual visit.    Physical Exam   GENERAL: Healthy, alert and no distress  EYES: Eyes grossly normal to inspection.  No discharge or erythema, or obvious scleral/conjunctival abnormalities.  RESP: No audible wheeze, cough, or visible cyanosis.  No visible retractions or increased work of breathing.    SKIN: Visible skin clear. No significant rash, abnormal pigmentation or lesions.  NEURO: Cranial nerves grossly intact.  Mentation and speech appropriate for age.  PSYCH: Mentation appears normal, affect normal/bright, judgement and insight intact, normal speech and appearance well-groomed.            Video-Visit Details    Type of service:  Video Visit     Originating Location (pt. Location): Home  Distant Location (provider location):  On-site  Platform used for Video Visit: Kiesha

## 2023-06-22 ENCOUNTER — TELEPHONE (OUTPATIENT)
Dept: PHARMACY | Facility: CLINIC | Age: 43
End: 2023-06-22
Payer: COMMERCIAL

## 2023-06-22 NOTE — TELEPHONE ENCOUNTER
This patient is due for MT follow-up. I called the patient to schedule an appointment and left a message with the clinic phone number for the patient to call to schedule.    Fanny Mccormick, PharmD  Medication Therapy Management Resident  [phone]

## 2023-07-20 ENCOUNTER — VIRTUAL VISIT (OUTPATIENT)
Dept: FAMILY MEDICINE | Facility: CLINIC | Age: 43
End: 2023-07-20
Payer: COMMERCIAL

## 2023-07-20 DIAGNOSIS — F31.81 BIPOLAR 2 DISORDER (H): ICD-10-CM

## 2023-07-20 DIAGNOSIS — Z12.11 SCREEN FOR COLON CANCER: ICD-10-CM

## 2023-07-20 DIAGNOSIS — E11.65 TYPE 2 DIABETES MELLITUS WITH HYPERGLYCEMIA, WITHOUT LONG-TERM CURRENT USE OF INSULIN (H): Primary | ICD-10-CM

## 2023-07-20 PROCEDURE — 96127 BRIEF EMOTIONAL/BEHAV ASSMT: CPT | Mod: VID | Performed by: FAMILY MEDICINE

## 2023-07-20 PROCEDURE — 99214 OFFICE O/P EST MOD 30 MIN: CPT | Mod: VID | Performed by: FAMILY MEDICINE

## 2023-07-20 RX ORDER — DULAGLUTIDE 4.5 MG/.5ML
4.5 INJECTION, SOLUTION SUBCUTANEOUS WEEKLY
Qty: 2 ML | Status: CANCELLED
Start: 2023-07-20

## 2023-07-20 RX ORDER — DULAGLUTIDE 3 MG/.5ML
3 INJECTION, SOLUTION SUBCUTANEOUS WEEKLY
Qty: 2 ML | Refills: 0 | Status: CANCELLED | OUTPATIENT
Start: 2023-07-20

## 2023-07-20 RX ORDER — BUPROPION HYDROCHLORIDE 300 MG/1
300 TABLET ORAL EVERY MORNING
Qty: 90 TABLET | Refills: 1 | Status: SHIPPED | OUTPATIENT
Start: 2023-07-20 | End: 2023-11-07

## 2023-07-20 ASSESSMENT — ANXIETY QUESTIONNAIRES
2. NOT BEING ABLE TO STOP OR CONTROL WORRYING: MORE THAN HALF THE DAYS
GAD7 TOTAL SCORE: 8
3. WORRYING TOO MUCH ABOUT DIFFERENT THINGS: MORE THAN HALF THE DAYS
5. BEING SO RESTLESS THAT IT IS HARD TO SIT STILL: NOT AT ALL
IF YOU CHECKED OFF ANY PROBLEMS ON THIS QUESTIONNAIRE, HOW DIFFICULT HAVE THESE PROBLEMS MADE IT FOR YOU TO DO YOUR WORK, TAKE CARE OF THINGS AT HOME, OR GET ALONG WITH OTHER PEOPLE: SOMEWHAT DIFFICULT
GAD7 TOTAL SCORE: 8
1. FEELING NERVOUS, ANXIOUS, OR ON EDGE: SEVERAL DAYS
6. BECOMING EASILY ANNOYED OR IRRITABLE: SEVERAL DAYS
7. FEELING AFRAID AS IF SOMETHING AWFUL MIGHT HAPPEN: NOT AT ALL

## 2023-07-20 ASSESSMENT — PATIENT HEALTH QUESTIONNAIRE - PHQ9
SUM OF ALL RESPONSES TO PHQ QUESTIONS 1-9: 8
5. POOR APPETITE OR OVEREATING: MORE THAN HALF THE DAYS

## 2023-07-20 NOTE — PROGRESS NOTES
April is a 43 year old who is being evaluated via a billable video visit.      How would you like to obtain your AVS? MyChart  If the video visit is dropped, the invitation should be resent by: Text to cell phone: [phone]  Will anyone else be joining your video visit? No          Assessment & Plan     1. Type 2 diabetes mellitus with hyperglycemia, without long-term current use of insulin (H)  Plan: Advised annual eye exam and referral is given.- Adult Eye  Referral; Future  Start - dulaglutide (TRULICITY) 1.5 MG/0.5ML pen; Inject 1.5 mg Subcutaneous every 7 days  Dispense: 6 mL; Refill: 1  Continue with all the medication including metformin and full doses.  Encouraged ongoing diet and exercise.  Annual eye exam is also indicated and referral is given  Does need fasting lab only appointment and labs are future.  - Basic metabolic panel  (Ca, Cl, CO2, Creat, Gluc, K, Na, BUN); Future  - Lipid Profile (Chol, Trig, HDL, LDL calc); Future  -A1c  No refill needed for metformin 1000 mg twice daily to be continued   She should continue with statin atorvastatin 20 mg at bedtime, no refill needed  Her blood pressure today is notably 189/89.  She is currently taking only 2.5 mg of lisinopril.  Continue to monitor it closely and we may have to increase the dose in 3 months follow-up if the blood pressure is still borderline    2. Bipolar 2 disorder (HCC)  Plan;  - buPROPion (WELLBUTRIN XL) 300 MG 24 hr tablet; Take 1 tablet (300 mg) by mouth every morning  Dispense: 90 tablet; Refill: 1  - EMOTIONAL / BEHAVIORAL ASSESSMENT    3. Screen for colon cancer  Plan:  - Colonoscopy Screening  Referral; Future      Ordering of each unique test  Prescription drug management  I spent a total of 39 minutes on the day of the visit.   Time spent by me doing chart review, history and exam, documentation and further activities per the note           Shivani Clemons MD  [facility]    Subjective    April is a 43 year old, presenting for the following health issues:  Recheck Medication         No data to display              HPI   Follow-up on diabetes.  She has noticed that her blood sugar do run a little bit high.  She is tolerating metformin quite well.  She is considering and willing to start with injectable medication in fact she is asking to be started on Trulicity.    160-180/ am pre meals  During the day- activity- SMBG-is normal range and in eveing usually in 100-120    Review of Systems   Constitutional, HEENT, cardiovascular, pulmonary, GI, , musculoskeletal, neuro, skin, endocrine and psych systems are negative, except as otherwise noted.      Objective           Vitals:  No vitals were obtained today due to virtual visit.    Physical Exam   GENERAL: Healthy, alert and no distress  EYES: Eyes grossly normal to inspection.  No discharge or erythema, or obvious scleral/conjunctival abnormalities.  RESP: No audible wheeze, cough, or visible cyanosis.  No visible retractions or increased work of breathing.    SKIN: Visible skin clear. No significant rash, abnormal pigmentation or lesions.  NEURO: Cranial nerves grossly intact.  Mentation and speech appropriate for age.  PSYCH: Mentation appears normal, affect normal/bright, judgement and insight intact, normal speech and appearance well-groomed.      3/14/2023     7:50 AM 6/20/2023     1:10 PM 7/20/2023     1:53 PM   PHQ   PHQ-9 Total Score 0 13 8   Q9: Thoughts of better off dead/self-harm past 2 weeks Not at all Not at all Not at all          3/14/2023     7:51 AM 6/20/2023     1:11 PM 7/20/2023     1:53 PM   SHANA-7 SCORE   Total Score 0 (minimal anxiety) 7 (mild anxiety)    Total Score 0 7 8                   Video-Visit Details    Type of service:  Video Visit   Joined the call at 7/20/2023, 1:49:40?pm.  Left the call at 7/20/2023, 2:15:22?pm.  Originating Location (pt. Location): Home    Distant Location (provider location):  On-site  Platform  used for Video Visit: Kies[employer]

## 2023-08-01 DIAGNOSIS — E11.65 TYPE 2 DIABETES MELLITUS WITH HYPERGLYCEMIA, WITHOUT LONG-TERM CURRENT USE OF INSULIN (H): ICD-10-CM

## 2023-08-01 NOTE — TELEPHONE ENCOUNTER
Patient is testing more than twice a day.   Please update prescription.   Patient is leaving tomorrow.   Needs refill asap.   Insurance will not let her refill her Rx early.  Thanks!  Doris MORTON

## 2023-08-01 NOTE — TELEPHONE ENCOUNTER
"Requested Prescriptions   Pending Prescriptions Disp Refills    CONTOUR NEXT TEST test strip 100 strip 4     Sig: Use to test blood sugar 3 times daily or as directed.       Diabetic Supplies Protocol Passed - 8/1/2023  3:03 PM        Passed - Medication is active on med list        Passed - Patient is 18 years of age or older        Passed - Recent (6 mo) or future (30 days) visit within the authorizing provider's specialty     Patient had office visit in the last 6 months or has a visit in the next 30 days with authorizing provider.  See \"Patient Info\" tab in inbasket, or \"Choose Columns\" in Meds & Orders section of the refill encounter.                Prescription approved per [facility] Refill Protocol.     Oliva Arcos RN  [facility]   "

## 2023-09-14 DIAGNOSIS — E11.65 TYPE 2 DIABETES MELLITUS WITH HYPERGLYCEMIA, WITHOUT LONG-TERM CURRENT USE OF INSULIN (H): ICD-10-CM

## 2023-09-14 RX ORDER — ATORVASTATIN CALCIUM 20 MG/1
20 TABLET, FILM COATED ORAL DAILY
Qty: 90 TABLET | Refills: 0 | Status: SHIPPED | OUTPATIENT
Start: 2023-09-14 | End: 2023-11-06

## 2023-09-14 NOTE — TELEPHONE ENCOUNTER
Prescription approved per [facility] Refill Protocol.  Kayy PEREZ RN  [facility]

## 2023-09-17 ENCOUNTER — HEALTH MAINTENANCE LETTER (OUTPATIENT)
Age: 43
End: 2023-09-17

## 2023-10-20 DIAGNOSIS — H35.353 CYSTOID MACULAR EDEMA OF BOTH EYES: Primary | ICD-10-CM

## 2023-10-31 ENCOUNTER — OFFICE VISIT (OUTPATIENT)
Dept: OPHTHALMOLOGY | Facility: CLINIC | Age: 43
End: 2023-10-31
Attending: OPHTHALMOLOGY
Payer: COMMERCIAL

## 2023-10-31 DIAGNOSIS — H35.353 CYSTOID MACULAR EDEMA OF BOTH EYES: Primary | ICD-10-CM

## 2023-10-31 DIAGNOSIS — H35.062 RETINAL VASCULITIS OF LEFT EYE: ICD-10-CM

## 2023-10-31 DIAGNOSIS — H35.372 EPIRETINAL MEMBRANE (ERM) OF LEFT EYE: ICD-10-CM

## 2023-10-31 DIAGNOSIS — H30.23 PARS PLANITIS OF BOTH EYES: ICD-10-CM

## 2023-10-31 PROCEDURE — 99212 OFFICE O/P EST SF 10 MIN: CPT | Mod: 25 | Performed by: OPHTHALMOLOGY

## 2023-10-31 PROCEDURE — 92235 FLUORESCEIN ANGRPH MLTIFRAME: CPT | Performed by: OPHTHALMOLOGY

## 2023-10-31 PROCEDURE — 99214 OFFICE O/P EST MOD 30 MIN: CPT | Mod: GC | Performed by: OPHTHALMOLOGY

## 2023-10-31 PROCEDURE — 92134 CPTRZ OPH DX IMG PST SGM RTA: CPT | Performed by: OPHTHALMOLOGY

## 2023-10-31 PROCEDURE — 92235 FLUORESCEIN ANGRPH MLTIFRAME: CPT | Mod: 26 | Performed by: OPHTHALMOLOGY

## 2023-10-31 PROCEDURE — 92015 DETERMINE REFRACTIVE STATE: CPT

## 2023-10-31 PROCEDURE — 92081 LIMITED VISUAL FIELD XM: CPT | Performed by: OPHTHALMOLOGY

## 2023-10-31 ASSESSMENT — CONF VISUAL FIELD
OS_SUPERIOR_TEMPORAL_RESTRICTION: 0
OD_INFERIOR_TEMPORAL_RESTRICTION: 0
OD_INFERIOR_NASAL_RESTRICTION: 0
OD_NORMAL: 1
METHOD: COUNTING FINGERS
OD_SUPERIOR_NASAL_RESTRICTION: 0
OS_INFERIOR_NASAL_RESTRICTION: 0
OS_NORMAL: 1
OD_SUPERIOR_TEMPORAL_RESTRICTION: 0
OS_INFERIOR_TEMPORAL_RESTRICTION: 0
OS_SUPERIOR_NASAL_RESTRICTION: 0

## 2023-10-31 ASSESSMENT — REFRACTION_MANIFEST
OS_SPHERE: -3.75
OS_ADD: +1.75
OS_AXIS: 098
OD_CYLINDER: +0.25
OD_ADD: +1.75
OD_SPHERE: -4.25
OS_CYLINDER: +5.50
OD_AXIS: 050

## 2023-10-31 ASSESSMENT — VISUAL ACUITY
OS_CC: 20/80
OD_PH_CC: 20/50-/+2
METHOD: SNELLEN - LINEAR
OD_CC: 20/50-/+
CORRECTION_TYPE: GLASSES

## 2023-10-31 ASSESSMENT — TONOMETRY
OS_IOP_MMHG: X
OD_IOP_MMHG: 17
OS_IOP_MMHG: 10
OD_IOP_MMHG: 18
IOP_METHOD: TONOPEN
IOP_METHOD: TONOPEN

## 2023-10-31 ASSESSMENT — SLIT LAMP EXAM - LIDS
COMMENTS: NORMAL
COMMENTS: NORMAL

## 2023-10-31 ASSESSMENT — REFRACTION_WEARINGRX
OD_AXIS: 055
OS_ADD: +1.75
OD_ADD: +1.75
OS_AXIS: 095
OS_CYLINDER: +5.50
OD_CYLINDER: +0.25
OD_SPHERE: -4.50
OS_SPHERE: -3.25
SPECS_TYPE: PAL

## 2023-10-31 ASSESSMENT — EXTERNAL EXAM - LEFT EYE: OS_EXAM: NORMAL

## 2023-10-31 ASSESSMENT — EXTERNAL EXAM - RIGHT EYE: OD_EXAM: NORMAL

## 2023-10-31 ASSESSMENT — CUP TO DISC RATIO
OS_RATIO: 0.2
OD_RATIO: 0.2

## 2023-10-31 NOTE — PROGRESS NOTES
CC -   Intermediate Uveitis f/u    INTERVAL HISTORY - Initial visit    HPI -   April J Andrei is a  43 year old year-old patient with history of intermediate uveitis and MS lesions found in MRI 2014. She has here today for evaluation of decreased vision left eye and intermediate uveitis.  She is not taking any drops now. Blurred vision for long time and denies worsening over the last 1-2 years    Received interferon for MS because she had MRI which showed many MS lesions. History of interferon use (stopped in 2021 s/s to side effectys); she has been off treatment for MS since 2019    She last saw Dr. Hernandez on 9/2/2021. Pt hasn't noticed changes to her eyes/vision since LV here.  Denies eye pain/discomfort/new flashes/floaters.    She needs DMV forms filled out.       DM II history. 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 Edward)  S/p CE IOL OS 2006  H/o CME OU      RETINAL IMAGING:  OCT 10/31/23  OD - ERM with slight progression; minimal, noncentral INTRARETINAL FLUID improved from previous, PVD, no significant vit debris in scan  OS - ERM with significant  improvement in INTRARETINAL FLUID and resolution of SUBRETINAL FLUID compared to 2021, no significant vit debris in scan and PVD    FA 10/31/23  Right eye - Disc leakage, small vessel leakage and trace macular leakage  Left eye - (Transit) normal transit with disc, small and large vessel as well as macular leakage      ASSESSMENT & PLAN  # Intermediate uveitis with cystoid macular edema, left eye              - cystoid macular edema improved compared to 9/02/2021; no cell in vitreous               - diffuse peripheral and central vascular leakage in FA but fovea is dry; thus, seems like left eye poor vision is not explained by CME (no RAPD to show possible optic neuropathy in the setting of MS): VF test next visit; consider referral to  neuroophthalmology colleagues              - previous history of ocular steroid and oral prednisone use with steroid response.               - patient does NOT want to use steroids again       # Uveitic vs steroid responder Glaucoma               - s/p Trab OU in 2006 (Dr. Edward)              - IOP is good today each eye     # Epiretinal membrane LEFT EYE>RIGHT EYE              - stable; non central       # PCIOL left eye              - s/p yag capsulotomy; stable; observe      # PSC RIGHT EYE              - Visually significant, patient wishes to observe for now as she uses this eye for reading for doctoral program             # Multiple Sclerosis              - no symptoms; recommended to continue follow with Neurology    # PVD left eye    - large smart ring; not central     return to clinic: refer to Dr. Kc   RTC 2-3 months with me for DFE and OCT ou and VF 24-2    Salbador Pierce MD MPH  Vitreoretinal Fellow PGY-6  [facility]       Complete documentation of historical and exam elements from today's encounter can be found in the full encounter summary report (not reduplicated in this progress note). I personally obtained the chief complaint(s) and history of present illness.  I confirmed and edited as necessary the review of systems, past medical/surgical history, family history, social history, and examination findings as documented by others; and I examined the patient myself. I personally reviewed the relevant tests, images, and reports as documented above. I formulated and edited as necessary the assessment and plan and discussed the findings and management plan with the patient and family.     Harry More MD

## 2023-11-05 DIAGNOSIS — E11.65 TYPE 2 DIABETES MELLITUS WITH HYPERGLYCEMIA, WITHOUT LONG-TERM CURRENT USE OF INSULIN (H): ICD-10-CM

## 2023-11-06 RX ORDER — ATORVASTATIN CALCIUM 20 MG/1
20 TABLET, FILM COATED ORAL DAILY
Qty: 90 TABLET | Refills: 0 | Status: SHIPPED | OUTPATIENT
Start: 2023-11-06 | End: 2024-01-18

## 2023-11-07 ENCOUNTER — VIRTUAL VISIT (OUTPATIENT)
Dept: PHARMACY | Facility: CLINIC | Age: 43
End: 2023-11-07
Payer: COMMERCIAL

## 2023-11-07 ENCOUNTER — LAB (OUTPATIENT)
Dept: LAB | Facility: CLINIC | Age: 43
End: 2023-11-07
Payer: COMMERCIAL

## 2023-11-07 ENCOUNTER — TELEPHONE (OUTPATIENT)
Dept: FAMILY MEDICINE | Facility: CLINIC | Age: 43
End: 2023-11-07

## 2023-11-07 DIAGNOSIS — E11.65 TYPE 2 DIABETES MELLITUS WITH HYPERGLYCEMIA, WITHOUT LONG-TERM CURRENT USE OF INSULIN (H): ICD-10-CM

## 2023-11-07 DIAGNOSIS — G44.209 TENSION HEADACHE: ICD-10-CM

## 2023-11-07 DIAGNOSIS — J45.20 MILD INTERMITTENT ASTHMA WITHOUT COMPLICATION: ICD-10-CM

## 2023-11-07 DIAGNOSIS — R93.0 ABNORMAL MRI OF HEAD: ICD-10-CM

## 2023-11-07 DIAGNOSIS — G35 MULTIPLE SCLEROSIS (H): ICD-10-CM

## 2023-11-07 DIAGNOSIS — F31.81 BIPOLAR 2 DISORDER (H): Primary | ICD-10-CM

## 2023-11-07 DIAGNOSIS — Z78.9 TAKES DIETARY SUPPLEMENTS: ICD-10-CM

## 2023-11-07 DIAGNOSIS — F31.81 BIPOLAR 2 DISORDER (H): ICD-10-CM

## 2023-11-07 DIAGNOSIS — Z30.09 ENCOUNTER FOR OTHER GENERAL COUNSELING OR ADVICE ON CONTRACEPTION: ICD-10-CM

## 2023-11-07 DIAGNOSIS — I10 BENIGN ESSENTIAL HYPERTENSION: ICD-10-CM

## 2023-11-07 DIAGNOSIS — E11.65 TYPE 2 DIABETES MELLITUS WITH HYPERGLYCEMIA, WITHOUT LONG-TERM CURRENT USE OF INSULIN (H): Primary | ICD-10-CM

## 2023-11-07 DIAGNOSIS — E78.5 HYPERLIPIDEMIA LDL GOAL <70: ICD-10-CM

## 2023-11-07 LAB
ANION GAP SERPL CALCULATED.3IONS-SCNC: 15 MMOL/L (ref 7–15)
BUN SERPL-MCNC: 15.7 MG/DL (ref 6–20)
CALCIUM SERPL-MCNC: 9.7 MG/DL (ref 8.6–10)
CHLORIDE SERPL-SCNC: 100 MMOL/L (ref 98–107)
CHOLEST SERPL-MCNC: 212 MG/DL
CREAT SERPL-MCNC: 0.59 MG/DL (ref 0.51–0.95)
DEPRECATED HCO3 PLAS-SCNC: 22 MMOL/L (ref 22–29)
EGFRCR SERPLBLD CKD-EPI 2021: >90 ML/MIN/1.73M2
GLUCOSE SERPL-MCNC: 140 MG/DL (ref 70–99)
HBA1C MFR BLD: 6.9 % (ref 0–5.6)
HDLC SERPL-MCNC: 47 MG/DL
LDLC SERPL CALC-MCNC: 139 MG/DL
NONHDLC SERPL-MCNC: 165 MG/DL
POTASSIUM SERPL-SCNC: 3.7 MMOL/L (ref 3.4–5.3)
SODIUM SERPL-SCNC: 137 MMOL/L (ref 135–145)
TRIGL SERPL-MCNC: 128 MG/DL

## 2023-11-07 PROCEDURE — 99605 MTMS BY PHARM NP 15 MIN: CPT | Mod: VID | Performed by: PHARMACIST

## 2023-11-07 PROCEDURE — 80061 LIPID PANEL: CPT

## 2023-11-07 PROCEDURE — 83036 HEMOGLOBIN GLYCOSYLATED A1C: CPT

## 2023-11-07 PROCEDURE — 80048 BASIC METABOLIC PNL TOTAL CA: CPT

## 2023-11-07 PROCEDURE — 99607 MTMS BY PHARM ADDL 15 MIN: CPT | Mod: VID | Performed by: PHARMACIST

## 2023-11-07 PROCEDURE — 36415 COLL VENOUS BLD VENIPUNCTURE: CPT

## 2023-11-07 NOTE — PROGRESS NOTES
Medication Therapy Management (MTM) Encounter    ASSESSMENT:                            Medication Adherence/Access: See below    Diabetes   A1c at goal. Tolerating Trulicity better than other GLP1 agents. However, still having some side effects. However, she would like to continue vs. Changing to another agent at this time.   UACR at goal at last check. Eye/foot exams UTD. Would benefit from updating vaccines.     Mental Health  Would benefit from referral to [facility].     Hypertension   Stable    Hyperlipidemia   Lipids pending at time of visit. At last check her LDL was just above her goal of <100mg/dL.     MS:   Would benefit from re-establishing care with neurology and MRI to look for disease progression (she is symptomatically stable at this time).     Headache:   Stable    Birth Control:   Stable     SOB:   Stable     Supplements:   Support plan to restart vitamin D    PLAN:                            Please send a message about needing an MRI and neurology referral to Dr. Clemons. You could also call the MS clinic at [facility] directly to schedule at [phone] (they can coordinate the MRI prior to your appointment).   I will work with Dr. Clemons to put in a referral for our collaborative care psychiatry services - where you see a psychiatrist for a few visits, get a plan and then Dr. Clemons/myself follow-up with you  When you can, please go to your pharmacy and get your COVID and influenza vaccines.   If the side effects from Trulicity become too much, we can talk about other medications.   Make sure to restart your vitamin D (important for helping delay progression of MS)  Consider using pillboxes to help decrease the burden of taking meds every day (less bottles to open). You can find weekly or monthly pill boxes.     Follow-up: I will check in with you in the next few days once the rest of your labs come back.     SUBJECTIVE/OBJECTIVE:                          Alejandra Forbes is a 43 year old female  contacted via secure video for a follow-up visit from 2022.       Reason for visit: annual med review.    Allergies/ADRs: Reviewed in chart  Past Medical History: Reviewed in chart  Tobacco: She reports that she has never smoked. She has never used smokeless tobacco.  Alcohol: 1-3 beverages / week    Medication Adherence/Access: Struggles to remember lisinopril and atorvastatin (but no concerns with Metformin). Reports she needs to store them together. Open to idea of using pill boxes. Just doesn't take them, knows she should, but doesn't. Not an issue of remembering.     Diabetes   Trulicity 1.5mg weekly  Metformin ER 500mg 2 tabs twice daily   Has found that Trulicity has a much more favorable side effect profile compared to Victoza and Ozempic. She has noticed a decreased appetite. Has noticed if she has more than two meals/day she gets nauseated. Feels like weight has been stable at 225.  She has noticed some more heartburn for which she has been using famotidine with good efficacy.   Blood sugar monitorin-2 time(s) daily; Ranges: (patient reported) Fasting-160-200 fasting, after work always under 120. Maybe in the last two months has seen readings around 75mg/dL. She has had no symptoms of hypoglycemia.      Eye exam is up to date  Foot exam is up to date  Urine Albumin:   Lab Results   Component Value Date    ALCR  2023      Comment:      Unable to calculate, urine albumin and/or urine creatinine is outside detectable limits.  Microalbuminuria is defined as an albumin:creatinine ratio of 17 to 299 for males and 25 to 299 for females. A ratio of albumin:creatinine of 300 or higher is indicative of overt proteinuria.  Due to biologic variability, positive results should be confirmed by a second, first-morning random or 24-hour timed urine specimen. If there is discrepancy, a third specimen is recommended. When 2 out of 3 results are in the microalbuminuria range, this is evidence for incipient  nephropathy and warrants increased efforts at glucose control, blood pressure control, and institution of therapy with an angiotensin-converting-enzyme (ACE) inhibitor (if the patient can tolerate it).        Lab Results   Component Value Date    A1C 6.9 (H) 11/07/2023       Mental Health  Not currently taking anything - had been on bupropion and felt good when she was on 150mg daily but then increased to 300mg and actually felt more depressed.  She stopped on her own and isn't sure about restarting.   Patient reports the following stressors: working full time as well as completing a doctoral program.   She is open to a referral to psychiatry, but worried about how long it would take for her to get in.      Hypertension   Lisinopril 2.5mg daily (had previously been on higher doses but felt like the were too much)  Patient reports no current medication side effects.  Patient does not self-monitor blood pressure.       BP Readings from Last 3 Encounters:   03/14/23 139/89   06/20/22 121/85   02/15/22 (!) 157/107     Pulse Readings from Last 3 Encounters:   03/14/23 90   06/20/22 93   02/15/22 99     Hyperlipidemia     Atorvastatin 20mg daily  Patient reports no significant myalgias or other side effects.     Recent Labs   Lab Test 03/14/23  0908 02/28/22  0754   CHOL 174 139   HDL 40* 43*   * 69   TRIG 97 136     MS:   No on DMD/treatment (history of copaxone and rebif).    Recently seen by optho who noted decrease in her left vision that they felt like was neurological in nature. Suggested that she follow-up with neurophthalmology. She is concerned she may need an MRI and neurology appointment (last MRI was in 2018, believes last neurology visit was around then as well)    Headache:   Ibuprofen 400 - 600 mg daily as needed for a headache (once in a great while).   No issues reported.      Birth Control:   Low-ogestrel daily.   No issues reported. .     SOB:   Albuterol MDI.    Only uses this when she has  bronchitis. No use in a long time.      Supplements:   Vitamin D - but has stopped taking, interested in restarting.   Levels have not been checked since 2019.   ----------------  I spent 28 minutes with this patient today. All changes were made via collaborative practice agreement with Shivani Clemons MD. A copy of the visit note was provided to the patient's provider(s).    A summary of these recommendations was sent via [employer].    Damien NobleD, GA, BCACP  MTM Pharmacist, [facility]     Telemedicine Visit Details  Type of service:  Video Conference via [place]  Joined the call at 11/7/2023, 3:05:15 pm.  Left the call at 11/7/2023, 3:33:37 pm.  You were on the call for 28 minutes 21 seconds .       Medication Therapy Recommendations  No medication therapy recommendations to display

## 2023-11-08 NOTE — PATIENT INSTRUCTIONS
"Recommendations from today's MTM visit:                                                      Please send a message about needing an MRI and neurology referral to Dr. Clemons. You could also call the MS clinic at [facility] directly to schedule at [phone] (they can coordinate the MRI prior to your appointment).   I will work with Dr. Clemons to put in a referral for our collaborative care psychiatry services - where you see a psychiatrist for a few visits, get a plan and then Dr. Clemons/myself follow-up with you  When you can, please go to your pharmacy and get your COVID and influenza vaccines.   If the side effects from Trulicity become too much, we can talk about other medications.   Make sure to restart your vitamin D (important for helping delay progression of MS)    Follow-up: I will check in with you in the next few days once the rest of your labs come back.     It was great speaking with you today.  I value your experience and would be very thankful for your time in providing feedback in our clinic survey. In the next few days, you may receive an email or text message from [employer] with a link to a survey related to your  clinical pharmacist.\"     To schedule another MTM appointment, please call the clinic directly or you may call the MTM scheduling line at [phone] or toll-free at [phone].     My Clinical Pharmacist's contact information:                                                      Please feel free to contact me with any questions or concerns you have.      Cherelle Almanzar, Pharm.D., M.B.A., [facility]  MTM Pharmacist, [facility]  Pager: [phone]  Email: [email or web address]      "

## 2023-11-08 NOTE — TELEPHONE ENCOUNTER
Absolutely, sure. Thanks for your help  Telephone only appointment in future, we  spoke with on phone about a her concerns and she was expecting MRI brain as well.     1. Bipolar 2 disorder (HCC)  - Adult Mental Health  Referral; Future    2. Multiple sclerosis (H)  - Adult Neurology  Referral; Future    3. Abnormal MRI of head  - MR Brain and Orbits w/o & w Contrast; Future

## 2023-11-08 NOTE — TELEPHONE ENCOUNTER
Jabari Clemons -     I saw April and had a few questions for you -     Increasing bupropion wasn't helpful for her and she's stopped taking it completely. She is interested in seeing psychiatry but concerned about the long wait. I thought given her dx of bipolar it may be good to get her to [facility]. If you agree, can you sign the referral?  I can have her schedule with you.     Additionally she is interested in re-establishing care with neurology for her MS (this was also suggested at her 10/31 ophth appointment due to vision changes in her left eye thought to be neurologic in nature). She would like an MRI prior to going, but I'm am assuming they should order that (rather than you)?  I did let her know that she should call the [facility] MS clinic to schedule, but she may need a referral. Are you willing to sign that one as well? Again, if preferred, I can have her see you.     Thanks! Happy to communicate next steps with April.  Cherelle Almanzar, DamienD, GA, BCACP  MTM Pharmacist, [facility]

## 2023-11-09 NOTE — TELEPHONE ENCOUNTER
RECORDS RECEIVED FROM: internal   REASON FOR VISIT: MS   Date of Appt: 12/12/23   NOTES (FOR ALL VISITS) STATUS DETAILS   OFFICE NOTE from referring provider Internal Dr Shivani Clemons @ [facility] Med:  11/7/23 telephone encounter   OFFICE NOTE from other specialist Internal Dr Harry Angeles @ [facility] Eye:  10/31/23    Nain Cabral NP @ [facility] Neurology:  6/21/18    Dr Fredo Sheffield @ [facility] Neurology:  6/5/18 2/23/16   MEDICATION LIST Internal    IMAGING  (FOR ALL VISITS)     MRI (HEAD, NECK, SPINE) Internal [facility]:  MRI Brain 11/14/23  MRI Brain 6/14/18  MRI Brain 8/10/6  (Additional older images in PACS)

## 2023-11-13 ENCOUNTER — IMMUNIZATION (OUTPATIENT)
Dept: FAMILY MEDICINE | Facility: CLINIC | Age: 43
End: 2023-11-13
Payer: COMMERCIAL

## 2023-11-13 DIAGNOSIS — Z23 HIGH PRIORITY FOR 2019-NCOV VACCINE: ICD-10-CM

## 2023-11-13 DIAGNOSIS — Z23 NEED FOR PROPHYLACTIC VACCINATION AND INOCULATION AGAINST INFLUENZA: Primary | ICD-10-CM

## 2023-11-13 PROCEDURE — 91320 SARSCV2 VAC 30MCG TRS-SUC IM: CPT

## 2023-11-13 PROCEDURE — 90686 IIV4 VACC NO PRSV 0.5 ML IM: CPT

## 2023-11-13 PROCEDURE — 90480 ADMN SARSCOV2 VAC 1/ONLY CMP: CPT

## 2023-11-13 PROCEDURE — 90471 IMMUNIZATION ADMIN: CPT

## 2023-11-14 ENCOUNTER — ANCILLARY PROCEDURE (OUTPATIENT)
Dept: MRI IMAGING | Facility: CLINIC | Age: 43
End: 2023-11-14
Attending: FAMILY MEDICINE
Payer: COMMERCIAL

## 2023-11-14 DIAGNOSIS — R93.0 ABNORMAL MRI OF HEAD: ICD-10-CM

## 2023-11-14 PROCEDURE — A9585 GADOBUTROL INJECTION: HCPCS | Mod: JZ | Performed by: RADIOLOGY

## 2023-11-14 PROCEDURE — 70553 MRI BRAIN STEM W/O & W/DYE: CPT | Mod: GC | Performed by: RADIOLOGY

## 2023-11-14 RX ORDER — GADOBUTROL 604.72 MG/ML
10 INJECTION INTRAVENOUS ONCE
Status: COMPLETED | OUTPATIENT
Start: 2023-11-14 | End: 2023-11-14

## 2023-11-14 RX ADMIN — GADOBUTROL 10 ML: 604.72 INJECTION INTRAVENOUS at 18:26

## 2023-12-04 ENCOUNTER — OFFICE VISIT (OUTPATIENT)
Dept: OPHTHALMOLOGY | Facility: CLINIC | Age: 43
End: 2023-12-04
Attending: OPHTHALMOLOGY
Payer: COMMERCIAL

## 2023-12-04 DIAGNOSIS — H31.402 CHOROIDAL DETACHMENT OF LEFT EYE: ICD-10-CM

## 2023-12-04 DIAGNOSIS — H35.353 CYSTOID MACULAR EDEMA OF BOTH EYES: ICD-10-CM

## 2023-12-04 DIAGNOSIS — G35 MULTIPLE SCLEROSIS (H): ICD-10-CM

## 2023-12-04 DIAGNOSIS — T81.31XA LEAKING OF CONJUNCTIVAL DRAINAGE BLEB: ICD-10-CM

## 2023-12-04 DIAGNOSIS — H44.40 HYPOTONY, LEFT EYE: ICD-10-CM

## 2023-12-04 DIAGNOSIS — H59.89 LEAKING OF CONJUNCTIVAL DRAINAGE BLEB: ICD-10-CM

## 2023-12-04 DIAGNOSIS — H30.23 INTERMEDIATE UVEITIS OF BOTH EYES: Primary | ICD-10-CM

## 2023-12-04 PROCEDURE — 99214 OFFICE O/P EST MOD 30 MIN: CPT | Performed by: OPHTHALMOLOGY

## 2023-12-04 PROCEDURE — 92134 CPTRZ OPH DX IMG PST SGM RTA: CPT | Performed by: OPHTHALMOLOGY

## 2023-12-04 PROCEDURE — 99211 OFF/OP EST MAY X REQ PHY/QHP: CPT | Mod: 25 | Performed by: OPHTHALMOLOGY

## 2023-12-04 RX ORDER — POLYMYXIN B SULFATE AND TRIMETHOPRIM 1; 10000 MG/ML; [USP'U]/ML
1 SOLUTION OPHTHALMIC 4 TIMES DAILY
Qty: 10 ML | Refills: 3 | Status: SHIPPED | OUTPATIENT
Start: 2023-12-04 | End: 2023-12-04

## 2023-12-04 RX ORDER — MOXIFLOXACIN 5 MG/ML
1 SOLUTION/ DROPS OPHTHALMIC
Qty: 3 ML | Refills: 1 | Status: SHIPPED | OUTPATIENT
Start: 2023-12-04 | End: 2024-01-05

## 2023-12-04 ASSESSMENT — VISUAL ACUITY
OS_CC: 20/80
CORRECTION_TYPE: GLASSES
OS_PH_CC: 20/70
METHOD: SNELLEN - LINEAR
OD_CC+: +1-1
OD_CC: 20/30

## 2023-12-04 ASSESSMENT — CONF VISUAL FIELD
OS_SUPERIOR_NASAL_RESTRICTION: 0
METHOD: COUNTING FINGERS
OD_SUPERIOR_NASAL_RESTRICTION: 0
OS_NORMAL: 1
OD_NORMAL: 1
OD_INFERIOR_NASAL_RESTRICTION: 0
OS_INFERIOR_NASAL_RESTRICTION: 0
OS_SUPERIOR_TEMPORAL_RESTRICTION: 0
OD_INFERIOR_TEMPORAL_RESTRICTION: 0
OD_SUPERIOR_TEMPORAL_RESTRICTION: 0
OS_INFERIOR_TEMPORAL_RESTRICTION: 0

## 2023-12-04 ASSESSMENT — EXTERNAL EXAM - RIGHT EYE: OD_EXAM: NORMAL

## 2023-12-04 ASSESSMENT — REFRACTION_WEARINGRX
OD_ADD: +1.75
OD_AXIS: 055
OD_SPHERE: -4.50
OS_SPHERE: -3.25
OS_AXIS: 095
OS_CYLINDER: +5.50
OD_CYLINDER: +0.25
OS_ADD: +1.75
SPECS_TYPE: PAL

## 2023-12-04 ASSESSMENT — CUP TO DISC RATIO
OS_RATIO: 0.3
OD_RATIO: 0.4

## 2023-12-04 ASSESSMENT — TONOMETRY
OD_IOP_MMHG: 16
IOP_METHOD: TONOPEN
OS_IOP_MMHG: 06

## 2023-12-04 ASSESSMENT — SLIT LAMP EXAM - LIDS: COMMENTS: NORMAL

## 2023-12-04 ASSESSMENT — EXTERNAL EXAM - LEFT EYE: OS_EXAM: NORMAL

## 2023-12-04 NOTE — LETTER
12/4/2023       RE: Alejandra Forbes  [address]  [place] [address]     Dear Colleague,    Thank you for referring your patient, Alejandra Forbes, to the [facility] [place] at [facility]. Please see a copy of my visit note below.    Chief Complaint/Presenting Concern: Uveitis evaluation    History of Present Illness:   Alejandra Forbes is a 43 year old patient who presents for evaluation of uveitis from Dr. More.     The uveitis has been present for decades and treatment has included steroid drops, steroid injections and perhaps methotrexate previously. There has not been any use of biologic treatments.She was seen by Dr. More on 10/31/23 who noted diffuse peripheral and central vascular leakage in FA but fovea was dry. Given worsened viison in the left eye that was not explained by CME, patient was referred for this evaluation    April reports some discomfort around the left eye (and some sharp pain for last few weeks) and some flashes from the left eye when laying down at night. No such changes noted in the right eye. She notes that vision seems fuzzier and perhaps decreased in the left eye.    Additional Ocular History:   Previously cystoid macular edema of each eye. Previously treated with intraocular and periocular steroid injections  2.  Glaucoma surgery left eye (Trab each eye 2006 Dr. Edward). History of steroid responder  3. Cataract surgery with lens implant left eye s/p yag capsulotomy  4. ERM left eye> right eye     Relevant Past Medical/Family/Social History:  Multiple Sclerosis followed by Dr. Sheffield. Recent MRI showed some new lesions but none near optic nerves. She has an upcoming appointment on 12/12/23 and plans to discuss treatment of MS at that time. She was on interferon previously from 5369-2679. She stopped it due to side effects of constant flu like symptoms.   Type II DM. A1c 6.9 11/7/23. On Trulicity and  metformin 1000mg 2x/day.   HTN: On lisinopril 2.5mg daily.   HLD: On atorvastatin 20mg daily.     In Doctoral program for Education. Grandmother with Glaucoma    Relevant Review of Systems: Recent cold symptoms. Some balance issues recently and some sharp headache pains. Longstanding fatigue    Laboratory Testing  Routine labs per PCP on 11/7/23:  BMP with elevated glucose but otherwise within normal limits.   Lipids with elevated cholesterol and LDL.      MRI Brain 11/14/23: Impression:     1. Markedly increased size and number of T2 hyperintense lesions throughout the cerebral white matter as described, highly suspicious for a demyelinating etiology.     2. Focus of avid enhancement within the left frontal lobe periventricular white matter, most likely representing an active area of demyelination.     Current eye related medications: None    Retina/Uveitis Imaging:  OCT Spectralis Macula December 4, 2023  right eye: Stable ERM slightly obscuring foveal contour but grossly normal foveal contour. No fluid. No thinning of retinal layers. Normal choroidal thickness.   left eye: ERM. New small pocket of fluid in inner retina nasal to fovea. Improved fluid temporally. New choroidal folds.    Optos FA October 31, 2023  right eye: Vessel tortuosity. Hypoflurescence supratemporal to disc. Diffuse small vessel leakage.  left eye: Vessel tortuosity. Diffuse hyperfluorescent leakage but especially perifoveal and infranasal to disc. Slower filling of inferior venous branches.     Assessment:    1. Intermediate uveitis of both eyes  Right eye no AC Cell, no haze    Left with AC Cell, fine haze    2. Cystoid macular edema of both eyes  Minimal right eye, slightly worse left eye     3. Leaking of conjunctival drainage bleb - Left Eye  Subtle dribble from trabeculectomy bleb    4. Hypotony, left eye  IOP 6 with choroidal folds    5. Choroidal detachment of left eye  Inferiorly from 4-7    6. Multiple sclerosis (H)  Followed by   Yohannes    Plan/Recommendations:  Discussed findings with patient. There is some clinical, OCT, and angiographic features of intermediate uveitis. There is some hypotony maculopathy and inferior choroidal detachment. We will treat to prevent infection of the left eye now and then address uveitis treatment later  Eye pressure is 6 in the left eye . There is a subtle leak of the trabeculectomy bleb with is more like a dribble rather. Discussed with my colleague and we will start an antibiotic drop and contact lens without tap/inject as does not appear to be blebitis/bleb associated endophthalmitis. We will watch carefully and discussed possibility of other interventions tomorrow if things worsen  MRI did not notice any signs of optic neuritis, so vision change is perhaps related to uveitis.Give new changes on MRI, if you and Dr. Sheffield consider treatment for Multiple Sclerosis, perhaps consideration might be given for Rituximab or Ocrelizumab (Ocrevus) if apppropriate as these can also help uveitis.  Recommend additional diagnostic testing: None specifically  We placed Kontour Contact lens left eye, BC 8.90, Diameter 16.0  Start antibiotic eye drop (Moxifloxacin) every 2 hours while awake left eye   NO steroid drops, pills, nor eye injections needed    RTC   DR. Sharma tomorrow to discuss bleb leak and discuss with Yamini Jaramillo. Dr. Sheffield next week    Physician Attestation    Attending Physician Attestation:  Complete documentation of historical and exam elements from today's encounter can be found in the full encounter summary report (not reduplicated in this progress note). I personally obtained the chief complaint(s) and history of present illness. I confirmed and edited as necessary the review of systems, past medical/surgical history, family history, social history, and examination findings as documented by others; and I examined the patient myself. I personally reviewed the relevant tests, images, and  reports as documented above. I formulated and edited as necessary the assessment and plan and discussed the findings and management plan with the patient and any family members present at the time of the visit.  Sameer Kc M.D., Uveitis and Medical Retina, December 4, 2023     Again, thank you for allowing me to participate in the care of your patient.      Sincerely,    Sameer Kc MD  [facility] Dept of Ophthalmology  Uveitis and Medical Retina

## 2023-12-04 NOTE — PROGRESS NOTES
Chief Complaint/Presenting Concern: Uveitis evaluation    History of Present Illness:   Alejandra Forbes is a 43 year old patient who presents for evaluation of uveitis from Dr. More.     The uveitis has been present for decades and treatment has included steroid drops, steroid injections and perhaps methotrexate previously. There has not been any use of biologic treatments.She was seen by Dr. More on 10/31/23 who noted diffuse peripheral and central vascular leakage in FA but fovea was dry. Given worsened viison in the left eye that was not explained by CME, patient was referred for this evaluation    April reports some discomfort around the left eye (and some sharp pain for last few weeks) and some flashes from the left eye when laying down at night. No such changes noted in the right eye. She notes that vision seems fuzzier and perhaps decreased in the left eye.    Additional Ocular History:   Previously cystoid macular edema of each eye. Previously treated with intraocular and periocular steroid injections  2.  Glaucoma surgery left eye (Trab each eye 2006 Dr. Edward). History of steroid responder  3. Cataract surgery with lens implant left eye s/p yag capsulotomy  4. ERM left eye> right eye     Relevant Past Medical/Family/Social History:  Multiple Sclerosis followed by Dr. Sheffield. Recent MRI showed some new lesions but none near optic nerves. She has an upcoming appointment on 12/12/23 and plans to discuss treatment of MS at that time. She was on interferon previously from [id number]. She stopped it due to side effects of constant flu like symptoms.   Type II DM. A1c 6.9 11/7/23. On Trulicity and metformin 1000mg 2x/day.   HTN: On lisinopril 2.5mg daily.   HLD: On atorvastatin 20mg daily.     In Doctoral program for Education. Grandmother with Glaucoma    Relevant Review of Systems: Recent cold symptoms. Some balance issues recently and some sharp headache pains. Longstanding fatigue    Laboratory  Testing  Routine labs per PCP on 11/7/23:  BMP with elevated glucose but otherwise within normal limits.   Lipids with elevated cholesterol and LDL.      MRI Brain 11/14/23: Impression:     1. Markedly increased size and number of T2 hyperintense lesions throughout the cerebral white matter as described, highly suspicious for a demyelinating etiology.     2. Focus of avid enhancement within the left frontal lobe periventricular white matter, most likely representing an active area of demyelination.     Current eye related medications: None    Retina/Uveitis Imaging:  OCT Spectralis Macula December 4, 2023  right eye: Stable ERM slightly obscuring foveal contour but grossly normal foveal contour. No fluid. No thinning of retinal layers. Normal choroidal thickness.   left eye: ERM. New small pocket of fluid in inner retina nasal to fovea. Improved fluid temporally. New choroidal folds.    Optos FA October 31, 2023  right eye: Vessel tortuosity. Hypoflurescence supratemporal to disc. Diffuse small vessel leakage.  left eye: Vessel tortuosity. Diffuse hyperfluorescent leakage but especially perifoveal and infranasal to disc. Slower filling of inferior venous branches.     Assessment:    1. Intermediate uveitis of both eyes  Right eye no AC Cell, no haze    Left with AC Cell, fine haze    2. Cystoid macular edema of both eyes  Minimal right eye, slightly worse left eye     3. Leaking of conjunctival drainage bleb - Left Eye  Subtle dribble from trabeculectomy bleb    4. Hypotony, left eye  IOP 6 with choroidal folds    5. Choroidal detachment of left eye  Inferiorly from 4-7    6. Multiple sclerosis (H)  Followed by Dr. Sheffield    Plan/Recommendations:  Discussed findings with patient. There is some clinical, OCT, and angiographic features of intermediate uveitis. There is some hypotony maculopathy and inferior choroidal detachment. We will treat to prevent infection of the left eye now and then address uveitis  treatment later  Eye pressure is 6 in the left eye . There is a subtle leak of the trabeculectomy bleb with is more like a dribble rather. Discussed with my colleague and we will start an antibiotic drop and contact lens without tap/inject as does not appear to be blebitis/bleb associated endophthalmitis. We will watch carefully and discussed possibility of other interventions tomorrow if things worsen  MRI did not notice any signs of optic neuritis, so vision change is perhaps related to uveitis.Give new changes on MRI, if you and Dr. Sheffield consider treatment for Multiple Sclerosis, perhaps consideration might be given for Rituximab or Ocrelizumab (Ocrevus) if apppropriate as these can also help uveitis.  Recommend additional diagnostic testing: None specifically  We placed Kontour Contact lens left eye, BC 8.90, Diameter 16.0  Start antibiotic eye drop (Moxifloxacin) every 2 hours while awake left eye   NO steroid drops, pills, nor eye injections needed    RTC   DR. Sharma tomorrow to discuss bleb leak and discuss with Yamini  2. Dr. Sheffield next week    Physician Attestation     Attending Physician Attestation:  Complete documentation of historical and exam elements from today's encounter can be found in the full encounter summary report (not reduplicated in this progress note). I personally obtained the chief complaint(s) and history of present illness. I confirmed and edited as necessary the review of systems, past medical/surgical history, family history, social history, and examination findings as documented by others; and I examined the patient myself. I personally reviewed the relevant tests, images, and reports as documented above. I formulated and edited as necessary the assessment and plan and discussed the findings and management plan with the patient and any family members present at the time of the visit.  Sameer Kc M.D., Uveitis and Medical Retina, December 4, 2023

## 2023-12-04 NOTE — NURSING NOTE
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  MRI done last month and is seeing an MS specialist next week   this am  Lab Results       Component                Value               Date                       A1C                      6.9                 11/07/2023                 A1C                      7.1                 03/14/2023                 A1C                      6.6                 06/20/2022                 A1C                      8.0                 02/15/2022                 A1C                      6.2                 07/18/2019                 A1C                      6.1                 10/01/2018                 A1C                      6.5                 05/04/2018                 A1C                      7.1                 01/15/2018                 A1C                      7.3                 06/08/2015             Zabrina Melchor COT 1:42 PM December 4, 2023

## 2023-12-04 NOTE — PATIENT INSTRUCTIONS
We placed Kontour Contact lens left eye   Start antibiotic eye drop (Moxifloxacin) every 2 hours while awake left eye   NO steroid drops, pills, nor eye injections needed

## 2023-12-05 ENCOUNTER — OFFICE VISIT (OUTPATIENT)
Dept: OPHTHALMOLOGY | Facility: CLINIC | Age: 43
End: 2023-12-05
Attending: OPHTHALMOLOGY
Payer: COMMERCIAL

## 2023-12-05 ENCOUNTER — TELEPHONE (OUTPATIENT)
Dept: OPHTHALMOLOGY | Facility: CLINIC | Age: 43
End: 2023-12-05

## 2023-12-05 DIAGNOSIS — H59.89 LEAKING OF CONJUNCTIVAL DRAINAGE BLEB: Primary | ICD-10-CM

## 2023-12-05 DIAGNOSIS — H44.40 HYPOTONY, LEFT EYE: ICD-10-CM

## 2023-12-05 DIAGNOSIS — T81.31XA LEAKING OF CONJUNCTIVAL DRAINAGE BLEB: Primary | ICD-10-CM

## 2023-12-05 PROCEDURE — 99214 OFFICE O/P EST MOD 30 MIN: CPT | Performed by: OPHTHALMOLOGY

## 2023-12-05 PROCEDURE — 92133 CPTRZD OPH DX IMG PST SGM ON: CPT | Performed by: OPHTHALMOLOGY

## 2023-12-05 PROCEDURE — 99213 OFFICE O/P EST LOW 20 MIN: CPT | Performed by: OPHTHALMOLOGY

## 2023-12-05 ASSESSMENT — SLIT LAMP EXAM - LIDS: COMMENTS: NORMAL

## 2023-12-05 ASSESSMENT — REFRACTION_WEARINGRX
OS_SPHERE: -3.75
OS_ADD: +1.75
OD_CYLINDER: +0.25
OS_SPHERE: -3.25
OD_AXIS: 055
OS_CYLINDER: +5.50
OS_ADD: +1.75
SPECS_TYPE: PAL
OD_ADD: +1.75
OS_AXIS: 098
OS_AXIS: 095
OD_SPHERE: -4.50
OS_CYLINDER: +5.50
OD_ADD: +1.75
OD_CYLINDER: +0.25
OD_AXIS: 050
OD_SPHERE: -4.25

## 2023-12-05 ASSESSMENT — CONF VISUAL FIELD
OD_SUPERIOR_NASAL_RESTRICTION: 0
OS_SUPERIOR_NASAL_RESTRICTION: 0
OD_NORMAL: 1
OD_INFERIOR_TEMPORAL_RESTRICTION: 0
OD_INFERIOR_NASAL_RESTRICTION: 0
OS_INFERIOR_NASAL_RESTRICTION: 0
METHOD: COUNTING FINGERS
OS_NORMAL: 1
OD_SUPERIOR_TEMPORAL_RESTRICTION: 0
OS_SUPERIOR_TEMPORAL_RESTRICTION: 0
OS_INFERIOR_TEMPORAL_RESTRICTION: 0

## 2023-12-05 ASSESSMENT — EXTERNAL EXAM - RIGHT EYE: OD_EXAM: NORMAL

## 2023-12-05 ASSESSMENT — TONOMETRY
OS_IOP_MMHG: 13
OS_IOP_MMHG: 5
IOP_METHOD: APPLANATION
IOP_METHOD: ICARE
OD_IOP_MMHG: 15

## 2023-12-05 ASSESSMENT — VISUAL ACUITY
OD_CC: 20/30
CORRECTION_TYPE: GLASSES
OS_PH_CC: 20/80
METHOD: SNELLEN - LINEAR
OD_CC+: -3
OS_CC: 20/125

## 2023-12-05 ASSESSMENT — EXTERNAL EXAM - LEFT EYE: OS_EXAM: NORMAL

## 2023-12-05 ASSESSMENT — CUP TO DISC RATIO
OS_RATIO: 0.3
OD_RATIO: 0.4

## 2023-12-05 NOTE — PROGRESS NOTES
Chief Complaint/Presenting Concern:  Bleb Leak evaluation.     History of Present Illness:   Alejandra Forbes is a 43 year old patient who presents for evaluation of glaucoma. She has previously followed with Dr. Edward. S/p trab each eye in 2006. She was seen yesterday in the uveitis clinic and exam was notable for IOP of 6 in the left eye and a subtle leak of the trab bleb in the left eye. OCT macula was significant for new choroidal folds in the left eye. Moxifloxacin was started q2h in the left eye and contact lens was placed.     Today, December 5, 2023 reports she has had left eye pain for the last week. She had pain in the side and top of her left eye over the last week. She was seen by Dr. Kc who noted bleb leak yesterday and he placed a bandage contact lens.    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 Edward)  Maximum intraocular pressure > 40 both eyes  Currently Meds: none  Family history: positive - grandmother  Trauma history: negative  Steroid exposure: negative  Meds AEs/intolerance: none  PMHx: has very mild asthma  Anticoagulants: none     Today's testing:  IOP 5 left eye by applanation  OCT Optic Nerve RNFL Spectralis December 5, 2023  right eye: normal thickness, no change since 2015  left eye: increased thickness/edema compared to 2015    Additional Ocular History:   2. 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  Previously treated with intraocular and periocular steroid injections    3. Cataract surgery with lens implant left eye s/p yag capsulotomy    4. ERM left eye> right eye     5. Bleb leak left eye   Noted yesterday by Dr. Kc, started on topical Vigamox antibiotics     Plan/Recommendations:  Discussed  findings with patient. 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 Edward. Has not been on IOP lowering drops. OCT appears stable since 2015 with slightly worsening disc edema in the left eye. Saw Dr. Kc yesterday who was concerned for uveitis flare in the left eye as well as a bleb leak. BCL placed yesterday and removed today. Bleb in the left eye appears to be sweating. Does not appear to be infected at this time.  Discussed option of replacing BCL today to promote scarring. Other option is surgical advancement of conjunctiva to cover this thin area of conjunctiva. Will reassess in a week and decide on surgery if no improvement. Discussed with Dr. Kc that will need to verify there is no active inflammation and to assess the need for steroids. Topical steroids may hinder scarring and may worsen the leak so will hold off topical steroid for now.   Continue Vigamox 6 times daily in the left eye  Kontour lens replaced today and will follow up in 1 week  Informed the patient to return urgently if develops any worsening pain, redness, discharge, or drop in vision.     RTC in 1 week for VA, IOP    Physician Attestation     Carlee Rodriguez MD  PGY3 Ophthalmology Resident  [facility]    30 minutes spent on the date of the encounter doing chart review, interpretation of tests, documentation, and discussion with the patient         Attending Physician Attestation:  Complete documentation of historical and exam elements from today's encounter can be found in the full encounter summary report (not reduplicated in this progress note). I personally obtained the chief complaint(s) and history of present illness. I confirmed and edited as necessary the review of systems, past medical/surgical history, family history, social history, and examination findings as documented by others; and I examined the patient myself. I personally reviewed the  relevant tests, images, and reports as documented above. I personally reviewed the ophthalmic test(s) associated with this encounter, agree with the interpretation(s) as documented by the resident/fellow and have edited the corresponding report(s) as necessary. I formulated and edited as necessary the assessment and plan and discussed the findings and management plan with the patient and any family members present at the time of the visit.  Deonte Sharma M.D., Glaucoma, December 5, 2023

## 2023-12-05 NOTE — TELEPHONE ENCOUNTER
LELA on .     Per Dr. Kc, he wants to see her on same day as Dr. Sharma. Could schedule Yamini at 3:30pm. Let patient know. Gave my direct callback number.     Spoke with Dr. Kc, he could piggyback with Dr. Sharma's appointment. I have not let patient know yet, but will call her back tomorrow.     Maribell Whitman, COT 4:57 PM 12/05/2023

## 2023-12-05 NOTE — NURSING NOTE
Chief Complaints and History of Present Illnesses   Patient presents with    New Patient     Chief Complaint(s) and History of Present Illness(es)       New Patient              Laterality: left eye    Quality: blurred vision    Associated symptoms: tearing and photophobia.  Negative for floaters    Treatments tried: eye drops    Pain scale: 0/10              Comments    Glaucoma evaluation.for bleb leak left eye.   The patient is noting less left eye flashes today.  She has no eye pain.    She is wearing a Kontour left contact lens.  Ongoing light sensitivity.    The patient is using Moxifloxacin every two hours WA.  . Chhaya Mcleod, COA, COA 9:03 AM 12/05/2023

## 2023-12-07 ENCOUNTER — MYC MEDICAL ADVICE (OUTPATIENT)
Dept: FAMILY MEDICINE | Facility: CLINIC | Age: 43
End: 2023-12-07
Payer: COMMERCIAL

## 2023-12-07 DIAGNOSIS — E11.65 TYPE 2 DIABETES MELLITUS WITH HYPERGLYCEMIA, WITHOUT LONG-TERM CURRENT USE OF INSULIN (H): Primary | ICD-10-CM

## 2023-12-07 RX ORDER — TRIAMCINOLONE ACETONIDE 40 MG/ML
40 INJECTION, SUSPENSION INTRA-ARTICULAR; INTRAMUSCULAR
Status: ACTIVE | OUTPATIENT
Start: 2023-12-08 | End: 2025-01-02

## 2023-12-11 DIAGNOSIS — E11.65 TYPE 2 DIABETES MELLITUS WITH HYPERGLYCEMIA, WITHOUT LONG-TERM CURRENT USE OF INSULIN (H): ICD-10-CM

## 2023-12-11 RX ORDER — LISINOPRIL 2.5 MG/1
2.5 TABLET ORAL DAILY
Qty: 90 TABLET | Refills: 0 | Status: SHIPPED | OUTPATIENT
Start: 2023-12-11 | End: 2024-01-18

## 2023-12-12 ENCOUNTER — OFFICE VISIT (OUTPATIENT)
Dept: OPHTHALMOLOGY | Facility: CLINIC | Age: 43
End: 2023-12-12
Attending: FAMILY MEDICINE
Payer: COMMERCIAL

## 2023-12-12 ENCOUNTER — TELEPHONE (OUTPATIENT)
Dept: NEUROLOGY | Facility: CLINIC | Age: 43
End: 2023-12-12

## 2023-12-12 ENCOUNTER — DOCUMENTATION ONLY (OUTPATIENT)
Dept: OPHTHALMOLOGY | Facility: CLINIC | Age: 43
End: 2023-12-12

## 2023-12-12 ENCOUNTER — PRE VISIT (OUTPATIENT)
Dept: NEUROLOGY | Facility: CLINIC | Age: 43
End: 2023-12-12
Payer: COMMERCIAL

## 2023-12-12 ENCOUNTER — OFFICE VISIT (OUTPATIENT)
Dept: NEUROLOGY | Facility: CLINIC | Age: 43
End: 2023-12-12
Attending: FAMILY MEDICINE
Payer: COMMERCIAL

## 2023-12-12 VITALS
WEIGHT: 235.7 LBS | HEART RATE: 87 BPM | DIASTOLIC BLOOD PRESSURE: 104 MMHG | HEIGHT: 62 IN | OXYGEN SATURATION: 97 % | SYSTOLIC BLOOD PRESSURE: 142 MMHG | BODY MASS INDEX: 43.37 KG/M2

## 2023-12-12 DIAGNOSIS — H30.23 INTERMEDIATE UVEITIS OF BOTH EYES: Primary | ICD-10-CM

## 2023-12-12 DIAGNOSIS — H44.40 HYPOTONY, LEFT EYE: ICD-10-CM

## 2023-12-12 DIAGNOSIS — H59.89 LEAKING OF CONJUNCTIVAL DRAINAGE BLEB: Primary | ICD-10-CM

## 2023-12-12 DIAGNOSIS — G35 MULTIPLE SCLEROSIS (H): ICD-10-CM

## 2023-12-12 DIAGNOSIS — T81.31XA LEAKING OF CONJUNCTIVAL DRAINAGE BLEB: Primary | ICD-10-CM

## 2023-12-12 DIAGNOSIS — G35 MULTIPLE SCLEROSIS (H): Primary | ICD-10-CM

## 2023-12-12 PROCEDURE — 99214 OFFICE O/P EST MOD 30 MIN: CPT | Mod: 27 | Performed by: PSYCHIATRY & NEUROLOGY

## 2023-12-12 PROCEDURE — 99214 OFFICE O/P EST MOD 30 MIN: CPT | Mod: GC | Performed by: OPHTHALMOLOGY

## 2023-12-12 PROCEDURE — 99213 OFFICE O/P EST LOW 20 MIN: CPT | Performed by: OPHTHALMOLOGY

## 2023-12-12 PROCEDURE — 99205 OFFICE O/P NEW HI 60 MIN: CPT | Mod: GC | Performed by: PSYCHIATRY & NEUROLOGY

## 2023-12-12 RX ORDER — DORZOLAMIDE HYDROCHLORIDE AND TIMOLOL MALEATE 20; 5 MG/ML; MG/ML
1 SOLUTION/ DROPS OPHTHALMIC 2 TIMES DAILY
Qty: 10 ML | Refills: 2 | Status: SHIPPED | OUTPATIENT
Start: 2023-12-12 | End: 2024-01-05

## 2023-12-12 RX ORDER — ERYTHROMYCIN 5 MG/G
0.5 OINTMENT OPHTHALMIC AT BEDTIME
Qty: 3.5 G | Refills: 2 | Status: SHIPPED | OUTPATIENT
Start: 2023-12-12

## 2023-12-12 RX ORDER — PREDNISONE 10 MG/1
TABLET ORAL
Qty: 60 TABLET | Refills: 0 | Status: SHIPPED | OUTPATIENT
Start: 2023-12-12 | End: 2024-01-05

## 2023-12-12 ASSESSMENT — CUP TO DISC RATIO
OS_RATIO: 0.3
OD_RATIO: 0.4

## 2023-12-12 ASSESSMENT — EXTERNAL EXAM - LEFT EYE: OS_EXAM: NORMAL

## 2023-12-12 ASSESSMENT — TONOMETRY
OS_IOP_MMHG: 10
IOP_METHOD: APPLANATION
OD_IOP_MMHG: 20
OD_IOP_MMHG: 14
OS_IOP_MMHG: 6

## 2023-12-12 ASSESSMENT — SLIT LAMP EXAM - LIDS: COMMENTS: NORMAL

## 2023-12-12 ASSESSMENT — EXTERNAL EXAM - RIGHT EYE: OD_EXAM: NORMAL

## 2023-12-12 ASSESSMENT — VISUAL ACUITY
OD_CC: 20/40
OD_CC+: +2
OS_CC: 20/125
METHOD: SNELLEN - LINEAR

## 2023-12-12 ASSESSMENT — PAIN SCALES - GENERAL: PAINLEVEL: NO PAIN (0)

## 2023-12-12 NOTE — PATIENT INSTRUCTIONS
Continue Viagmox 3 times per day in the left eye    Start Cosopt 2 times a day in the left eye    Start erythromycin ointment in the left eye at bedtime (or twice a day if able)

## 2023-12-12 NOTE — NURSING NOTE
Chief Complaint   Patient presents with    MS    New Patient     Establishing MS  care      Vitals were taken and medications were reconciled.   Guanakito Warren, EMT  10:54 AM

## 2023-12-12 NOTE — LETTER
2023       RE: Alejandra Forbes  [address]  [place] [address]       Dear Colleague,    Thank you for referring your patient, Alejandra Forbes, to the [facility] MULTIPLE S[facility]OS[facility] CL[facility] [place] at [facility]. Please see a copy of my visit note below.    [facility]    Patient Name:  Alejandra Forbes  MRN:  [id number]    :  1980  Date of Service:  2023  Primary care provider:  Shivani Clemons        Chief Complaint:  MS    History of Present Illness:   Alejandra Forbes is a 43 year old female with history of MS  who presents for the follow up.     Patient has history of pars planitis. During her surveillance  imaging, she was found to have enhancing lesion in  and LP results consistent with OCB 14. In , she was started on Glatiramer. She was switched Rebif in  to better cover for her  Pars Planitis in . She had loss to follow up from  to . Patient had the stopped her Rebif (For flu-like side effects vs work-related stress). She was seen by Guanaco cloud back on 6/15/2018. She was started back on her Rebif. (Other options like Aubagio and dimethyl fumarate) were also discussed.     Patient recently had ongoing inflammation of her left eye due to her uveitis, she was told to have leakage of vessels of her eye. She requested repeat MRI to her PCP.  The MRI Brain 23 revealed increased size and number of T2 hyperintensities throughout cerebral hemisphere as well as new contrast enhancing lesions within L frontal lobe periventricular white matter.     Today, patient complains of issues with her concentration, word finding difficulty, electrical impulses moving down from her spine to brain, balance issues, and tingling.  These issues have been going on for years and intermittent.  She has difficulty starting movements in the morning and never had a  fall.    Patient admitted that she stopped taking Rebif due to flulike symptoms back in 2019 and has not been on any medication since.     ROS  A comprehensive ROS was performed and pertinent findings were included in HPI.     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    Steroid responders     Uveitis      Past Surgical History:   Procedure Laterality Date    CATARACT IOL, RT/LT Left 2006    GLAUCOMA SURGERY Bilateral 2006    YAG CAPSULOTOMY OS (LEFT EYE) Left 09/13    Z NONSPECIFIC PROCEDURE  4/06, 7/06    glaucoma surgery of both eyes due to steroid txt of pars planaris       Medications   I have personally reviewed the patient's medication list.     Allergies  I have personally reviewed the patient's allergy list.       Physical Examination   Vitals: There were no vitals taken for this visit.  General: Lying in bed, NAD  Head: NC/AT  Eyes: no icterus, op pink and moist  Cardiac: RRR. Extremities warm, no edema.   Respiratory: non-labored on RA  GI: S/NT/ND  Skin: No rash or lesion on exposed skin  Psych: Mood pleasant, affect  congruent  Neuro:  Mental status: Awake, alert, attentive, oriented to self, time, place, and circumstance. Language is fluent and coherent with intact comprehension of complex commands, naming and repetition.  Cranial nerves: VFF, PERRL, conjugate gaze, EOMI, facial sensation intact, face symmetric, shoulder shrug strong, tongue/uvula midline, no dysarthria.   Motor: Normal bulk and tone. No abnormal movements. 5/5 strength bilaterally in deltoids, biceps, triceps, hand , hip flexors, hip extensors, knee flexion, knee extension, plantarflexion, dorsiflexion.   Reflexes: Normo-reflexic and symmetric biceps, brachioradialis, triceps, patellae, and achilles. Negative Calderon, no clonus, toes down-going.  Sensory: Intact to light touch, pin, vibration, and proprioception in proximal and distal aspects of all 4 extremities   Coordination: FNF and HS without ataxia or dysmetria. Rapid alternating movements intact.   Gait: Normal width, stride length, turn, and arm swing. Station normal. Heel, toe, and tandem walk intact.    Investigations   I have personally reviewed pertinent labs, tests, and radiological imaging. Discussion of notable findings is included under Impression.       MrI Brain w/wo contrast 11/14/23  Impression:      1. Markedly increased size and number of T2 hyperintense lesions  throughout the cerebral white matter as described, highly suspicious  for a demyelinating etiology.      CSF 2013  OGB 14    Impression    # Multiple sclerosis (Relapsing-remitting)  Alejandra Forbes is a 43 year old female with history of MS, who presents for the follow up.  Patient is currently not on any medications. Her recent MRIs revealed increased numbers of patients and new enhancing lesion as well.  Her neurologic examination is unremarkable.  But does have intermittent c/o ssues with her concentration, word finding difficulty, electrical impulses moving down from her spine to brain, balance issues, and tingling. She  has radiologically isolated syndrome with accumulating lesions on the brain. Even though she does not qualify for criteria, we are convinced that she has relapsing-remitting MS. We discussed starting some sort of treatment back to prevent future relapses and long term cumulative disability. We discussed several options based on efficacy and safety profile. She has not tolerated Rebif well. She tolerated Copaxone well in the past when started back in 2014 and is willing to restart it.       Recommendations  -glatiramer acetate 40mg 3 times per week.       Patient was seen and discussed with Dr. cloud.      Gretel Coughlin MD  Neurology PGY2     I saw and examined this patient, and have read and edited the resident's note.  I agree with the findings, assessment, and plan.  I spent 62 minutes on her care on the date of service including chart and MRI review and face to face time.  April has continued to have new and active MRI lesions typical of MS.  We discussed MS immunotherapy - despite her lack of clinical relapses, I told her I think it is in her best interests, in terms of maintaining neurologic health, to go back on MS treatment.  We discussed the options, decided to restart GA.  Plan as above.        Again, thank you for allowing me to participate in the care of your patient.      Sincerely,    Fredo Cloud MD

## 2023-12-12 NOTE — PROGRESS NOTES
Asked by Dr. Sharma to see Alejandra Forbes today.  On exam eye pressure was noted to be 6 in the left eye with a very small pinpoint bleed of the superior conjunctival bleb without purulent material.My brief exam did show some improving anterior chamber inflammation (trace cell and only trace flare)as well as potentially improvement in the optic disc margins.    I discussed with Dr. Sharma as well as the patient in the context of improved inflammation.  We discussed the possibility of a course of oral prednisone or potentially an inferotemporal subtenons Kenalog injection.  We discussed risk and benefits of each including possible infection risk with subtenons Kenalog injection and potential systemic side effects of oral prednisone.  We agreed that given subtle interval improvement in the eye inflammation that the antibiotic prophylaxis was reasonable and we we will try course of oral prednisone at a moderate dose of 30 mg daily.  Explained to the patient that this was potentially safer for infection standpoint then doing a Kenalog injection on the left eye. April will let me know if there are any issues in the interim.    Dr. Sharma will do a check again in 1 week and likely also with me.    Sameer Kc M.D.  Uveitis and Medical Retina  [facility] Department of Ophthalmology and Visual Neurosciences

## 2023-12-12 NOTE — NURSING NOTE
Chief Complaints and History of Present Illnesses   Patient presents with    Glaucoma Follow Up     Chief Complaint(s) and History of Present Illness(es)       Glaucoma Follow Up              Laterality: left eye              Comments    Since last visit has noted left eye feels vision may be worse but feels possible due to Kontur lens.  Notes a little bit of discomfort like the lens is rubbing but no discrete pain.  Is using moxifloxacin 6 times a day.  Consistently gets the drops in the eye.      Dian Mclaughlin on 12/12/2023 at 8:20 AM

## 2023-12-12 NOTE — PROGRESS NOTES
Chief Complaint/Presenting Concern: Bleb Leak evaluation.     History of Present Illness:   Alejandra Forbes is a 43 year old patient who presents for evaluation of glaucoma. She has previously followed with Dr. Edward. S/p trab each eye in 2006. She was seen yesterday in the uveitis clinic and exam was notable for IOP of 6 in the left eye and a subtle leak of the trab bleb in the left eye. OCT macula was significant for new choroidal folds in the left eye. Moxifloxacin was started q2h in the left eye and contact lens was placed.     Today December 12, 2023 she presents for one week follow up bleb leak/sweating left eye. Using Vigamox 6x/day. Vision seems worse in the left eye possible due to the CL. Some discomfort but not pain.    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 Edward)  Maximum intraocular pressure > 40 both eyes  Currently Meds: none  Family history: positive - grandmother  Trauma history: negative  Steroid exposure: negative  Meds AEs/intolerance: none  PMHx: has very mild asthma  Anticoagulants: none   Previous testing:  OCT Optic Nerve RNFL Spectralis December 5, 2023  right eye: normal thickness, no change since 2015  left eye: increased thickness/edema compared to 2015    Today's testing:  IOP 5 left eye by applanation  AC formed     Additional Ocular History:   2. 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  Previously treated with intraocular and periocular steroid injections    3. Cataract surgery with lens implant left eye s/p yag capsulotomy    4. ERM left eye> right eye     5. Bleb leak left eye   Noted yesterday by Dr. Kc, started on topical Vigamox antibiotics     Plan/Recommendations:  Discussed findings with  patient. 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 Edward. Has not been on IOP lowering drops. OCT appears stable since 2015 with slightly worsening disc edema in the left eye.   Today there is a pinpoint leak from the bleb. Will discuss with Dr. Kc timing of surgery given possibility of active inflammation. Will treat medically for now and consider surgery next week if leak is not improved.  Continue Vigamox 3 times daily in the left eye  Start Cosopt BID left eye to help decrease the aqueous production and promote heals of the bleb leak   Start erythromycin ointment at bedtime or twice a day if tolerated  Start on Oral steroids as per Dr. Kc   Continue eye shield at bedtime and no eye rubbing  Informed the patient to return urgently if develops any worsening pain, redness, discharge, or drop in vision.     RTC in 1 week for VA, IOP    Physician Attestation     Carlee Rodriguez MD  PGY3 Ophthalmology Resident  [facility]    Attending Physician Attestation:  Complete documentation of historical and exam elements from today's encounter can be found in the full encounter summary report (not reduplicated in this progress note). I personally obtained the chief complaint(s) and history of present illness. I confirmed and edited as necessary the review of systems, past medical/surgical history, family history, social history, and examination findings as documented by others; and I examined the patient myself. I personally reviewed the relevant tests, images, and reports as documented above. I formulated and edited as necessary the assessment and plan and discussed the findings and management plan with the patient and any family members present at the time of the visit.  Deonte Sharma M.D., Glaucoma, December 12, 2023

## 2023-12-12 NOTE — TELEPHONE ENCOUNTER
Prior Authorization Specialty Medication Request    Medication/Dose: Glatiramer 40 mg  Diagnosis and ICD code (if different than what is on RX):  Multiple sclerosis, G35  New/renewal/insurance change PA/secondary ins. PA: NEW  Previously Tried and Failed:  Rebif (stopped due to side effects in 2019). Has previously been on Copaxone [id number] (switched from Copaxone to Rebif in 2015 with the goal of better control of pars planitis; did not fail copaxone from MS standpoint)    Important Lab Values:   Rationale: Restart of disease-modifying therapy for demyelinating disease. Lesion accumulation and active lesion on most recent MRI.     Insurance   Primary: [employer]  Insurance ID:  [id number]    Secondary (if applicable):  Insurance ID:      Pharmacy Information (if different than what is on RX)  Name:    Phone:    Fax:

## 2023-12-12 NOTE — PROGRESS NOTES
[facility]    Patient Name:  Alejandra Forbes  MRN:  [id number]    :  1980  Date of Service:  2023  Primary care provider:  Shivani Clemons        Chief Complaint:  MS    History of Present Illness:   Alejandra Forbes is a 43 year old female with history of MS  who presents for the follow up.     Patient has history of pars planitis. During her surveillance  imaging, she was found to have enhancing lesion in  and LP results consistent with OCB 14. In , she was started on Glatiramer. She was switched Rebif in  to better cover for her  Pars Planitis in . She had loss to follow up from  to . Patient had the stopped her Rebif (For flu-like side effects vs work-related stress). She was seen by Guanaco cloud back on 6/15/2018. She was started back on her Rebif. (Other options like Aubagio and dimethyl fumarate) were also discussed.     Patient recently had ongoing inflammation of her left eye due to her uveitis, she was told to have leakage of vessels of her eye. She requested repeat MRI to her PCP.  The MRI Brain 23 revealed increased size and number of T2 hyperintensities throughout cerebral hemisphere as well as new contrast enhancing lesions within L frontal lobe periventricular white matter.     Today, patient complains of issues with her concentration, word finding difficulty, electrical impulses moving down from her spine to brain, balance issues, and tingling.  These issues have been going on for years and intermittent.  She has difficulty starting movements in the morning and never had a fall.    Patient admitted that she stopped taking Rebif due to flulike symptoms back in 2019 and has not been on any medication since.     ROS  A comprehensive ROS was performed and pertinent findings were included in HPI.     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     Steroid responders      Uveitis      Past Surgical History:   Procedure Laterality Date     CATARACT IOL, RT/LT Left 2006     GLAUCOMA SURGERY Bilateral 2006     YAG CAPSULOTOMY OS (LEFT EYE) Left 09/13     [facility] NONSPECIFIC PROCEDURE  4/06, 7/06    glaucoma surgery of both eyes due to steroid txt of pars planaris       Medications   I have personally reviewed the patient's medication list.     Allergies  I have personally reviewed the patient's allergy list.       Physical Examination   Vitals: There were no vitals taken for this visit.  General: Lying in bed, NAD  Head: NC/AT  Eyes: no icterus, op pink and moist  Cardiac: RRR. Extremities warm, no edema.   Respiratory: non-labored on RA  GI: S/NT/ND  Skin: No rash or lesion on exposed skin  Psych: Mood pleasant, affect congruent  Neuro:  Mental status: Awake, alert, attentive, oriented to self, time, place, and circumstance. Language is fluent and coherent with intact comprehension of complex commands, naming and repetition.  Cranial nerves: VFF, PERRL, conjugate gaze, EOMI, facial sensation intact, face symmetric, shoulder shrug strong, tongue/uvula midline,  no dysarthria.   Motor: Normal bulk and tone. No abnormal movements. 5/5 strength bilaterally in deltoids, biceps, triceps, hand , hip flexors, hip extensors, knee flexion, knee extension, plantarflexion, dorsiflexion.   Reflexes: Normo-reflexic and symmetric biceps, brachioradialis, triceps, patellae, and achilles. Negative Calderon, no clonus, toes down-going.  Sensory: Intact to light touch, pin, vibration, and proprioception in proximal and distal aspects of all 4 extremities   Coordination: FNF and HS without ataxia or dysmetria. Rapid alternating movements intact.   Gait: Normal width, stride length, turn, and arm swing. Station normal. Heel, toe, and tandem walk intact.    Investigations   I have personally reviewed pertinent labs, tests, and radiological imaging. Discussion of notable findings is included under Impression.       MrI Brain w/wo contrast 11/14/23  Impression:      1. Markedly increased size and number of T2 hyperintense lesions  throughout the cerebral white matter as described, highly suspicious  for a demyelinating etiology.      CSF 2013  OGB 14    Impression    # Multiple sclerosis (Relapsing-remitting)  Alejandra Forbes is a 43 year old female with history of MS, who presents for the follow up.  Patient is currently not on any medications. Her recent MRIs revealed increased numbers of patients and new enhancing lesion as well.  Her neurologic examination is unremarkable.  But does have intermittent c/o ssues with her concentration, word finding difficulty, electrical impulses moving down from her spine to brain, balance issues, and tingling. She has radiologically isolated syndrome with accumulating lesions on the brain. Even though she does not qualify for criteria, we are convinced that she has relapsing-remitting MS. We discussed starting some sort of treatment back to prevent future relapses and long term cumulative disability. We discussed several options based on efficacy and  safety profile. She has not tolerated Rebif well. She tolerated Copaxone well in the past when started back in 2014 and is willing to restart it.       Recommendations  -glatiramer acetate 40mg 3 times per week.       Patient was seen and discussed with Dr. cloud.      Gretel Coughlin MD  Neurology PGY2     I saw and examined this patient, and have read and edited the resident's note.  I agree with the findings, assessment, and plan.  I spent 62 minutes on her care on the date of service including chart and MRI review and face to face time.  April has continued to have new and active MRI lesions typical of MS.  We discussed MS immunotherapy - despite her lack of clinical relapses, I told her I think it is in her best interests, in terms of maintaining neurologic health, to go back on MS treatment.  We discussed the options, decided to restart GA.  Plan as above.    Fredo Cloud MD

## 2023-12-13 RX ORDER — GLATIRAMER 40 MG/ML
40 INJECTION, SOLUTION SUBCUTANEOUS
Qty: 12 ML | Refills: 11 | Status: SHIPPED | OUTPATIENT
Start: 2023-12-13

## 2023-12-13 NOTE — TELEPHONE ENCOUNTER
PA Initiation    Medication: GLATIRAMER ACETATE 40 MG/ML SC SOSY  Insurance Company: [employer] - Phone [phone] Fax [phone]  Pharmacy Filling the Rx: [facility]  Filling Pharmacy Phone:    Filling Pharmacy Fax:    Start Date: 12/13/2023        Thank you,    Vickie Lane [facility]  Specialty Pharmacy Clinic Liaison - CardiologyNeurologyMultiple Sclerosis  [facility] [address]  [address] [place] [address]  Ph: [phone] Fax: [phone]  [email or web address]

## 2023-12-13 NOTE — TELEPHONE ENCOUNTER
Glatiramer acetate 40 mg prescription entered on behalf of Dr Sheffield and jose alejandro message sent to pt.    Rebeka Bauman RN

## 2023-12-13 NOTE — TELEPHONE ENCOUNTER
Prior Authorization Approval    Medication: GLATIRAMER ACETATE 40 MG/ML SC SOSY  Authorization Effective Date: 12/13/2023  Authorization Expiration Date: 12/13/2024  Approved Dose/Quantity: 28 days  Reference #:     Insurance Company: [employer] - Phone [phone] Fax [phone]  Expected CoPay: $    CoPay Card Available:      Financial Assistance Needed: Yes  Which Pharmacy is filling the prescription: [facility]  Pharmacy Notified: Yes  Patient Notified: Yes        Thank you,    Vickie Lane [facility]  Specialty Pharmacy Clinic Liaison - CardiologyNeurologyMultiple Sclerosis  [facility] [address] [address]  Ph: [phone] Fax: [phone]  [email or web address]

## 2023-12-14 ENCOUNTER — VIRTUAL VISIT (OUTPATIENT)
Dept: PHARMACY | Facility: CLINIC | Age: 43
End: 2023-12-14
Payer: COMMERCIAL

## 2023-12-14 DIAGNOSIS — E11.65 TYPE 2 DIABETES MELLITUS WITH HYPERGLYCEMIA, WITHOUT LONG-TERM CURRENT USE OF INSULIN (H): ICD-10-CM

## 2023-12-14 DIAGNOSIS — G35 MULTIPLE SCLEROSIS (H): ICD-10-CM

## 2023-12-14 DIAGNOSIS — R73.9 STEROID-INDUCED HYPERGLYCEMIA: Primary | ICD-10-CM

## 2023-12-14 DIAGNOSIS — T38.0X5A STEROID-INDUCED HYPERGLYCEMIA: Primary | ICD-10-CM

## 2023-12-14 DIAGNOSIS — H20.9 UVEITIS: ICD-10-CM

## 2023-12-14 PROCEDURE — 99606 MTMS BY PHARM EST 15 MIN: CPT | Performed by: PHARMACIST

## 2023-12-14 RX ORDER — PEN NEEDLE, DIABETIC 32GX 5/32"
NEEDLE, DISPOSABLE MISCELLANEOUS
Qty: 100 EACH | Refills: 0 | Status: SHIPPED | OUTPATIENT
Start: 2023-12-14 | End: 2024-03-19

## 2023-12-14 NOTE — PROGRESS NOTES
Medication Therapy Management (MTM) Encounter    ASSESSMENT:                            Medication Adherence/Access: No issues identified    Diabetes   Will initiate NPH insulin at 0.15mg/kg given at the same time as prednisone. No changes to trulicity or metformin. Agree with April that a CGM would make titration/monitoring easier.     Uveitis/MS:   Working closely with providers, no concerns from medication standpoint.      PLAN:                            Start taking NPH (either Humulin or Novolin N) 15 units once daily at the same time as your prednisone dose. We may need to adjust this, but we start conservatively to avoid low blood sugars.  If you need a refresher on how to administer insulin, you can find a video here: [email or web address]  Freestyle Kay 2 sensors refilled    Follow-up: I will send you a [employer] message on Monday to see how your blood sugars are responding to the insulin. Please reach out if you have any questions!     SUBJECTIVE/OBJECTIVE:                          Alejandra Forbes is a 43 year old female called for a follow-up visit from 23.       Reason for visit: Has been prescribed steroids and her blood sugars have increased (see PresenceID messages for details).    Allergies/ADRs: Reviewed in chart  Past Medical History: Reviewed in chart  Tobacco: She reports that she has never smoked. She has never used smokeless tobacco.  Alcohol: 1-3 beverages / week    Medication Adherence/Access: no issues reported    Diabetes     Trulicity 1.5mg weekly  Metformin ER 500mg 2 tabs twice daily     Blood sugar monitorin-2 time(s) daily; Ranges (patient reported): 250-350mg/dL She has had no symptoms of hypoglycemia/hyperglycemia. She is currently checking with a fingerstick glucometer but has used a kay in the past and is interested in resuming use while figuring out the steroids.      Eye exam is up to date  Foot exam is up to date    Uveitis/MS:   Prednisone  30mg daily started yesterday - unknown duration, depends on how vision responds.   Will be restarting Copaxone as well.   Has had some issues with sleep (which is not unusual). She has been taking prednisone later in the day because of the Trulciity she doesn't feel like eating earlier in the day (she understands the impact on her sleep.   ----------------  I spent 12 minutes with this patient today. All changes were made via collaborative practice agreement with Shivani Clemons MD. A copy of the visit note was provided to the patient's provider(s).    A summary of these recommendations was sent via [employer].    Damien NobleD, GA, BCACP  MTM Pharmacist, [facility]     Telemedicine Visit Details  Type of service:  Telephone visit  Start Time:  1:15 PM  End Time:  1:27 PM     Medication Therapy Recommendations  Steroid-induced hyperglycemia    Current Medication: Continuous Blood Gluc Sensor (FREESTYLE GAMA 2 SENSOR) [facility] [place]   Rationale: Medication requires monitoring - Needs additional monitoring - Effectiveness   Recommendation: Self-Monitoring   Status: Accepted per CPA          Current Medication: dulaglutide (TRULICITY) 1.5 MG/0.5ML pen   Rationale: Synergistic therapy - Needs additional medication therapy - Indication   Recommendation: Start Medication - insulin  UNIT/ML vial   Status: Accepted per CPA

## 2023-12-14 NOTE — PATIENT INSTRUCTIONS
"Recommendations from today's MTM visit:                                                      Start taking NPH (either Humulin or Novolin N) 15 units once daily at the same time as your prednisone dose. We may need to adjust this, but we start conservatively to avoid low blood sugars.  If you need a refresher on how to administer insulin, you can find a video here: [email or web address]/[email or web address]-[email or web address]  Freestyle Kay 2 sensors refilled    Follow-up: I will send you a [employer] message on Monday to see how your blood sugars are responding to the insulin. Please reach out if you have any questions!     It was great speaking with you today.  I value your experience and would be very thankful for your time in providing feedback in our clinic survey. In the next few days, you may receive an email or text message from [employer] with a link to a survey related to your  clinical pharmacist.\"     To schedule another MTM appointment, please call the clinic directly or you may call the MTM scheduling line at [phone] or toll-free at [phone].     My Clinical Pharmacist's contact information:                                                      Please feel free to contact me with any questions or concerns you have.      Cherelle Almanzar, Pharm.D., M.B.A., [facility] Center[facility]  MT Pharmacist, [facility]  Pager: [phone]  Email: [email or web address]      "

## 2023-12-18 DIAGNOSIS — E11.65 TYPE 2 DIABETES MELLITUS WITH HYPERGLYCEMIA, WITHOUT LONG-TERM CURRENT USE OF INSULIN (H): ICD-10-CM

## 2023-12-18 RX ORDER — FLASH GLUCOSE SCANNING READER
EACH MISCELLANEOUS
Qty: 1 EACH | Refills: 0 | Status: SHIPPED | OUTPATIENT
Start: 2023-12-18 | End: 2024-03-19

## 2023-12-18 RX ORDER — FLASH GLUCOSE SCANNING READER
EACH MISCELLANEOUS
Qty: 1 EACH | Refills: 0 | Status: SHIPPED | OUTPATIENT
Start: 2023-12-18 | End: 2023-12-18

## 2023-12-19 ENCOUNTER — PREP FOR PROCEDURE (OUTPATIENT)
Dept: OPHTHALMOLOGY | Facility: CLINIC | Age: 43
End: 2023-12-19
Payer: COMMERCIAL

## 2023-12-19 ENCOUNTER — TELEPHONE (OUTPATIENT)
Dept: OPHTHALMOLOGY | Facility: CLINIC | Age: 43
End: 2023-12-19
Payer: COMMERCIAL

## 2023-12-19 ENCOUNTER — OFFICE VISIT (OUTPATIENT)
Dept: OPHTHALMOLOGY | Facility: CLINIC | Age: 43
End: 2023-12-19
Attending: OPHTHALMOLOGY
Payer: COMMERCIAL

## 2023-12-19 DIAGNOSIS — H59.89 LEAKING OF CONJUNCTIVAL DRAINAGE BLEB: Primary | ICD-10-CM

## 2023-12-19 DIAGNOSIS — T81.31XA LEAKING OF CONJUNCTIVAL DRAINAGE BLEB: Primary | ICD-10-CM

## 2023-12-19 PROCEDURE — 99214 OFFICE O/P EST MOD 30 MIN: CPT | Mod: GC | Performed by: OPHTHALMOLOGY

## 2023-12-19 PROCEDURE — 99214 OFFICE O/P EST MOD 30 MIN: CPT | Performed by: OPHTHALMOLOGY

## 2023-12-19 ASSESSMENT — CONF VISUAL FIELD
OD_INFERIOR_NASAL_RESTRICTION: 0
OS_INFERIOR_TEMPORAL_RESTRICTION: 0
OD_INFERIOR_TEMPORAL_RESTRICTION: 0
OD_SUPERIOR_NASAL_RESTRICTION: 0
METHOD: COUNTING FINGERS
OS_SUPERIOR_NASAL_RESTRICTION: 0
OS_SUPERIOR_TEMPORAL_RESTRICTION: 0
OS_INFERIOR_NASAL_RESTRICTION: 0
OS_NORMAL: 1
OD_NORMAL: 1
OD_SUPERIOR_TEMPORAL_RESTRICTION: 0

## 2023-12-19 ASSESSMENT — EXTERNAL EXAM - RIGHT EYE: OD_EXAM: NORMAL

## 2023-12-19 ASSESSMENT — SLIT LAMP EXAM - LIDS: COMMENTS: NORMAL

## 2023-12-19 ASSESSMENT — TONOMETRY
OS_IOP_MMHG: 10
OS_IOP_MMHG: 8
OD_IOP_MMHG: 18
IOP_METHOD: TONOPEN
OD_IOP_MMHG: 14
IOP_METHOD: APPLANATION

## 2023-12-19 ASSESSMENT — VISUAL ACUITY
CORRECTION_TYPE: GLASSES
OS_CC: 20/60
METHOD: SNELLEN - LINEAR IN TF
OD_CC: 20/50
OD_CC+: +2

## 2023-12-19 ASSESSMENT — EXTERNAL EXAM - LEFT EYE: OS_EXAM: NORMAL

## 2023-12-19 ASSESSMENT — CUP TO DISC RATIO
OS_RATIO: 0.3
OD_RATIO: 0.4

## 2023-12-19 NOTE — PROGRESS NOTES
Chief Complaint/Presenting Concern: Bleb Leak follow up     History of Present Illness:   Alejandra Forbes is a 43 year old patient who presents for evaluation of glaucoma. She has previously followed with Dr. Edward. S/p trab each eye in 2006. She was seen yesterday in the uveitis clinic and exam was notable for IOP of 6 in the left eye and a subtle leak of the trab bleb in the left eye. OCT macula was significant for new choroidal folds in the left eye. Moxifloxacin was started q2h in the left eye and contact lens was placed.     Today December 19 patient presents for 5 day follow up left eye bleb leak. Started on oral Prednisone 30 mg daily per Dr. Kc. Also started erythromycin ointment at bedtime but she was not started on Cosopt. No eye pain. Vision seems to be improving.    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 Edward)  Maximum intraocular pressure > 40 both eyes  Currently Meds: none  Family history: positive - grandmother  Trauma history: negative  Steroid exposure: negative  Meds AEs/intolerance: none  PMHx: has very mild asthma  Anticoagulants: none   Previous testing:  OCT Optic Nerve RNFL Spectralis December 5, 2023  right eye: normal thickness, no change since 2015  left eye: increased thickness/edema compared to 2015    Today's testing:  IOP 8 left eye by applanation  AC formed   Pinpoint leak persistent, leak improved after applying timolol and dorzolamide to the eye and waiting 2 hours     Additional Ocular History:   2. 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  Previously treated with intraocular and periocular steroid injections    3. Cataract surgery with lens implant left eye s/p yag capsulotomy    4. 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 Bleb leak left eye   Noted yesterday by Dr. Kc, started on topical Vigamox antibiotics     Plan/Recommendations:  Discussed findings with patient. 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 Edward. Has not been on IOP lowering drops. OCT appears stable since 2015 with slightly worsening disc edema in the left eye.   Today there is a stable pinpoint leak left eye. She was started on oral Prednisone per Dr. Kc to control inflammation for surgical preparation.  Continue Vigamox 3 times daily in the left eye  Start Cosopt BID left eye to help decrease the aqueous production and promote heals of the bleb leak   Continue erythromycin ointment at bedtime or twice a day if tolerated  Continue Oral steroids as per Dr. Kc   Continue eye shield at bedtime and no eye rubbing  Informed the patient to return urgently if develops any worsening pain, redness, discharge, or drop in vision.     RTC in 1 week for [facility], IOP  Schedule Bleb revision on 27th if leak persistent on next visit     Physician Attestation     Carlee Rodriguez MD  PGY3 Ophthalmology Resident  [facility]    Attending Physician Attestation:  Complete documentation of historical and exam elements from today's encounter can be found in the full encounter summary report (not reduplicated in this progress note). I personally obtained the chief complaint(s) and history of present illness. I confirmed and edited as necessary the review of systems, past medical/surgical history, family history, social history, and examination findings as documented by others; and I examined the patient myself. I personally reviewed the relevant tests, images, and reports as documented above. I formulated and edited as necessary the assessment and plan and discussed the findings and management plan with the patient and any family members present at the time of the visit.  Deonte  KERRY Sharma., Glaucoma, December 19, 2023

## 2023-12-19 NOTE — TELEPHONE ENCOUNTER
Patient returned call but hung up before I could answer no message was left, when writer called back it went straight to  again.    Anna C. Schoenecker on 12/19/2023 at 2:29 PM

## 2023-12-20 NOTE — TELEPHONE ENCOUNTER
Left voicemail for patient regarding scheduling surgery with Dr. Sharma.  Provided contact number to discuss.  [phone]    Fauzia Rodriguez, on 12/20/2023 at 8:05 AM

## 2023-12-21 ENCOUNTER — TELEPHONE (OUTPATIENT)
Dept: OPHTHALMOLOGY | Facility: CLINIC | Age: 43
End: 2023-12-21
Payer: COMMERCIAL

## 2023-12-21 PROBLEM — T81.31XA LEAKING OF CONJUNCTIVAL DRAINAGE BLEB: Status: ACTIVE | Noted: 2023-12-19

## 2023-12-21 PROBLEM — H59.89 LEAKING OF CONJUNCTIVAL DRAINAGE BLEB: Status: ACTIVE | Noted: 2023-12-19

## 2023-12-21 NOTE — TELEPHONE ENCOUNTER
FUTURE VISIT INFORMATION      SURGERY INFORMATION:  Date: 12/27/23  Location:  or  Surgeon:  Deonte Sharma MD   Anesthesia Type:  mac with block  Procedure: REVISION, FILTERING BLEB LEFT     RECORDS REQUESTED FROM:       Primary Care Provider: Shivani Clemons MD - [facility]    Pertinent Medical History: hypertension

## 2023-12-22 ENCOUNTER — ANESTHESIA EVENT (OUTPATIENT)
Dept: SURGERY | Facility: AMBULATORY SURGERY CENTER | Age: 43
End: 2023-12-22
Payer: COMMERCIAL

## 2023-12-22 ENCOUNTER — OFFICE VISIT (OUTPATIENT)
Dept: SURGERY | Facility: CLINIC | Age: 43
End: 2023-12-22
Payer: COMMERCIAL

## 2023-12-22 ENCOUNTER — PRE VISIT (OUTPATIENT)
Dept: SURGERY | Facility: CLINIC | Age: 43
End: 2023-12-22

## 2023-12-22 ENCOUNTER — LAB (OUTPATIENT)
Dept: LAB | Facility: CLINIC | Age: 43
End: 2023-12-22
Payer: COMMERCIAL

## 2023-12-22 VITALS
RESPIRATION RATE: 16 BRPM | TEMPERATURE: 97.7 F | WEIGHT: 232 LBS | BODY MASS INDEX: 45.55 KG/M2 | DIASTOLIC BLOOD PRESSURE: 92 MMHG | HEIGHT: 60 IN | SYSTOLIC BLOOD PRESSURE: 139 MMHG | HEART RATE: 77 BPM | OXYGEN SATURATION: 98 %

## 2023-12-22 DIAGNOSIS — H59.89 LEAKING OF CONJUNCTIVAL DRAINAGE BLEB: ICD-10-CM

## 2023-12-22 DIAGNOSIS — T81.31XA LEAKING OF CONJUNCTIVAL DRAINAGE BLEB: ICD-10-CM

## 2023-12-22 DIAGNOSIS — Z01.818 PRE-OPERATIVE EXAMINATION: ICD-10-CM

## 2023-12-22 DIAGNOSIS — Z01.818 PRE-OPERATIVE EXAMINATION: Primary | ICD-10-CM

## 2023-12-22 LAB
ERYTHROCYTE [DISTWIDTH] IN BLOOD BY AUTOMATED COUNT: 13.3 % (ref 10–15)
HCT VFR BLD AUTO: 41.6 % (ref 35–47)
HGB BLD-MCNC: 13.9 G/DL (ref 11.7–15.7)
MCH RBC QN AUTO: 28.1 PG (ref 26.5–33)
MCHC RBC AUTO-ENTMCNC: 33.4 G/DL (ref 31.5–36.5)
MCV RBC AUTO: 84 FL (ref 78–100)
PLATELET # BLD AUTO: 448 10E3/UL (ref 150–450)
RBC # BLD AUTO: 4.94 10E6/UL (ref 3.8–5.2)
WBC # BLD AUTO: 15.2 10E3/UL (ref 4–11)

## 2023-12-22 PROCEDURE — 36415 COLL VENOUS BLD VENIPUNCTURE: CPT | Performed by: PATHOLOGY

## 2023-12-22 PROCEDURE — 85027 COMPLETE CBC AUTOMATED: CPT | Performed by: PATHOLOGY

## 2023-12-22 PROCEDURE — 99204 OFFICE O/P NEW MOD 45 MIN: CPT | Performed by: PHYSICIAN ASSISTANT

## 2023-12-22 ASSESSMENT — ENCOUNTER SYMPTOMS: SEIZURES: 0

## 2023-12-22 ASSESSMENT — PAIN SCALES - GENERAL: PAINLEVEL: NO PAIN (0)

## 2023-12-22 NOTE — PATIENT INSTRUCTIONS
Preparing for Your Surgery      Name:  Alejandra Forbes   MRN:  [id number]   :  1980   Today's Date:  2023         Arriving for surgery:  Surgery date:  23  Arrival time:  10:00 am  Surgery time: 11:30 am    Restrictions due to COVID 19:    Please maintain social distance.  Masking is optional        parking is available for anyone with mobility limitations or disabilities. (Monday- Friday 7 am- 5 pm)    Please come to:    [facility]  [address] [place] [id number]    Check in on the 5th floor, Ambulatory Surgery Center.    What can I eat or drink?    -  You may eat and drink normally until 8 hours before arrival time  (Until 2:00 am on 23)  -  You may have clear liquids until 2 hours before arrival time  (Until 8:00 am on 23)    Examples of clear liquids:  Water  Clear broth  Juices (apple, white grape, white cranberry  and cider) without pulp  Noncarbonated, powder based beverages  (lemonade and Jordi-Aid)  Sodas (Sprite, 7-Up, ginger ale and seltzer)  Coffee or tea (without milk or cream)  Gatorade    --No alcohol or cannabis products for at least 24 hours before surgery    Which medicines can I take?      Hold Supplements for 7 days before surgery.  Hold Ibuprofen (Advil, Motrin) for 1 day before surgery--unless otherwise directed by surgeon.  Hold Naproxen (Aleve) for 4 days before surgery.     Hold Trulicty dose until after surgery.     -  DO NOT take the following medications the day of surgery:   Vitamin D    -  PLEASE TAKE the following medications the day of surgery    Albuterol inhaler as needed   Eye drops as directed   Famotidine (pepcid) as needed   Prednisone    Estradiol    Take 9 units of MPH insulin the morning of surgery instead of 18 units.      How do I prepare myself?  - Please take 2 showers (one the night prior to surgery and one the morning of surgery) using Scrubcare or Hibiclens soap.    Use this soap only from the  neck to your toes.     Leave the soap on your skin for one minute--then rinse thoroughly.      You may use your own shampoo and conditioner; no other hair products.   - Please remove all jewelry and body piercings.  - No lotions, deodorants or fragrance.  - No makeup or fingernail polish.   - Bring your ID and insurance card.    -If you have a Deep Brain Stimulator, a Spinal Cord Stimulator or any implanted Neuro device you must bring the remote to the Surgery Center          ALL PATIENTS ARE REQUIRED TO HAVE A RESPONSIBLE ADULT TO DRIVE AND BE IN ATTENDANCE WITH THEM FOR 24 HOURS FOLLOWING SURGERY       Covid testing policy as of 12/06/2022  Your surgeon will notify and schedule you for a COVID test if one is needed before surgery--please direct any questions or COVID symptoms to your surgeon      Questions or Concerns:    -For questions regarding the day of surgery please contact the Ambulatory Surgery Center at [phone].    -If you have health changes between today and your surgery please contact your surgeon.     For questions after surgery please call your surgeons office.

## 2023-12-22 NOTE — H&P
Pre-Operative H & P     CC:  Preoperative exam to assess for increased cardiopulmonary risk while undergoing surgery and anesthesia.    Date of Encounter: 12/22/2023  Primary Care Physician:  Shivani Clemons     Reason for visit:   Encounter Diagnoses   Name Primary?    Pre-operative examination Yes    Leaking of conjunctival drainage bleb        HPI  April J Andrei is a 43 year old female who presents for pre-operative H & P in preparation for  Procedure Information       Case: [id number] Date/Time: 12/27/23 1135    Procedure: REVISION, FILTERING BLEB LEFT (Left: Eye)    Anesthesia type: MAC with Block    Diagnosis: Leaking of conjunctival drainage bleb [H59.89]    Pre-op diagnosis: Leaking of conjunctival drainage bleb [H59.89]    Location: [place] [address] / [facility]    Providers: Deonte Sharma MD            The patient is a 43-year-old woman with a past medical history significant for hypertension, hyperlipidemia, exercise-induced asthma, seasonal allergies, multiple sclerosis, morbid obesity, type 2 diabetes, GERD, PCOS, anxiety, insomnia and bipolar disorder.  She has been following with ophthalmology for her uveitis.  She was seen on 12/19/2023 in Dr. Sharma's clinic for follow up of a subtle leak of the trap bleb in the left eye.  During their visit they discussed diagnosis of continue persistent leak and surgical treatment options.  The patient is now scheduled for the procedure as above.    History is obtained from the patient and chart review    Hx of abnormal bleeding or anti-platelet use: none    Menstrual history: Patient's last menstrual period was 11/30/2023 (within days).:      Past Medical History  Past Medical History:   Diagnosis Date    Allergic rhinitis, cause unspecified     no meds taken, rare flonase    Anxiety attack 08/28/2015    Asthma, exercise induced 03/01/2013    Bipolar 2 disorder (H)     Diabetes (H)     Health Care Home  02/24/2012    X  DX V65.8 REPLACED WITH [id number] (04/08/2013)    Insomnia 11/18/2014    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 06/08/2015    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    Steroid responders     Uveitis        Past Surgical History  Past Surgical History:   Procedure Laterality Date    CATARACT IOL, RT/LT Left 2006    GLAUCOMA SURGERY Bilateral 2006    YAG CAPSULOTOMY OS (LEFT EYE) Left 09/13    ZZ NONSPECIFIC PROCEDURE  4/06, 7/06    glaucoma surgery of both eyes due to steroid txt of pars planaris       Prior to Admission Medications  Current Outpatient Medications   Medication Sig Dispense Refill    albuterol (PROAIR HFA/PROVENTIL HFA/VENTOLIN HFA) 108 (90 Base) MCG/ACT inhaler Inhale 2 puffs into the lungs every 6 hours as needed for shortness of breath / dyspnea or wheezing 6.7 g 11    atorvastatin (LIPITOR) 20 MG tablet TAKE 1 TABLET BY MOUTH EVERY DAY (Patient taking differently: Take 20 mg by mouth every evening) 90 tablet 0    dorzolamide-timolol (COSOPT) 2-0.5 % ophthalmic solution Place 1 drop Into the left eye 2 times daily (Patient taking differently: Place 1 drop Into the left eye 3 times daily) 10 mL 2    dulaglutide (TRULICITY) 1.5 MG/0.5ML pen Inject 1.5 mg Subcutaneous every 7 days 6 mL 1    erythromycin (ROMYCIN) 5 MG/GM ophthalmic ointment Place 0.5 inches Into the left  eye at bedtime 3.5 g 2    famotidine (PEPCID) 20 MG tablet Take 1 tablet (20 mg) by mouth 2 times daily (Patient taking differently: Take 20 mg by mouth as needed) 90 tablet 1    ibuprofen (ADVIL,MOTRIN) 200 MG tablet Take 2-3 tablets by mouth as needed      insulin  UNIT/ML injection Inject 15 Units Subcutaneous daily (Patient taking differently: Inject Subcutaneous at bedtime 18 units in the day 20 units at night) 15 mL 0    lisinopril (ZESTRIL) 2.5 MG tablet TAKE 1 TABLET BY MOUTH EVERY DAY (Patient taking differently: Take 2.5 mg by mouth every evening) 90 tablet 0    metFORMIN (GLUCOPHAGE XR) 500 MG 24 hr tablet Take 2 tablets (1,000 mg) by mouth 2 times daily (with meals) (Patient taking differently: Take 2,000 mg by mouth every evening) 360 tablet 3    moxifloxacin (VIGAMOX) 0.5 % ophthalmic solution Place 1 drop Into the left eye every 2 hours 3 mL 1    norgestrel-ethinyl estradiol (LOW-OGESTREL) 0.3-30 MG-MCG tablet TAKE 1 TABLET BY MOUTH EVERY DAY. (Patient taking differently: Take 1 tablet by mouth every evening TAKE 1 TABLET BY MOUTH EVERY DAY.) 90 tablet 3    predniSONE (DELTASONE) 10 MG tablet Take 3 pills (30 mg) each morning with breakfast. (Patient taking differently: Take 30 mg by mouth at bedtime Take 3 pills (30 mg) each morning with breakfast.) 60 tablet 0    VITAMIN D PO Take 1 capsule by mouth daily      Continuous Blood Gluc  (FREESTYLE GAMA 14 DAY READER) MESFIN USE TO READ BLOOD SUGARS AS PER 'S INSTRUCTIONS. 1 each 0    Continuous Blood Gluc Sensor (FREESTYLE GAMA 2 SENSOR) MISC Change every 14 days. 2 each 5    CONTOUR NEXT TEST test strip Use to test blood sugar 3 times daily or as directed. 100 strip 4    glatiramer acetate 40 MG/ML injection Inject 40 mg Subcutaneous three times a week (Patient not taking: Reported on 12/22/2023) 12 mL 11    insulin pen needle (ULTICARE MICRO) 32G X 4 MM miscellaneous Use one pen needle daily 100 each 0        Allergies  Allergies   Allergen Reactions    Nkda [No Known Drug Allergy]        Social History  Social History     Socioeconomic History    Marital status:      Spouse name: Not on file    Number of children: 0    Years of education: Bachelors+    Highest education level: Not on file   Occupational History    Occupation: [employer], Director Of Orientation     Comment: [employer]   Tobacco Use    Smoking status: Never    Smokeless tobacco: Never   Vaping Use    Vaping Use: Never used   Substance and Sexual Activity    Alcohol use: Yes     Comment: 0-1 Drinks Per Week    Drug use: No    Sexual activity: Yes     Partners: Female, Male     Birth control/protection: Condom, Pill     Comment: Safe, stable relationship, male- on lo-ovral   Other Topics Concern     Service No    Blood Transfusions No    Caffeine Concern No    Occupational Exposure No    Hobby Hazards No    Sleep Concern No    Stress Concern No    Weight Concern Yes    Special Diet No    Back Care No    Exercise Yes    Bike Helmet Yes    Seat Belt Yes    Self-Exams No    Parent/sibling w/ CABG, MI or angioplasty before 65F 55M? 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    Mutually monogamous relationship (chris kebede teacher), getting  June 2014.    Shivani Clemons                 3/1/2013 4:38 PM                Social Documentation:        Balanced Diet: YES    Calcium intake: more than 2 per day    Caffeine: 10 cups per day    Exercise:  type of activity just joined [facility];  3 times per week    Sunscreen: Yes    Seatbelts:  Yes    Self Breast Exam:  No    Self Testicular Exam: n/a    Physical/Emotional/Sexual Abuse: No    Do you feel safe in your environment? Yes        Cholesterol screen up to date: No    CHOL      163   4/17/06    HDL       36   4/17/06    LDL      109   4/17/06    TRIG       86   4/17/06    CHOLHDLRATIO      4.5   4/17/06        Eye Exam  up to date: Would like to discuss, having a lot of issues    Dental Exam up to date: No    Pap smear up to date: Yes-doing today    Mammogram up to date: Does Not Apply    Dexa Scan up to date: Does Not Apply    Colonoscopy up to date: Does Not Apply    Immunizations up to date: Yes-td 2005    Glucose screen if over 40:  No                         Social Determinants of Health     Financial Resource Strain: Not on file   Food Insecurity: Not on file   Transportation Needs: Not on file   Physical Activity: Not on file   Stress: Not on file   Social Connections: Not on file   Interpersonal Safety: Not on file   Housing Stability: Not on file       Family History  Family History   Problem Relation Age of Onset    Breast Cancer Mother 60    Diabetes Father         Severe Type 2    Alcohol/Drug Father     Allergies Father     Depression Father         Bipolar    Obesity Father     Blood Disease Father         passed away from septic shock in 6/07    Allergies Brother     Lipids Maternal Grandmother     Eye Disorder Paternal Grandmother         Glaucoma, Cataracts    Osteoporosis Paternal Grandmother     Glaucoma Paternal Grandmother     Diabetes Paternal Grandfather         Type 2    Depression Paternal Grandfather     Cancer Paternal Aunt         lung, smoker    Macular Degeneration No family hx of     Anesthesia Reaction No family hx of     Deep Vein Thrombosis (DVT) No family hx of        Review of Systems  The complete review of systems is negative other than noted in the HPI or here.   Anesthesia Evaluation   Pt has had prior anesthetic. Type: General and MAC.        ROS/MED HX  ENT/Pulmonary: Comment: Uveitis     (+)           allergic rhinitis,          Intermittent, asthma  Treatment: Inhaler prn,       recent URI,  Patient has congestion with a cold that's improving:        Neurologic:     (+)                   Multiple Sclerosis,          (-) no seizures, no CVA and no TIA   Cardiovascular:     (+) Dyslipidemia  "hypertension- -   -  - -                                      METS/Exercise Tolerance: 4 - Raking leaves, gardening    Hematologic:  - neg hematologic  ROS     Musculoskeletal: Comment: Patient reports she gets a low back impulse like feeling that shoots up her spine at times      GI/Hepatic:     (+) GERD, Asymptomatic on medication,                  Renal/Genitourinary: Comment: PCOS      Endo:     (+)  type II DM, Last HgA1c: 6.9, date: 11/7/23, Using insulin, - not using insulin pump.      Chronic steroid usage for Other. Date most recently used: on prednisone for uveitis. Obesity,       Psychiatric/Substance Use:     (+) psychiatric history anxiety, bipolar and other (comment) (insomnia)       Infectious Disease:  - neg infectious disease ROS     Malignancy:  - neg malignancy ROS     Other:  - neg other ROS          BP (!) 139/92 (BP Location: Right arm, Patient Position: Sitting, Cuff Size: Adult Large)   Pulse 77   Temp 97.7  F (36.5  C) (Oral)   Resp 16   Ht 1.533 m (5' 0.35\")   Wt 105.2 kg (232 lb)   LMP 11/30/2023 (Within Days)   SpO2 98%   Breastfeeding No   BMI 44.79 kg/m      Physical Exam   Constitutional: Awake, alert, cooperative, no apparent distress, and appears stated age.  Eyes: Pupils equal, round and reactive to light, extra ocular muscles intact, sclera clear, conjunctiva normal.  HENT: Normocephalic, oral pharynx with moist mucus membranes, good dentition. No goiter appreciated.   Respiratory: Clear to auscultation bilaterally, no crackles or wheezing.  Cardiovascular: Regular rate and rhythm, normal S1 and S2, and no murmur noted.  Carotids +2, no bruits. No edema. Palpable pulses to radial  DP and PT arteries.   GI: Normal bowel sounds, soft, non-distended, non-tender, no masses palpated, no hepatosplenomegaly.    Lymph/Hematologic: No cervical lymphadenopathy and no supraclavicular lymphadenopathy.  Genitourinary:  defer  Skin: Warm and dry.  No rashes at anticipated surgical site. "   Musculoskeletal: Full ROM of neck. There is no redness, warmth, or swelling of the joints. Gross motor strength is normal.    Neurologic: Awake, alert, oriented to name, place and time. Cranial nerves II-XII are grossly intact. Gait is normal.   Neuropsychiatric: Calm, cooperative. Normal affect.     Prior Labs/Diagnostic Studies   All labs and imaging personally reviewed    Latest Reference Range & Units 11/07/23 12:13   Sodium 135 - 145 mmol/L 137   Potassium 3.4 - 5.3 mmol/L 3.7   Chloride 98 - 107 mmol/L 100   Carbon Dioxide (CO2) 22 - 29 mmol/L 22   Urea Nitrogen 6.0 - 20.0 mg/dL 15.7   Creatinine 0.51 - 0.95 mg/dL 0.59   GFR Estimate >60 mL/min/1.73m2 >90   Calcium 8.6 - 10.0 mg/dL 9.7   Anion Gap 7 - 15 mmol/L 15   Cholesterol <200 mg/dL 212 (H)   Glucose 70 - 99 mg/dL 140 (H)   HDL Cholesterol >=50 mg/dL 47 (L)   Hemoglobin A1C 0.0 - 5.6 % 6.9 (H)   LDL Cholesterol Calculated <=100 mg/dL 139 (H)   Non HDL Cholesterol <130 mg/dL 165 (H)   Triglycerides <150 mg/dL 128   (H): Data is abnormally high  (L): Data is abnormally low    EKG/ stress test - if available please see in ROS above       The patient's records and results personally reviewed by this provider.     Outside records reviewed from: Care Everywhere    LAB/DIAGNOSTIC STUDIES TODAY:     Latest Reference Range & Units 12/22/23 14:16   WBC 4.0 - 11.0 10e3/uL 15.2 (H)   Hemoglobin 11.7 - 15.7 g/dL 13.9   Hematocrit 35.0 - 47.0 % 41.6   Platelet Count 150 - 450 10e3/uL 448   RBC Count 3.80 - 5.20 10e6/uL 4.94   MCV 78 - 100 fL 84   MCH 26.5 - 33.0 pg 28.1   MCHC 31.5 - 36.5 g/dL 33.4   RDW 10.0 - 15.0 % 13.3   (H): Data is abnormally high    Leukocytosis likely from the patient's prednisone.       Assessment    Alejandra Forbes is a 43 year old female seen as a PAC referral for risk assessment and optimization for anesthesia.    Plan/Recommendations  Pt will be optimized for the proposed procedure.  See below for details on the assessment, risk, and  "preoperative recommendations    NEUROLOGY  - No history of TIA, CVA or seizure  - Multiple Sclerosis - followed by Dr. Sheffield. She hasn't been able to get the glatiramer yet. Once she gets it she will start.   -Post Op delirium risk factors:  No risk identified    ENT  - No current airway concerns.  Will need to be reassessed day of surgery.  Mallampati: I  TM: > 3  ~ Uveitis - procedure as above    CARDIAC  - Hypertension  The patient's blood pressure is slightly elevated with diastolic at 92. She takes her lisinopril at night time. Her pressure of 139/92 is acceptable for the procedure.   - Hyperlipidemia  Well controlled on home regimen  - METS (Metabolic Equivalents)  Patient performs 4 or more METS exercise without symptoms            Total Score: 0      RCRI-Low risk: Class 2 0.9% complication rate            Total Score: 1    RCRI: Diabetes        PULMONARY  - Obstructive Sleep Apnea  No current risk of obstructive sleep apnea   RUTH Low Risk            Total Score: 2    RUTH: Hypertension    RUTH: BMI over 35 kg/m2      - Asthma  Well controlled- patient reports she only uses it when she gets bronchitis  ~ The patient reports on Wednesday she felt like she was getting a cold with congestion only. She denies any fevers, chills or productive cough. Lungs are clear on exam and afebrile. Discussed with Dr. Forbes and toshia to proceed.   - Tobacco History    History   Smoking Status    Never   Smokeless Tobacco    Never       GI  - GERD  Controlled on medications: Proton Pump Inhibitor - related to trulicity  PONV High Risk  Total Score: 3           1 AN PONV: Pt is Female    1 AN PONV: Patient is not a current smoker    1 AN PONV: Intended Post Op Opioids        /RENAL  - Baseline Creatinine  0.59  ~ PCOS - on birth control    ENDOCRINE    - BMI: Estimated body mass index is 44.79 kg/m  as calculated from the following:    Height as of this encounter: 1.533 m (5' 0.35\").    Weight as of this encounter: 105.2 kg " (232 lb).  Class 3 Obesity (BMI > 40)  - Diabetes  Hemoglobin A1C (%)   Date Value   11/07/2023 6.9 (H)   07/18/2019 6.2 (H)     Diabetes Type 2, insulin dependent. Hold morning oral hypoglycemic medications and short acting insulin DOS. Take 80% of last scheduled dose of long-acting insulin prior to procedure.  Recommend close monitoring of the patient's blood glucose levels throughout the perioperative period. The patient takes her trulicity on Fridays and will hold today. She will take 50% of her AM dose of NPH. She has a freestyle amna  ~ The patient has been on prednisone for her uveitis - consideration for stress dose steroids.     HEME  VTE Low Risk 0.26%            Total Score: 1    VTE: BMI greater than 39      - No history of abnormal bleeding or antiplatelet use.      PSYCH  - Insomnia, bipolar, anxiety - not on medications and reports feels like things are controlled.     MSK  ~ The patient reports she occasionally gets an impulse like sensation up her spine.     Different anesthesia methods/types have been discussed with the patient, but they are aware that the final plan will be decided by the assigned anesthesia provider on the date of service.    Patient was discussed with Dr Forbes    The patient is optimized for their procedure. AVS with information on surgery time/arrival time, meds and NPO status given by nursing staff. No further diagnostic testing indicated.      On the day of service:     Prep time: 17 minutes  Visit time: 17 minutes  Documentation time: 13 minutes  ------------------------------------------  Total time: 47 minutes      Drea Romero PA-C  Preoperative Assessment Center  [facility]  Phone: [phone]  Fax: [phone]

## 2023-12-26 ENCOUNTER — OFFICE VISIT (OUTPATIENT)
Dept: OPHTHALMOLOGY | Facility: CLINIC | Age: 43
End: 2023-12-26
Attending: OPHTHALMOLOGY
Payer: COMMERCIAL

## 2023-12-26 DIAGNOSIS — H44.40 HYPOTONY, LEFT EYE: ICD-10-CM

## 2023-12-26 DIAGNOSIS — T81.31XA LEAKING OF CONJUNCTIVAL DRAINAGE BLEB: Primary | ICD-10-CM

## 2023-12-26 DIAGNOSIS — H59.89 LEAKING OF CONJUNCTIVAL DRAINAGE BLEB: Primary | ICD-10-CM

## 2023-12-26 RX ORDER — OXYCODONE HYDROCHLORIDE 5 MG/1
10 TABLET ORAL
Status: CANCELLED | OUTPATIENT
Start: 2023-12-26

## 2023-12-26 RX ORDER — FENTANYL CITRATE 50 UG/ML
25 INJECTION, SOLUTION INTRAMUSCULAR; INTRAVENOUS
Status: CANCELLED | OUTPATIENT
Start: 2023-12-26

## 2023-12-26 RX ORDER — ONDANSETRON 2 MG/ML
4 INJECTION INTRAMUSCULAR; INTRAVENOUS EVERY 30 MIN PRN
Status: CANCELLED | OUTPATIENT
Start: 2023-12-26

## 2023-12-26 RX ORDER — ONDANSETRON 4 MG/1
4 TABLET, ORALLY DISINTEGRATING ORAL EVERY 30 MIN PRN
Status: CANCELLED | OUTPATIENT
Start: 2023-12-26

## 2023-12-26 ASSESSMENT — EXTERNAL EXAM - LEFT EYE: OS_EXAM: NORMAL

## 2023-12-26 ASSESSMENT — VISUAL ACUITY
METHOD: SNELLEN - LINEAR
OD_CC: 20/50
OS_CC: 20/70
OD_CC+: -1
OS_CC+: -1

## 2023-12-26 ASSESSMENT — TONOMETRY
OD_IOP_MMHG: 21
OS_IOP_MMHG: --
OD_IOP_MMHG: 27
IOP_METHOD: TONOPEN
IOP_METHOD: APPLANATION
OS_IOP_MMHG: 7
IOP_METHOD: APPLANATION
OD_IOP_MMHG: 27
OS_IOP_MMHG: 07

## 2023-12-26 ASSESSMENT — REFRACTION_WEARINGRX
OD_CYLINDER: +0.25
OD_AXIS: 050
OS_CYLINDER: +5.50
OS_SPHERE: -3.75
OS_AXIS: 098
SPECS_TYPE: IN PHOROPTER
OD_SPHERE: -4.25
OS_ADD: +1.75
OD_ADD: +1.75

## 2023-12-26 ASSESSMENT — EXTERNAL EXAM - RIGHT EYE: OD_EXAM: NORMAL

## 2023-12-26 ASSESSMENT — CUP TO DISC RATIO
OS_RATIO: 0.3
OD_RATIO: 0.4

## 2023-12-26 ASSESSMENT — ENCOUNTER SYMPTOMS: SEIZURES: 0

## 2023-12-26 ASSESSMENT — SLIT LAMP EXAM - LIDS: COMMENTS: NORMAL

## 2023-12-26 NOTE — PROGRESS NOTES
Chief Complaint/Presenting Concern: Bleb Leak follow up     History of Present Illness:   Alejandra Forbes is a 43 year old patient who presents for evaluation of glaucoma. She has previously followed with Dr. Edward. S/p trab each eye in 2006. She was seen yesterday in the uveitis clinic and exam was notable for IOP of 6 in the left eye and a subtle leak of the trab bleb in the left eye. OCT macula was significant for new choroidal folds in the left eye. Moxifloxacin was started q2h in the left eye and contact lens was placed.     Today December 26 patient presents for follow up. No changes in vision. Still noticing left eye leaking at bedtime.    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 Edward)  Maximum intraocular pressure > 40 both eyes  Currently Meds: none  Family history: positive - grandmother  Trauma history: negative  Steroid exposure: negative  Meds AEs/intolerance: none  PMHx: has very mild asthma  Anticoagulants: none   Previous testing:  OCT Optic Nerve RNFL Spectralis December 5, 2023  right eye: normal thickness, no change since 2015  left eye: increased thickness/edema compared to 2015    Today's testing:  IOP 7 by applanation  AC formed   Pinpoint leak persistent, leak improved after applying timolol and dorzolamide to the eye and waiting 2 hours     Additional Ocular History:   2. 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  Previously treated with intraocular and periocular steroid injections    3. Cataract surgery with lens implant left eye s/p yag capsulotomy    4. ERM left eye> right eye     5. Bleb leak left eye   Noted yesterday by Dr. Kc, started on topical Vigamox antibiotics     Plan/Recommendations:  Discussed  findings with patient. 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 Edward. Has not been on IOP lowering drops. OCT appears stable since 2015 with slightly worsening disc edema in the left eye.   Today there is a stable pinpoint leak left eye. She is currently on oral Prednisone 30 mg daily to prevent inflammation prior to surgery tomorrow.  Continue Vigamox 3 times daily in the left eye  Stop Cosopt  Continue erythromycin ointment at bedtime or twice a day if tolerated  Continue Oral steroids as per Dr. Kc   Continue eye shield at bedtime and no eye rubbing  Informed the patient to return urgently if develops any worsening pain, redness, discharge, or drop in vision.     Follow up for surgery scheduled tomorrow 12/27 with Dr. Sharma    Physician Attestation     Carlee Rodriguez MD  PGY3 Ophthalmology Resident  [facility]    Patient discussed with Attending Physician Dr. Sharma

## 2023-12-26 NOTE — ANESTHESIA PREPROCEDURE EVALUATION
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     Steroid responders      Uveitis       Past Surgical History:   Procedure Laterality Date     CATARACT IOL, RT/LT Left      GLAUCOMA SURGERY Bilateral 2006     YAG CAPSULOTOMY OS (LEFT EYE) Left      [facility] NONSPECIFIC PROCEDURE  ,     glaucoma surgery of both eyes due to steroid txt of pars planaris      Allergies   Allergen Reactions     Nkda [No Known Drug Allergy]       Social History     Tobacco Use     Smoking status: Never     Smokeless tobacco: Never   Substance Use Topics     Alcohol use: Yes     Comment: 0-1 Drinks Per Week      Wt Readings from Last 1 Encounters:   23 105.2 kg (232 lb)        Anesthesia Evaluation   Pt has had prior anesthetic. Type:  General and MAC.        ROS/MED HX  ENT/Pulmonary: Comment: Uveitis     (+)           allergic rhinitis,          Intermittent, asthma  Treatment: Inhaler prn,       recent URI,  Patient has congestion with a cold that's improving:        Neurologic:     (+)                   Multiple Sclerosis,          (-) no seizures, no CVA and no TIA   Cardiovascular:     (+) Dyslipidemia hypertension- -   -  - -                                      METS/Exercise Tolerance: 4 - Raking leaves, gardening    Hematologic:  - neg hematologic  ROS     Musculoskeletal: Comment: Patient reports she gets a low back impulse like feeling that shoots up her spine at times      GI/Hepatic:     (+) GERD, Asymptomatic on medication,                  Renal/Genitourinary: Comment: PCOS      Endo:     (+)  type II DM, Last HgA1c: 6.9, date: 11/7/23, Using insulin, - not using insulin pump.      Chronic steroid usage for Other. Date most recently used: on prednisone for uveitis. Obesity,       Psychiatric/Substance Use:     (+) psychiatric history anxiety, bipolar and other (comment) (insomnia)       Infectious Disease:  - neg infectious disease ROS     Malignancy:  - neg malignancy ROS     Other:  - neg other ROS          Physical Exam    Airway        Mallampati: II   TM distance: > 3 FB   Neck ROM: full   Mouth opening: > 3 cm    Respiratory Devices and Support         Dental       (+) Minor Abnormalities - some fillings, tiny chips      Cardiovascular   cardiovascular exam normal       Rhythm and rate: regular     Pulmonary   pulmonary exam normal        breath sounds clear to auscultation       OUTSIDE LABS:  CBC:   Lab Results   Component Value Date    WBC 15.2 (H) 12/22/2023    WBC 8.4 01/15/2018    HGB 13.9 12/22/2023    HGB 15.0 01/15/2018    HCT 41.6 12/22/2023    HCT 46.4 01/15/2018     12/22/2023     01/15/2018     BMP:   Lab Results   Component Value Date     11/07/2023     03/14/2023    POTASSIUM 3.7  11/07/2023    POTASSIUM 4.5 03/14/2023    CHLORIDE 100 11/07/2023    CHLORIDE 103 03/14/2023    CO2 22 11/07/2023    CO2 23 03/14/2023    BUN 15.7 11/07/2023    BUN 14.3 03/14/2023    CR 0.59 11/07/2023    CR 0.72 03/14/2023     (H) 11/07/2023     (H) 03/14/2023     COAGS:   Lab Results   Component Value Date    PTT <20 (L) 05/14/2013    INR 1.05 05/14/2013     POC:   Lab Results   Component Value Date     (H) 03/27/2007    HCGS Negative 03/27/2007     HEPATIC:   Lab Results   Component Value Date    ALBUMIN 3.2 (L) 02/15/2022    PROTTOTAL 7.4 02/15/2022    ALT 22 02/15/2022    AST 10 02/15/2022    ALKPHOS 94 02/15/2022    BILITOTAL 0.4 02/15/2022     OTHER:   Lab Results   Component Value Date    A1C 6.9 (H) 11/07/2023    FLAVIO 9.7 11/07/2023    TSH 1.38 02/15/2022    T4 1.01 02/23/2016    SED 8 03/01/2013       Anesthesia Plan    ASA Status:  3    NPO Status:  NPO Appropriate    Anesthesia Type: General.     - Airway: LMA              Consents    Anesthesia Plan(s) and associated risks, benefits, and realistic alternatives discussed. Questions answered and patient/representative(s) expressed understanding.     - Discussed: Risks, Benefits and Alternatives for BOTH SEDATION and the PROCEDURE were discussed     - Discussed with:  Patient      - Extended Intubation/Ventilatory Support Discussed: No.      - Patient is DNR/DNI Status: No     Use of blood products discussed: No .     Postoperative Care    Pain management: IV analgesics, Oral pain medications.   PONV prophylaxis: Ondansetron (or other 5HT-3), Dexamethasone or Solumedrol, Background Propofol Infusion     Comments:               Monica Armendariz MD    I have reviewed the pertinent notes and labs in the chart from the past 30 days and (re)examined the patient.  Any updates or changes from those notes are reflected in this note.              # DMII: A1C = 6.9 % (Ref range: 0.0 - 5.6 %) within past 6 months  # Severe Obesity: Estimated body  "mass index is 44.79 kg/m  as calculated from the following:    Height as of 12/22/23: 1.533 m (5' 0.35\").    Weight as of 12/22/23: 105.2 kg (232 lb).      "

## 2023-12-26 NOTE — TELEPHONE ENCOUNTER
Patient is schedule for surgery with: Dr. Sharma    Surgery Date: 12/27     [facility]    H&P: already completed by PAC clinic - scheduled on 12/22     Post-op: 12/28, 1/4, 1/25    Patient will receive surgery arrival and start time from PAC. Patient is aware that if times change after they see PAC, they will receive a call from the pre-admission nurses 1-2 days prior to surgery with updated arrival time and NPO instructions.    Patient aware times are subject to change up until day before surgery.     Patient questions/concerns: N/A     Surgery packet was sent via Cristine Rodriguez on 12/26/2023 at 9:46 AM  3

## 2023-12-27 ENCOUNTER — ANESTHESIA (OUTPATIENT)
Dept: SURGERY | Facility: AMBULATORY SURGERY CENTER | Age: 43
End: 2023-12-27
Payer: COMMERCIAL

## 2023-12-27 ENCOUNTER — HOSPITAL ENCOUNTER (OUTPATIENT)
Facility: AMBULATORY SURGERY CENTER | Age: 43
Discharge: HOME | End: 2023-12-27
Attending: OPHTHALMOLOGY
Payer: COMMERCIAL

## 2023-12-27 VITALS
HEART RATE: 71 BPM | RESPIRATION RATE: 16 BRPM | SYSTOLIC BLOOD PRESSURE: 132 MMHG | HEIGHT: 60 IN | WEIGHT: 227 LBS | TEMPERATURE: 98.5 F | DIASTOLIC BLOOD PRESSURE: 75 MMHG | BODY MASS INDEX: 44.57 KG/M2 | OXYGEN SATURATION: 98 %

## 2023-12-27 DIAGNOSIS — H59.89 LEAKING OF CONJUNCTIVAL DRAINAGE BLEB: ICD-10-CM

## 2023-12-27 DIAGNOSIS — T81.31XA LEAKING OF CONJUNCTIVAL DRAINAGE BLEB: ICD-10-CM

## 2023-12-27 LAB
GLUCOSE BLDC GLUCOMTR-MCNC: 125 MG/DL (ref 70–99)
GLUCOSE BLDC GLUCOMTR-MCNC: 141 MG/DL (ref 70–99)

## 2023-12-27 PROCEDURE — 66250 FOLLOW-UP SURGERY OF EYE: CPT | Mod: LT | Performed by: OPHTHALMOLOGY

## 2023-12-27 PROCEDURE — 82962 GLUCOSE BLOOD TEST: CPT | Performed by: PATHOLOGY

## 2023-12-27 PROCEDURE — 66250 FOLLOW-UP SURGERY OF EYE: CPT | Mod: LT

## 2023-12-27 RX ORDER — PROPOFOL 10 MG/ML
INJECTION, EMULSION INTRAVENOUS PRN
Status: DISCONTINUED | OUTPATIENT
Start: 2023-12-27 | End: 2023-12-27

## 2023-12-27 RX ORDER — FENTANYL CITRATE 50 UG/ML
INJECTION, SOLUTION INTRAMUSCULAR; INTRAVENOUS PRN
Status: DISCONTINUED | OUTPATIENT
Start: 2023-12-27 | End: 2023-12-27

## 2023-12-27 RX ORDER — MOXIFLOXACIN 5 MG/ML
1 SOLUTION/ DROPS OPHTHALMIC 4 TIMES DAILY
Qty: 3 ML | Refills: 0 | Status: SHIPPED | OUTPATIENT
Start: 2023-12-27 | End: 2024-01-05

## 2023-12-27 RX ORDER — ONDANSETRON 2 MG/ML
INJECTION INTRAMUSCULAR; INTRAVENOUS PRN
Status: DISCONTINUED | OUTPATIENT
Start: 2023-12-27 | End: 2023-12-27

## 2023-12-27 RX ORDER — PREDNISOLONE ACETATE 10 MG/ML
1 SUSPENSION/ DROPS OPHTHALMIC 4 TIMES DAILY
Qty: 15 ML | Refills: 1 | Status: SHIPPED | OUTPATIENT
Start: 2023-12-27 | End: 2024-01-02

## 2023-12-27 RX ORDER — LIDOCAINE 40 MG/G
CREAM TOPICAL
Status: DISCONTINUED | OUTPATIENT
Start: 2023-12-27 | End: 2023-12-28 | Stop reason: HOSPADM

## 2023-12-27 RX ORDER — DEXAMETHASONE SODIUM PHOSPHATE 4 MG/ML
INJECTION, SOLUTION INTRA-ARTICULAR; INTRALESIONAL; INTRAMUSCULAR; INTRAVENOUS; SOFT TISSUE PRN
Status: DISCONTINUED | OUTPATIENT
Start: 2023-12-27 | End: 2023-12-27 | Stop reason: HOSPADM

## 2023-12-27 RX ORDER — ACETAMINOPHEN 325 MG/1
975 TABLET ORAL ONCE
Status: COMPLETED | OUTPATIENT
Start: 2023-12-27 | End: 2023-12-27

## 2023-12-27 RX ORDER — LIDOCAINE HYDROCHLORIDE 20 MG/ML
INJECTION, SOLUTION INFILTRATION; PERINEURAL PRN
Status: DISCONTINUED | OUTPATIENT
Start: 2023-12-27 | End: 2023-12-27

## 2023-12-27 RX ORDER — SODIUM CHLORIDE, SODIUM LACTATE, POTASSIUM CHLORIDE, CALCIUM CHLORIDE 600; 310; 30; 20 MG/100ML; MG/100ML; MG/100ML; MG/100ML
INJECTION, SOLUTION INTRAVENOUS CONTINUOUS
Status: DISCONTINUED | OUTPATIENT
Start: 2023-12-27 | End: 2023-12-28 | Stop reason: HOSPADM

## 2023-12-27 RX ORDER — DEXAMETHASONE SODIUM PHOSPHATE 4 MG/ML
INJECTION, SOLUTION INTRA-ARTICULAR; INTRALESIONAL; INTRAMUSCULAR; INTRAVENOUS; SOFT TISSUE PRN
Status: DISCONTINUED | OUTPATIENT
Start: 2023-12-27 | End: 2023-12-27

## 2023-12-27 RX ORDER — BALANCED SALT SOLUTION 6.4; .75; .48; .3; 3.9; 1.7 MG/ML; MG/ML; MG/ML; MG/ML; MG/ML; MG/ML
SOLUTION OPHTHALMIC PRN
Status: DISCONTINUED | OUTPATIENT
Start: 2023-12-27 | End: 2023-12-27 | Stop reason: HOSPADM

## 2023-12-27 RX ORDER — PROPOFOL 10 MG/ML
INJECTION, EMULSION INTRAVENOUS CONTINUOUS PRN
Status: DISCONTINUED | OUTPATIENT
Start: 2023-12-27 | End: 2023-12-27

## 2023-12-27 RX ORDER — OXYCODONE HYDROCHLORIDE 5 MG/1
5 TABLET ORAL
Status: COMPLETED | OUTPATIENT
Start: 2023-12-27 | End: 2023-12-27

## 2023-12-27 RX ADMIN — FENTANYL CITRATE 25 MCG: 50 INJECTION, SOLUTION INTRAMUSCULAR; INTRAVENOUS at 12:14

## 2023-12-27 RX ADMIN — LIDOCAINE HYDROCHLORIDE 100 MG: 20 INJECTION, SOLUTION INFILTRATION; PERINEURAL at 12:14

## 2023-12-27 RX ADMIN — PROPOFOL 150 MCG/KG/MIN: 10 INJECTION, EMULSION INTRAVENOUS at 12:14

## 2023-12-27 RX ADMIN — SODIUM CHLORIDE, SODIUM LACTATE, POTASSIUM CHLORIDE, CALCIUM CHLORIDE: 600; 310; 30; 20 INJECTION, SOLUTION INTRAVENOUS at 10:31

## 2023-12-27 RX ADMIN — FENTANYL CITRATE 50 MCG: 50 INJECTION, SOLUTION INTRAMUSCULAR; INTRAVENOUS at 12:43

## 2023-12-27 RX ADMIN — ONDANSETRON 4 MG: 2 INJECTION INTRAMUSCULAR; INTRAVENOUS at 12:25

## 2023-12-27 RX ADMIN — FENTANYL CITRATE 25 MCG: 50 INJECTION, SOLUTION INTRAMUSCULAR; INTRAVENOUS at 12:17

## 2023-12-27 RX ADMIN — OXYCODONE HYDROCHLORIDE 5 MG: 5 TABLET ORAL at 13:42

## 2023-12-27 RX ADMIN — ACETAMINOPHEN 975 MG: 325 TABLET ORAL at 10:31

## 2023-12-27 RX ADMIN — PROPOFOL 200 MG: 10 INJECTION, EMULSION INTRAVENOUS at 12:14

## 2023-12-27 RX ADMIN — DEXAMETHASONE SODIUM PHOSPHATE 4 MG: 4 INJECTION, SOLUTION INTRA-ARTICULAR; INTRALESIONAL; INTRAMUSCULAR; INTRAVENOUS; SOFT TISSUE at 12:25

## 2023-12-27 NOTE — ANESTHESIA POSTPROCEDURE EVALUATION
Patient: Alejandra Forbes    Procedure: Procedure(s):  REVISION, FILTERING BLEB LEFT       Anesthesia Type:  General    Note:  Disposition: Outpatient   Postop Pain Control: Uneventful            Sign Out: Well controlled pain   PONV: No   Neuro/Psych: Uneventful            Sign Out: Acceptable/Baseline neuro status   Airway/Respiratory: Uneventful            Sign Out: Acceptable/Baseline resp. status   CV/Hemodynamics: Uneventful            Sign Out: Acceptable CV status; No obvious hypovolemia; No obvious fluid overload   Other NRE: NONE   DID A NON-ROUTINE EVENT OCCUR? No       Last vitals:  Vitals Value Taken Time   /79 12/27/23 1345   Temp 36.2  C (97.1  F) 12/27/23 1329   Pulse 51 12/27/23 1352   Resp 17 12/27/23 1352   SpO2 97 % 12/27/23 1352   Vitals shown include unfiled device data.    Electronically Signed By: Monica Armendariz MD  December 27, 2023  2:45 PM

## 2023-12-27 NOTE — DISCHARGE INSTRUCTIONS
Discharge Instructions Following Surgery    Date: 12/27/2023    Keep eye patch in place until tomorrow. You do not need to start eye drops tonight.    For 5 days: Wear your glasses or leave the eye uncovered while awake.    Wear the eye shield every night and during daytime naps.  DO NOT use padding under eye shield.    You may notice blurred or double vision initially, this will gradually clear.     If you experience any severe pain, sudden decrease in vision, or discharge for the eye, contact your doctor immediately.     Minor itching, scratching, burning etc. is normal. Use regular or extra-strength Tylenol for discomfort.    Refrain from heavy lifting, excessive bending over, and heavy exertion for 1 week.    You may bathe or shower but do not get the eye wet.    Do not rub or push on the operative eye for 1 week.  You may wipe the lids gently with a warm wet cloth to remove matter from eyelashes.    Continue with your usual diet and medications prescribed by your doctor.    Sunglasses will increase your comfort post-operatively.    Post Operative Visit on 12/28/23  Bring all material and medications to the office on the first post-op day.  Call your surgeon at [phone] or the answering service for any problems, especially pain.            [facility]  Home Care Following Anesthesia  For 24 hours after surgery:  Get plenty of rest.  A responsible adult must stay with you for at least 24 hours after you leave the surgery center.  Do not drive or use heavy equipment.  If you have weakness or tingling, don't drive or use heavy equipment until this feeling goes away.   Do not drink alcohol.   Avoid strenuous or risky activities.  Ask for help when climbing stairs.  You may feel lightheaded.  IF so, sit for a few minutes before standing.  Have someone help you get up.   If you have nausea (feel sick to your stomach): Drink only clear liquids such as apple juice,  ginger ale, broth or 7-Up.  Rest may also help.  Be sure to drink enough fluids.  Move to a regular diet as you feel able.   You may have a slight fever.  Call the doctor if your fever is over 100 F (37.7 C) (taken under the tongue) or lasts longer than 24 hours.  You may have a dry mouth, a sore throat, muscle aches or trouble sleeping. These should go away after 24 hours.  Do not make important or legal decisions.   It is recommended to avoid smoking.               Tips for taking pain medications  To get the best pain relief possible, remember these points:  Take pain medications as directed, before pain becomes severe.  Pain medication can upset your stomach: taking it with food may help.  Constipation is a common side effect of pain medication. Drink plenty of  fluids.  Eat foods high in fiber. Take a stool softener if recommended by your doctor or pharmacist.  Do not drink alcohol, drive or operate machinery while taking pain medications.  Ask about other ways to control pain, such as with heat, ice or relaxation.    Tylenol/Acetaminophen Consumption    If you feel your pain relief is insufficient, you may take Tylenol/Acetaminophen in addition to your narcotic pain medication.   Be careful not to exceed 4,000 mg of Tylenol/Acetaminophen in a 24 hour period from all sources.  If you are taking extra strength Tylenol/acetaminophen (500 mg), the maximum dose is 8 tablets in 24 hours.  If you are taking regular strength acetaminophen (325 mg), the maximum dose is 12 tablets in 24 hours.  You received 975 mg of Tylenol at 10:31 AM today. Next dose is available at 4:31 PM as needed per package instruction.    Call a doctor for any of the following:  Signs of infection (fever, growing tenderness at the surgery site, a large amount of drainage or bleeding, severe pain, foul-smelling drainage, redness, swelling).  It has been over 8 to 10 hours since surgery and you are still not able to urinate (pass  water).  Headache for over 24 hours.  Numbness, tingling or weakness the day after surgery (if you had spinal anesthesia).  Signs of Covid-19 infection (temperature over 100 degrees, shortness of breath, cough, loss of taste/smell, generalized body aches, persistent headache, chills, sore throat, nausea/vomiting/diarrhea)  Your doctor is:       Dr. Deonte Sharma, Ophthalmology: [phone]               Or dial [phone] and ask for the resident on call for:  Ophthalmology  For emergency care, call the:  [facility]:  [phone] (TTY for hearing impaired: [phone])

## 2023-12-27 NOTE — ANESTHESIA CARE TRANSFER NOTE
Patient: April J Andrei    Procedure: Procedure(s):  REVISION, FILTERING BLEB LEFT       Diagnosis: Leaking of conjunctival drainage bleb [H59.89]  Diagnosis Additional Information: No value filed.    Anesthesia Type:   General     Note:    Oropharynx: oropharynx clear of all foreign objects and spontaneously breathing  Level of Consciousness: awake  Oxygen Supplementation: room air    Independent Airway: airway patency satisfactory and stable  Dentition: dentition unchanged  Vital Signs Stable: post-procedure vital signs reviewed and stable  Report to RN Given: handoff report given  Patient transferred to: PACU    Handoff Report: Identifed the Patient, Identified the Reponsible Provider, Reviewed the pertinent medical history, Discussed the surgical course, Reviewed Intra-OP anesthesia mangement and issues during anesthesia, Set expectations for post-procedure period and Allowed opportunity for questions and acknowledgement of understanding      Vitals:  Vitals Value Taken Time   /77 12/27/23 1326   Temp     Pulse 84 12/27/23 1328   Resp 16 12/27/23 1328   SpO2 96 % 12/27/23 1328   Vitals shown include unfiled device data.    Electronically Signed By: JERMAIN Remy CRNA  December 27, 2023  1:29 PM

## 2023-12-27 NOTE — ANESTHESIA PROCEDURE NOTES
Airway       Patient location during procedure: OR  Staff -        CRNA: Gayatri Wilkins APRN CRNA       Performed By: CRNAIndications and Patient Condition       Indications for airway management: nicolás-procedural       Induction type:intravenous       Mask difficulty assessment: 1 - vent by mask    Final Airway Details       Final airway type: supraglottic airway    Supraglottic Airway Details        Type: LMA       Brand: LMA Unique       LMA size: 4    Post intubation assessment        Placement verified by: capnometry, equal breath sounds and chest rise        Number of attempts at approach: 1       Secured with: tape       Ease of procedure: easy       Dentition: Intact and Unchanged

## 2023-12-27 NOTE — OR NURSING
Pt unable to urinate for pregnancy test. States there is no chance of pregnancy. OK to proceed per Dr. Armendariz.

## 2023-12-27 NOTE — BRIEF OP NOTE
[facility]    Brief Operative Note    Pre-operative diagnosis: Leaking of conjunctival drainage bleb [H59.89]  Post-operative diagnosis Same as pre-operative diagnosis    Procedure: REVISION, FILTERING BLEB LEFT, Left - Eye    Surgeon: Surgeon(s) and Role:     * Deonte Sharma MD - Primary     * Mago Rodriguez MD - Resident - Assisting  Anesthesia: General   Estimated Blood Loss: Minimal    Drains: None  Specimens: * No specimens in log *  Findings:   None.  Complications: None.  Implants: * No implants in log *

## 2023-12-28 ENCOUNTER — MYC MEDICAL ADVICE (OUTPATIENT)
Dept: NEUROLOGY | Facility: CLINIC | Age: 43
End: 2023-12-28

## 2023-12-28 ENCOUNTER — OFFICE VISIT (OUTPATIENT)
Dept: OPHTHALMOLOGY | Facility: CLINIC | Age: 43
End: 2023-12-28
Attending: OPHTHALMOLOGY
Payer: COMMERCIAL

## 2023-12-28 DIAGNOSIS — Z98.890 POSTOPERATIVE EYE STATE: Primary | ICD-10-CM

## 2023-12-28 DIAGNOSIS — G35 MULTIPLE SCLEROSIS (H): Primary | ICD-10-CM

## 2023-12-28 PROCEDURE — 99024 POSTOP FOLLOW-UP VISIT: CPT | Mod: GC | Performed by: OPHTHALMOLOGY

## 2023-12-28 PROCEDURE — 99214 OFFICE O/P EST MOD 30 MIN: CPT | Performed by: OPHTHALMOLOGY

## 2023-12-28 RX ORDER — LEVETIRACETAM 500 MG/1
500 TABLET ORAL 2 TIMES DAILY
Qty: 60 TABLET | Refills: 3 | Status: SHIPPED | OUTPATIENT
Start: 2023-12-28 | End: 2024-02-01

## 2023-12-28 RX ORDER — ATROPINE SULFATE 10 MG/ML
1 SOLUTION/ DROPS OPHTHALMIC 2 TIMES DAILY
Qty: 5 ML | Refills: 1 | Status: SHIPPED | OUTPATIENT
Start: 2023-12-28 | End: 2024-01-05

## 2023-12-28 ASSESSMENT — EXTERNAL EXAM - LEFT EYE: OS_EXAM: NORMAL

## 2023-12-28 ASSESSMENT — TONOMETRY
OS_IOP_MMHG: 04
IOP_METHOD: ICARE
OD_IOP_MMHG: 11
OS_IOP_MMHG: 6

## 2023-12-28 ASSESSMENT — VISUAL ACUITY
OD_CC: 20/40
OD_PH_CC: 20/40
METHOD: SNELLEN - LINEAR
CORRECTION_TYPE: GLASSES
OS_CC: 20/100
OD_PH_CC+: +1

## 2023-12-28 ASSESSMENT — REFRACTION_WEARINGRX
OD_SPHERE: -4.25
OS_ADD: +1.75
OD_ADD: +1.75
OD_CYLINDER: +0.25
OD_AXIS: 050
OS_AXIS: 098
OS_CYLINDER: +5.50
OS_SPHERE: -3.75

## 2023-12-28 ASSESSMENT — SLIT LAMP EXAM - LIDS: COMMENTS: NORMAL

## 2023-12-28 ASSESSMENT — EXTERNAL EXAM - RIGHT EYE: OD_EXAM: NORMAL

## 2023-12-28 NOTE — CONFIDENTIAL NOTE
April would like to try levetiracetam for paroxysmal symptoms. Order pended to Dr. Sheffield.    Traci West RN

## 2023-12-28 NOTE — TELEPHONE ENCOUNTER
"People with MS often get what are called paroxysmal symptoms - brief episodes of abnormal sensation (often \"zaps\" of electrical sensation or pain) or brief involuntary movements, or brief (seconds) episodes of slurred speech.  I suspect that is what she is experiencing.  The can often be treated with seizure medications.  I usually use levetiracetam, as it is well tolerated and does not require blood test monitoring.  If she would like, we can prescribe that (500 mg bid)  "

## 2023-12-28 NOTE — PROGRESS NOTES
Chief Complaint/Presenting Concern: Bleb Leak follow up, post operative visit     History of Present Illness:   Alejandra Forbes is a 43 year old patient who presents for evaluation of glaucoma. She has previously followed with Dr. Edward. S/p trab each eye in 2006. She was seen yesterday in the uveitis clinic and exam was notable for IOP of 6 in the left eye and a subtle leak of the trab bleb in the left eye. OCT macula was significant for new choroidal folds in the left eye. Moxifloxacin was started q2h in the left eye and contact lens was placed.     Today 12/28/23 patient presents for POD1 bleb revision left eye. Did not sleep well last night mostly due to steroid insomnia. Has some monocular double vision in the left eye.    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 Edward)  Maximum intraocular pressure > 40 both eyes  Currently Meds: none  Family history: positive - grandmother  Trauma history: negative  Steroid exposure: negative  Meds AEs/intolerance: none  PMHx: has very mild asthma  Anticoagulants: none   Previous testing:  OCT Optic Nerve RNFL Spectralis December 5, 2023  right eye: normal thickness, no change since 2015  left eye: increased thickness/edema compared to 2015    Today's testing:  IOP 6-7 mmHg by applanation  Conj well approximated with no leak superiorly, AC well formed with minimal inflammation     Additional Ocular History:   2. 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  Previously treated with intraocular and periocular steroid injections    3. Cataract surgery with lens implant left eye s/p yag capsulotomy    4. ERM left eye> right eye     5. Bleb leak left eye   Noted yesterday by Dr. Kc, started on topical  Vigamox antibiotics     Plan/Recommendations:  Discussed findings with patient. 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 Edward. Has not been on IOP lowering drops. OCT appears stable since 2015 with slightly worsening disc edema in the left eye. There was a pinpoint bleb leak left eye. Today POD1 s/p bleb revision, doing well, no leak.  Start atropine BID left eye  Start Prednisolone 6x/day left eye  Continue Vigamox 4 times daily in the left eye  Decrease oral Prednisone to 20 mg daily for 3 days then decrease to 15 mg daily for 3 days then decrease to 10mg daily until next appointment with Dr. Kc   Continue eye shield at bedtime and no eye rubbing  Postoperative precautions discussed    RTC in 1 week for [facility], IOP, slit lamp photos    Physician Attestation     Carlee Rodriguez MD  PGY3 Ophthalmology Resident  [facility]      Physician Attestation     Attending Physician Attestation:  Complete documentation of historical and exam elements from today's encounter can be found in the full encounter summary report (not reduplicated in this progress note). I personally obtained the chief complaint(s) and history of present illness. I confirmed and edited as necessary the review of systems, past medical/surgical history, family history, social history, and examination findings as documented by others; and I examined the patient myself. I personally reviewed the relevant tests, images, and reports as documented above.I formulated and edited as necessary the assessment and plan and discussed the findings and management plan with the patient and any family members present at the time of the visit.  Deonte Sharma M.D., Glaucoma, December 28, 2023

## 2023-12-28 NOTE — NURSING NOTE
Chief Complaints and History of Present Illnesses   Patient presents with    Post Op (Ophthalmology) Left Eye     POD #1 s/p Revision, Filtering Bleb Left Eye 12/27/2023     Chief Complaint(s) and History of Present Illness(es)       Post Op (Ophthalmology) Left Eye              Comments: POD #1 s/p Revision, Filtering Bleb Left Eye 12/27/2023              Comments    Pt did not sleep well last night. LE sore with FB sensation in the corners of Left eye.  No new flashes or floaters.  DM2 BS: 158 this morning per pt.  Lab Results       Component                Value               Date                       A1C                      6.9                 11/07/2023                 A1C                      7.1                 03/14/2023                 A1C                      6.6                 06/20/2022                 A1C                      8.0                 02/15/2022                 A1C                      6.2                 07/18/2019                 A1C                      6.1                 10/01/2018                 A1C                      6.5                 05/04/2018                 A1C                      7.1                 01/15/2018                 A1C                      7.3                 06/08/2015              ROGER Loaiza December 28, 2023 8:40 AM

## 2023-12-28 NOTE — PATIENT INSTRUCTIONS
Increase Viagmox to 4 times per day in the left eye    Start Prednisolone 6 times a day left eye    Start atropine once daily in the  left eye    Decrease oral Prednisone to 20 mg daily for 3 days then decrease to 15 mg daily for 3 days then decrease to 10mg daily until next appointment with Dr. Kc

## 2024-01-01 NOTE — OP NOTE
April FRANCIS Forbes  [id number]  12/27/2023    PREOPERATIVE DIAGNOSIS: Leaking of conjunctival drainage bleb, left eye     POSTOPERATIVE DIAGNOSIS: Same as pre-operative diagnosis    OPERATION PERFORMED: Procedure(s):  REVISION OF LEAKING FILTERING BLEB LEFT EYE     SURGEON:  Deonte Sharma MD- Primary Surgeon   Carlee Rodriguez MD- Assisting Resident     ANESTHESIA: General     COMPLICATIONS:  none    FINDINGS:  Anatomy as expected    ESTIMATED BLOOD LOSS:   minimal    SPECIMENS:  * No specimens in log *    IMPLANTS:  * No implants in log *    PROCEDURE:  General anesthesia  Betadine paint and drop in holding room  Prep and drape in usual manner in OR  Time out according to protocol  Superior peripheral corneal traction suture  Superior fornix-based conjunctival flap performed through gentle dissection of the conjunctiva from the ischemic avascular bleb. The ischemia avascular conjunctiva was cut and incised.   Bleeding controlled with cautery  Paracentesis track made at 5 o'clock  Injection of balanced salt through paracentesis with adequate chamber maintenance and fluid egress  Closure of conjunctival flap with  Interrupted and running 8-0 vicryl sutures.    Reinjection of balanced salt into anterior chamber with bleb elevation   no leaks or buttonholes found  Subconjunctival injection of antibiotic and steroid  Removal of traction suture   Topical atropine drops  Topical antibiotic/steroid ointment  Dry sterile dressing    The patient tolerated the procedure well. There were no unexpected findings or complications.

## 2024-01-02 DIAGNOSIS — H59.89 LEAKING OF CONJUNCTIVAL DRAINAGE BLEB: Primary | ICD-10-CM

## 2024-01-02 DIAGNOSIS — T81.31XA LEAKING OF CONJUNCTIVAL DRAINAGE BLEB: Primary | ICD-10-CM

## 2024-01-04 ENCOUNTER — VIRTUAL VISIT (OUTPATIENT)
Dept: PHARMACY | Facility: CLINIC | Age: 44
End: 2024-01-04
Payer: COMMERCIAL

## 2024-01-04 DIAGNOSIS — E11.65 TYPE 2 DIABETES MELLITUS WITH HYPERGLYCEMIA, WITHOUT LONG-TERM CURRENT USE OF INSULIN (H): Primary | ICD-10-CM

## 2024-01-04 DIAGNOSIS — G35 MULTIPLE SCLEROSIS (H): ICD-10-CM

## 2024-01-04 PROCEDURE — 99607 MTMS BY PHARM ADDL 15 MIN: CPT | Mod: 95 | Performed by: PHARMACIST

## 2024-01-04 PROCEDURE — 99606 MTMS BY PHARM EST 15 MIN: CPT | Mod: 95 | Performed by: PHARMACIST

## 2024-01-04 NOTE — PROGRESS NOTES
Medication Therapy Management (MTM) Encounter    ASSESSMENT:                            Medication Adherence/Access: No issues identified    Diabetes   Will monitor BG closely as prednisone taper continues.   Discussed resuming Trulicity vs. Changing to a different agent (I.e. SGLT2i) - she would like to resume Trulicity (known vs. Unknown efficacy/side effects) at a lower dose. Discussed CV/renal benefit of GLP1/SGLT2i compared to insulin.     MS:   May benefit from techniques to minimize injection site reactions     PLAN:                            If your daytime blood sugars continue to be low, stop the morning NPH dose.  As you continue to decrease prednisone and restart Trulicity we'll likely need to decrease the evening dose as well.   Restart Trulicity 0.75mg once a week - you can take this for at least 1-2 weeks, but after that you can increase to 1.5mg weekly again if you are not nauseated.   Here's some tips to help with the injection site reactions from Copaxone:    Apply a warm compress to the injection site (with a cloth barrier between the warm compress and bare skin) for 5 minutes to help relax the tissue before cleaning the site and injecting.   After the injection, use a cold pack (with a cloth barrier between the cold pack and bare skin) on the injection site for up to 1 minute.  Do not rub the injection site after injecting and avoid injecting into areas where clothing rubs (like the belt line).   For itching that persists, you can use over-the-counter hydrocortisone cream or sometimes an oral antihistamine (like Claritin or Zyrtec) can help.     Follow-up: I'll check in with you on Mychart early next week to see how your blood sugars are doing.     SUBJECTIVE/OBJECTIVE:                          Alejandra Forbes is a 43 year old female contacted via secure video for a follow-up visit from 12/14/23.       Reason for visit: Ongoing hyperglycemia during steroid treatment (see previous visits for details  and [employer] message from 12/21)    Allergies/ADRs: Reviewed in chart  Past Medical History: Reviewed in chart  Tobacco: She reports that she has never smoked. She has never used smokeless tobacco.  Alcohol: 1-3 beverages / week    Medication Adherence/Access: no issues reported    Diabetes   Metformin ER 500mg 2 tabs twice daily   NPH 10 units in the AM and 30 units in the PM - decreased morning dose yesterday from 18 units due to daytime lows (prednisone dose has also been decreased).   Trulicity 1.5mg weekly - has been on hold for the last 2 weeks d/t surgery. She is concerned about restarting due to potential for nausea/vomiting.   Blood sugar monitoring: Continuous Glucose Monitor see AGP below       Eye exam is up to date  Foot exam is up to date    MS:   Copaxone 40mg/mL three times per week - just started. Has been on this in the past and had some injection site reactions, which have occurred with the restart  Keppra 500mg twice daily (started at the end of Dec) - noticed an improvement in some symptoms the day after starting. Some nausea since starting.   Prednisone 10mg with plans to continue taper/discontinue.   She is closely following with her neurology and opthalmology team.   ----------------  I spent 27 minutes with this patient today. All changes were made via collaborative practice agreement with Shivani Clemons MD. A copy of the visit note was provided to the patient's provider(s).    A summary of these recommendations was sent via [employer].    Cheerlle Almanzar, [place], [place], BCACP  MTM Pharmacist, [facility]     Telemedicine Visit Details  Type of service:  Video Conference via [employer]  Joined the call at 1/4/2024, 9:05:19 am.  Left the call at 1/4/2024, 9:32:22 am.  You were on the call for 27 minutes 2 seconds     Medication Therapy Recommendations  No medication therapy recommendations to display

## 2024-01-04 NOTE — PATIENT INSTRUCTIONS
"Recommendations from today's MTM visit:                                                      If your daytime blood sugars continue to be low, stop the morning NPH dose.  As you continue to decrease prednisone and restart Trulicity we'll likely need to decrease the evening dose as well.   Restart Trulicity 0.75mg once a week - you can take this for at least 1-2 weeks, but after that you can increase to 1.5mg weekly again if you are not nauseated.   Here's some tips to help with the injection site reactions from Copaxone:    Apply a warm compress to the injection site (with a cloth barrier between the warm compress and bare skin) for 5 minutes to help relax the tissue before cleaning the site and injecting.   After the injection, use a cold pack (with a cloth barrier between the cold pack and bare skin) on the injection site for up to 1 minute.  Do not rub the injection site after injecting and avoid injecting into areas where clothing rubs (like the belt line).   For itching that persists, you can use over-the-counter hydrocortisone cream or sometimes an oral antihistamine (like Claritin or Zyrtec) can help.     Follow-up: I'll check in with you on Mychart early next week to see how your blood sugars are doing.     It was great speaking with you today.  I value your experience and would be very thankful for your time in providing feedback in our clinic survey. In the next few days, you may receive an email or text message from [employer] with a link to a survey related to your  clinical pharmacist.\"     To schedule another MTM appointment, please call the clinic directly or you may call the MTM scheduling line at [phone] or toll-free at [phone].     My Clinical Pharmacist's contact information:                                                      Please feel free to contact me with any questions or concerns you have.      Cherelle Almanzar, Pharm.D., M.B.A., BCACP  MTM Pharmacist, [facility]  Pager: [phone]  Email: [email or web address]

## 2024-01-05 ENCOUNTER — OFFICE VISIT (OUTPATIENT)
Dept: OPHTHALMOLOGY | Facility: CLINIC | Age: 44
End: 2024-01-05
Attending: OPHTHALMOLOGY
Payer: COMMERCIAL

## 2024-01-05 DIAGNOSIS — T81.31XA LEAKING OF CONJUNCTIVAL DRAINAGE BLEB: ICD-10-CM

## 2024-01-05 DIAGNOSIS — H59.89 LEAKING OF CONJUNCTIVAL DRAINAGE BLEB: ICD-10-CM

## 2024-01-05 DIAGNOSIS — E11.65 TYPE 2 DIABETES MELLITUS WITH HYPERGLYCEMIA, WITHOUT LONG-TERM CURRENT USE OF INSULIN (H): ICD-10-CM

## 2024-01-05 DIAGNOSIS — H30.23 INTERMEDIATE UVEITIS OF BOTH EYES: Primary | ICD-10-CM

## 2024-01-05 DIAGNOSIS — G35 MULTIPLE SCLEROSIS (H): ICD-10-CM

## 2024-01-05 DIAGNOSIS — H31.402 CHOROIDAL DETACHMENT OF LEFT EYE: ICD-10-CM

## 2024-01-05 DIAGNOSIS — H44.40 HYPOTONY, LEFT EYE: ICD-10-CM

## 2024-01-05 DIAGNOSIS — Z98.890 POSTOPERATIVE EYE STATE: Primary | ICD-10-CM

## 2024-01-05 DIAGNOSIS — H35.353 CYSTOID MACULAR EDEMA OF BOTH EYES: ICD-10-CM

## 2024-01-05 PROCEDURE — 999N000103 HC STATISTIC NO CHARGE FACILITY FEE

## 2024-01-05 PROCEDURE — 99024 POSTOP FOLLOW-UP VISIT: CPT | Performed by: OPHTHALMOLOGY

## 2024-01-05 PROCEDURE — 99213 OFFICE O/P EST LOW 20 MIN: CPT | Mod: 24 | Performed by: OPHTHALMOLOGY

## 2024-01-05 PROCEDURE — 99211 OFF/OP EST MAY X REQ PHY/QHP: CPT | Performed by: OPHTHALMOLOGY

## 2024-01-05 PROCEDURE — 99211 OFF/OP EST MAY X REQ PHY/QHP: CPT | Mod: 27 | Performed by: OPHTHALMOLOGY

## 2024-01-05 PROCEDURE — 92134 CPTRZ OPH DX IMG PST SGM RTA: CPT | Performed by: OPHTHALMOLOGY

## 2024-01-05 RX ORDER — PREDNISONE 5 MG/1
TABLET ORAL
Qty: 60 TABLET | Refills: 1 | Status: SHIPPED | OUTPATIENT
Start: 2024-01-05 | End: 2024-02-20

## 2024-01-05 RX ORDER — DULAGLUTIDE 1.5 MG/.5ML
1.5 INJECTION, SOLUTION SUBCUTANEOUS
Refills: 1 | OUTPATIENT
Start: 2024-01-05

## 2024-01-05 ASSESSMENT — REFRACTION_WEARINGRX
OS_CYLINDER: +5.50
OD_AXIS: 055
SPECS_TYPE: PAL
OS_CYLINDER: +5.50
SPECS_TYPE: PAL
OD_ADD: +1.75
OS_SPHERE: -3.25
OD_SPHERE: -4.50
OS_ADD: +1.75
OD_CYLINDER: +0.25
OD_ADD: +1.75
OD_AXIS: 055
OS_AXIS: 095
OD_CYLINDER: +0.25
OS_ADD: +1.75
OD_SPHERE: -4.50
OS_SPHERE: -3.25
OS_AXIS: 095

## 2024-01-05 ASSESSMENT — CONF VISUAL FIELD
OD_SUPERIOR_NASAL_RESTRICTION: 0
OS_INFERIOR_NASAL_RESTRICTION: 0
OS_SUPERIOR_NASAL_RESTRICTION: 0
OS_NORMAL: 1
OD_NORMAL: 1
OS_INFERIOR_TEMPORAL_RESTRICTION: 0
OD_INFERIOR_NASAL_RESTRICTION: 0
OS_NORMAL: 1
OD_INFERIOR_TEMPORAL_RESTRICTION: 0
OS_SUPERIOR_TEMPORAL_RESTRICTION: 0
METHOD: COUNTING FINGERS
OS_INFERIOR_TEMPORAL_RESTRICTION: 0
OD_NORMAL: 1
METHOD: COUNTING FINGERS
OD_SUPERIOR_NASAL_RESTRICTION: 0
OS_INFERIOR_NASAL_RESTRICTION: 0
OD_SUPERIOR_TEMPORAL_RESTRICTION: 0
OD_SUPERIOR_TEMPORAL_RESTRICTION: 0
OS_SUPERIOR_TEMPORAL_RESTRICTION: 0
OD_INFERIOR_TEMPORAL_RESTRICTION: 0
OD_INFERIOR_NASAL_RESTRICTION: 0
OS_SUPERIOR_NASAL_RESTRICTION: 0

## 2024-01-05 ASSESSMENT — TONOMETRY
IOP_METHOD: APPLANATION
IOP_METHOD: APPLANATION
OS_IOP_MMHG: 6
OS_IOP_MMHG: 6
IOP_METHOD: APPLANATION
OD_IOP_MMHG: 15
OD_IOP_MMHG: 15
OS_IOP_MMHG: 7
OD_IOP_MMHG: 13

## 2024-01-05 ASSESSMENT — VISUAL ACUITY
METHOD: SNELLEN - LINEAR
CORRECTION_TYPE: GLASSES
CORRECTION_TYPE: GLASSES
OD_CC: 20/40
OS_CC: 20/70
METHOD: SNELLEN - LINEAR
OD_CC: 20/40
OS_CC+: -2
OD_CC+: -1
OS_CC+: -2
OS_CC: 20/70
OD_CC+: -1

## 2024-01-05 ASSESSMENT — EXTERNAL EXAM - LEFT EYE
OS_EXAM: NORMAL
OS_EXAM: NORMAL

## 2024-01-05 ASSESSMENT — SLIT LAMP EXAM - LIDS
COMMENTS: NORMAL
COMMENTS: NORMAL

## 2024-01-05 ASSESSMENT — CUP TO DISC RATIO
OS_RATIO: 0.3
OD_RATIO: 0.4
OS_RATIO: 0.3

## 2024-01-05 ASSESSMENT — EXTERNAL EXAM - RIGHT EYE
OD_EXAM: NORMAL
OD_EXAM: NORMAL

## 2024-01-05 NOTE — PROGRESS NOTES
Chief Complaint/Presenting Concern: Bleb Leak follow up, post operative visit     History of Present Illness:   Alejandra Forbes is a 43 year old patient who presents for evaluation of glaucoma. She has previously followed with Dr. Edward. S/p trab each eye in 2006. She was seen yesterday in the uveitis clinic and exam was notable for IOP of 6 in the left eye and a subtle leak of the trab bleb in the left eye. OCT macula was significant for new choroidal folds in the left eye. Moxifloxacin was started q2h in the left eye and contact lens was placed.     Today 1/5/24 patient presents for POW1 bleb revision left eye. Resolved double vision and improving vision left eye.    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 Edward)  Maximum intraocular pressure > 40 both eyes  Currently Meds: none  Family history: positive - grandmother  Trauma history: negative  Steroid exposure: negative  Meds AEs/intolerance: none  PMHx: has very mild asthma  Anticoagulants: none   Previous testing:  OCT Optic Nerve RNFL Spectralis December 5, 2023  right eye: normal thickness, no change since 2015  left eye: increased thickness/edema compared to 2015    Today's testing:  IOP 7 mmHg by applanation left eye  Conj well approximated with no leak superiorly, AC well formed with minimal inflammation   Slit lamp photos today    Additional Ocular History:   2. 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  Previously treated with intraocular and periocular steroid injections    3. Cataract surgery with lens implant left eye s/p yag capsulotomy    4. ERM left eye> right eye     5. Bleb leak left eye   Noted yesterday by Dr. Kc, started on topical Vigamox antibiotics      Plan/Recommendations:  Discussed findings with patient. 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 Edward. Has not been on IOP lowering drops. OCT appears stable since 2015 with slightly worsening disc edema in the left eye. There was a pinpoint bleb leak left eye. Today POW1 s/p bleb revision, doing well, no leak.  Stop atropine  Decrease Prednisolone to QID left eye  Continue Vigamox 4 times daily in the left eye for 2 more days then stop  Continue oral prednisone 10mg daily until next appointment with Dr. Kc (seeing him today)  Continue eye shield at bedtime and no eye rubbing  Postoperative precautions discussed    RTC in 2 weeks for VA, IOP    Physician Attestation     Carlee Rodriguez MD  PGY3 Ophthalmology Resident  [facility]      Physician Attestation     Attending Physician Attestation:  Complete documentation of historical and exam elements from today's encounter can be found in the full encounter summary report (not reduplicated in this progress note). I personally obtained the chief complaint(s) and history of present illness. I confirmed and edited as necessary the review of systems, past medical/surgical history, family history, social history, and examination findings as documented by others; and I examined the patient myself. I personally reviewed the relevant tests, images, and reports as documented above. I formulated and edited as necessary the assessment and plan and discussed the findings and management plan with the patient and any family members present at the time of the visit.  Deonte Sharma M.D., Glaucoma, January 5, 2024

## 2024-01-05 NOTE — NURSING NOTE
Chief Complaints and History of Present Illnesses   Patient presents with    Uveitis Follow-Up     Chief Complaint(s) and History of Present Illness(es)       Uveitis Follow-Up              Laterality: both eyes    Onset: gradual    Quality: VA improved since last visit    Frequency: intermittently    Course: stable    Associated symptoms: eye pain, photophobia and floaters.  Negative for double vision and flashes    Treatments tried: eye drops              Comments    Here for intermediate uveitis both eyes.     Atropine BID left eye  PF 6x/day left eye  Vigamox QID left eye  Prednisone 10mg daily    Ra UPTON 8:21 AM January 5, 2024

## 2024-01-05 NOTE — LETTER
1/5/2024       RE: Alejandra Forbes  [address]  [place] [address]     Dear Colleague,    Thank you for referring your patient, Alejandra Forbes, to the [facility] [place] [facility]. Please see a copy of my visit note below.    Chief Complaint/Presenting Concern:  Uveitis post op     Interval History of Present Ocular Illness:  Alejandra Forbes is a 43 year old patient who returns for follow up of her intermediate uveitis of both eyes. At last visit, she had a leaking conjunctival bleb which resulted in hypotony maculopathy and inferior choroidal detachment. We held off on uveitis treatment to prevent infection of bleb leak at that time.    April has been following with Dr. Sharma frequently and underwent bleb revision on 12/27/23. She did not have a bleb leak at POD1 visit on 12/28/23. Today, she reports things have been stable in terms of her eyes and postoperative visits with Dr. Sharma. No significant vision changes, flashes or floaters. She occasional has a tinge of left eye discomfort with the electrical sensations throughout her body. These only last about 1 second and then resolve. Dr. Sharma recommended tapering steroid drops as there is no leak. Right eye doing fine.     Interval Updates to Medical/Family/Social History:    Saw diabetes management clinic yesterday 1/4/24. Her blood sugars have been running low so they discontinued her morning NPH dose. Due to plans for decreasing steroids, they also restarted Trulicity and may decrease evening dose moving forward.  Saw Dr. Sheffield last week. She was having some MS symptoms such as shooting electric sensations throughout her body that typically occur between 1-4pm in the afternoon and 1-4am overnight. These actually got better on prednisone and also after the general anesthesia from surgery.These occur down her back and up to her left eye. Based on these symptoms,  Dr. Sheffield decided to start her Keppra 500mg 2x daily (and also got started on Copaxone last week). This has slightly reduced the frequency of symptoms for her.        Relevant Review of Systems Updates:    Shooting electrical sensations throughout her body starting right lower back and comes up spine to back of head and to left eye.  Sleeping and blood sugar issues.     Laboratory Testin23: CBC WNL other than elevated WBC      Current eye related medications:   Drops: Atropine 2x daily in the left eye, Prednisolone 6x daily in the left eye, Vigamox 4x daily in the left eye  Systemic: Copaxone 3x/week, Oral prednisone now 10mg daily (this dose past 2 days).    Retina/Uveitis Imaging:   OCT Spectralis Macula 2024  right eye: Mildly irregular foveal contour due to ERM, no intraretinal or subretinal fluid. Overall stable. No NFL Thickening  left eye: ERM, few cysts improved. Much improved choroidal folds    Assessment:     1. Intermediate uveitis of both eyes  Right eye quiet. Left eye with minimal inflammation after bleb revision.    2. Cystoid macular edema of both eyes  No fluid on OCT right eye, improving left eye     3. Leaking of conjunctival drainage bleb  Now s/p bleb revision with Dr. Sharma on 23. No leak noted today.    4. Hypotony, left eye  IOP 6 today    5. Choroidal detachment of left eye  Resolved, few folds in macula which have also improved with normalized pressure and improving inflammation     6. Multiple sclerosis (H)  She follows with Dr. Sheffield, next appointment 24.Started Copaxone and Keppra last week    Plan/Recommendations:  *For billing, this is an OCT coded visit, no office visit charge*  Discussed findings with patient. The left eye is doing well with minimal inflammation after bleb revision. There are no signs of infection. We will slowly taper oral steroid as we are tapering steroid drops.   Eye pressure is still low in the left eye at 6. She is  following with Dr. Sharma. No signs of bleb leak on exam today. Dr. Alves planning to decrease eye drops as below.The choroidal folds are better and there is no choroidal detachments.   Recommend additional testing: None needed  Stop atropine left eye per Dr. Sharma  Continue Viagmox to 4 times per day in the left eye for 2 more days then stop per Dr. Sharma.   Decrease Prednisolone to 4x daily in the left eye  For the prednisone tablets, continue 10 mg each AM for two weeks (until 1/19/24). Starting on 1/20/24, reduce to 5 mg each AM until next visit.       Continue Copaxone and Keppra with Dr. Sheffield. No other treatments needed at this time. If the inflammation in the seems to recur off prednisone and while on Copaxone, we might ask Dr. Sheffield about possibly adding Mycophenolate if needed    RTC Change 1/24 Argenis and 1/25 Sheheitli. Move to 1/23 Sheheitli and JY same day, AC Check and dilate left eye, no testing    Mya Murray MD  Resident Physician, PGY-3  Department of Ophthalmology    Attending Physician Attestation:  Complete documentation of historical and exam elements from today's encounter can be found in the full encounter summary report (not reduplicated in this progress note). I personally obtained the chief complaint(s) and history of present illness. I confirmed and edited as necessary the review of systems, past medical/surgical history, family history, social history, and examination findings as documented by others; and I examined the patient myself. I personally reviewed the relevant tests, images, and reports as documented above. I formulated and edited as necessary the assessment and plan and discussed the findings and management plan with the patient and family.  Sameer Kc MD.      Again, thank you for allowing me to participate in the care of your patient.      Sincerely,    Sameer Kc MD  [facility] Dept of Ophthalmology  Uveitis and  Medical Retina

## 2024-01-05 NOTE — NURSING NOTE
Chief Complaints and History of Present Illnesses   Patient presents with    Glaucoma Follow-Up     Chief Complaint(s) and History of Present Illness(es)       Glaucoma Follow-Up              Laterality: both eyes    Associated symptoms: eye pain (mild discomfort), photophobia and floaters.  Negative for double vision and flashes    Compliance with Treatment: always              Comments    Here for glaucoma follow up. VA has improved. Some discomfort. Stable floaters without flashes. Compliant with drops.    Ra UPTON 8:19 AM January 5, 2024

## 2024-01-05 NOTE — PATIENT INSTRUCTIONS
Continue Viagmox to 4 times per day in the left eye for 2 more days then stop    Decrease Prednisolone to 4 times a day left eye    Stop atropine    Oral Prednisone per Dr. Kc

## 2024-01-05 NOTE — PROGRESS NOTES
Chief Complaint/Presenting Concern:  Uveitis post op     Interval History of Present Ocular Illness:  Alejandra Forbes is a 43 year old patient who returns for follow up of her intermediate uveitis of both eyes. At last visit, she had a leaking conjunctival bleb which resulted in hypotony maculopathy and inferior choroidal detachment. We held off on uveitis treatment to prevent infection of bleb leak at that time.    April has been following with Dr. Sharma frequently and underwent bleb revision on 12/27/23. She did not have a bleb leak at POD1 visit on 12/28/23. Today, she reports things have been stable in terms of her eyes and postoperative visits with Dr. Sharma. No significant vision changes, flashes or floaters. She occasional has a tinge of left eye discomfort with the electrical sensations throughout her body. These only last about 1 second and then resolve. Dr. Sharma recommended tapering steroid drops as there is no leak. Right eye doing fine.     Interval Updates to Medical/Family/Social History:    Saw diabetes management clinic yesterday 1/4/24. Her blood sugars have been running low so they discontinued her morning NPH dose. Due to plans for decreasing steroids, they also restarted Trulicity and may decrease evening dose moving forward.  Saw Dr. Sheffield last week. She was having some MS symptoms such as shooting electric sensations throughout her body that typically occur between 1-4pm in the afternoon and 1-4am overnight. These actually got better on prednisone and also after the general anesthesia from surgery.These occur down her back and up to her left eye. Based on these symptoms, Dr. Sheffield decided to start her Keppra 500mg 2x daily (and also got started on Copaxone last week). This has slightly reduced the frequency of symptoms for her.    Relevant Review of Systems Updates:    Shooting electrical sensations throughout her body starting right lower back and comes up spine to back of  head and to left eye.  Sleeping and blood sugar issues.     Laboratory Testin23: CBC WNL other than elevated WBC      Current eye related medications:   Drops: Atropine 2x daily in the left eye, Prednisolone 6x daily in the left eye, Vigamox 4x daily in the left eye  Systemic: Copaxone 3x/week, Oral prednisone now 10mg daily (this dose past 2 days).    Retina/Uveitis Imaging:   OCT Spectralis Macula 2024  right eye: Mildly irregular foveal contour due to ERM, no intraretinal or subretinal fluid. Overall stable. No NFL Thickening  left eye: ERM, few cysts improved. Much improved choroidal folds      Assessment:     1. Intermediate uveitis of both eyes  Right eye quiet. Left eye with minimal inflammation after bleb revision.    2. Cystoid macular edema of both eyes  No fluid on OCT right eye, improving left eye     3. Leaking of conjunctival drainage bleb  Now s/p bleb revision with Dr. Sharma on 23. No leak noted today.    4. Hypotony, left eye  IOP 6 today    5. Choroidal detachment of left eye  Resolved, few folds in macula which have also improved with normalized pressure and improving inflammation     6. Multiple sclerosis (H)  She follows with Dr. Sheffield, next appointment 24.Started Copaxone and Keppra last week    Plan/Recommendations:  *For billing, this is an OCT coded visit, no office visit charge*  Discussed findings with patient. The left eye is doing well with minimal inflammation after bleb revision. There are no signs of infection. We will slowly taper oral steroid as we are tapering steroid drops.   Eye pressure is still low in the left eye at 6. She is following with Dr. Sharma. No signs of bleb leak on exam today. Dr. Alves planning to decrease eye drops as below.The choroidal folds are better and there is no choroidal detachments.   Recommend additional testing: None needed  Stop atropine left eye per Dr. Sharma  Continue Viagmox to 4 times per day in  the left eye for 2 more days then stop per Dr. Sharma.   Decrease Prednisolone to 4x daily in the left eye  For the prednisone tablets, continue 10 mg each AM for two weeks (until 1/19/24). Starting on 1/20/24, reduce to 5 mg each AM until next visit.   Continue Copaxone and Keppra with Dr. Sheffield. No other treatments needed at this time. If the inflammation in the seems to recur off prednisone and while on Copaxone, we might ask Dr. Sheffield about possibly adding Mycophenolate if needed    RTC Change 1/24 Argenis and 1/25 Sheheitli. Move to 1/23 Sheheitli and JY same day, AC Check and dilate left eye, no testing    Mya Murray MD  Resident Physician, PGY-3  Department of Ophthalmology    Attending Physician Attestation:  Complete documentation of historical and exam elements from today's encounter can be found in the full encounter summary report (not reduplicated in this progress note). I reviewed the chief complaint(s) and history of present illness, and  confirmed and edited as necessary the review of systems, past medical/surgical history, family history, social history, and examination findings as documented by others and the treating Resident or Fellow Physician.    I examined the patient myself, discussed the findings, reviewed all ancillary testing data and modified these results and reports along with the assessment and plan with the Treating Resident or Fellow Physician. I agree with the note as detailed above.   Sameer Kc M.D.  Uveitis and Medical Retina  January 5, 2024

## 2024-01-05 NOTE — PATIENT INSTRUCTIONS
Stop atropine left eye per Dr. Sharma  Continue Viagmox to 4 times per day in the left eye for 2 more days then stop per Dr. Sharma.   Decrease Prednisolone to 4x daily in the left eye  For the prednisone tablets, continue 10 mg each AM for two weeks (until 1/19/24). Starting on 1/20/24, reduce to 5 mg each AM until next visit.   Continue Copaxone and Keppra with Dr. Sheffield. No other treatments needed at this time. If the inflammation in the seems to recur off prednisone and while on Copaxone, we might ask Dr. Sheffield about possibly adding Mycophenolate if needed

## 2024-01-11 DIAGNOSIS — F31.81 BIPOLAR 2 DISORDER (H): ICD-10-CM

## 2024-01-11 RX ORDER — BUPROPION HYDROCHLORIDE 300 MG/1
300 TABLET ORAL EVERY MORNING
Qty: 90 TABLET | Refills: 1 | Status: SHIPPED | OUTPATIENT
Start: 2024-01-11

## 2024-01-14 DIAGNOSIS — E11.65 TYPE 2 DIABETES MELLITUS WITH HYPERGLYCEMIA, WITHOUT LONG-TERM CURRENT USE OF INSULIN (H): ICD-10-CM

## 2024-01-18 DIAGNOSIS — E11.65 TYPE 2 DIABETES MELLITUS WITH HYPERGLYCEMIA, WITHOUT LONG-TERM CURRENT USE OF INSULIN (H): ICD-10-CM

## 2024-01-18 RX ORDER — LISINOPRIL 2.5 MG/1
2.5 TABLET ORAL DAILY
Qty: 90 TABLET | Refills: 0 | OUTPATIENT
Start: 2024-01-18

## 2024-01-18 RX ORDER — METFORMIN HCL 500 MG
1000 TABLET, EXTENDED RELEASE 24 HR ORAL 2 TIMES DAILY WITH MEALS
Qty: 360 TABLET | Refills: 3 | OUTPATIENT
Start: 2024-01-18

## 2024-01-18 RX ORDER — ATORVASTATIN CALCIUM 20 MG/1
20 TABLET, FILM COATED ORAL DAILY
Qty: 90 TABLET | Refills: 0 | OUTPATIENT
Start: 2024-01-18

## 2024-01-23 ENCOUNTER — OFFICE VISIT (OUTPATIENT)
Dept: OPHTHALMOLOGY | Facility: CLINIC | Age: 44
End: 2024-01-23
Attending: OPHTHALMOLOGY
Payer: COMMERCIAL

## 2024-01-23 DIAGNOSIS — H30.23 INTERMEDIATE UVEITIS OF BOTH EYES: Primary | ICD-10-CM

## 2024-01-23 DIAGNOSIS — Z79.899 HIGH RISK MEDICATION USE: ICD-10-CM

## 2024-01-23 DIAGNOSIS — G35 MULTIPLE SCLEROSIS (H): ICD-10-CM

## 2024-01-23 DIAGNOSIS — Z98.83 STATUS POST GLAUCOMA SURGERY: ICD-10-CM

## 2024-01-23 DIAGNOSIS — Z98.890 POSTOPERATIVE EYE STATE: Primary | ICD-10-CM

## 2024-01-23 DIAGNOSIS — H44.40 HYPOTONY, LEFT EYE: ICD-10-CM

## 2024-01-23 PROCEDURE — 99213 OFFICE O/P EST LOW 20 MIN: CPT | Mod: 24 | Performed by: OPHTHALMOLOGY

## 2024-01-23 PROCEDURE — 92081 LIMITED VISUAL FIELD XM: CPT | Performed by: OPHTHALMOLOGY

## 2024-01-23 PROCEDURE — 99212 OFFICE O/P EST SF 10 MIN: CPT | Performed by: OPHTHALMOLOGY

## 2024-01-23 PROCEDURE — 99024 POSTOP FOLLOW-UP VISIT: CPT | Mod: GC | Performed by: OPHTHALMOLOGY

## 2024-01-23 PROCEDURE — 99211 OFF/OP EST MAY X REQ PHY/QHP: CPT | Mod: 27 | Performed by: OPHTHALMOLOGY

## 2024-01-23 ASSESSMENT — TONOMETRY
OD_IOP_MMHG: 19
IOP_METHOD: APPLANATION
OD_IOP_MMHG: 16
IOP_METHOD: TONOPEN
OD_IOP_MMHG: 19
OS_IOP_MMHG: 22
OS_IOP_MMHG: 22
OS_IOP_MMHG: 18
IOP_METHOD: TONOPEN

## 2024-01-23 ASSESSMENT — REFRACTION_WEARINGRX
OS_ADD: +1.75
OD_ADD: +1.75
OS_SPHERE: -3.25
OS_AXIS: 095
OS_SPHERE: -3.25
OS_CYLINDER: +5.50
SPECS_TYPE: PAL
OD_AXIS: 055
OD_ADD: +1.75
SPECS_TYPE: PAL
OD_SPHERE: -4.50
OS_ADD: +1.75
OD_CYLINDER: +0.25
OD_CYLINDER: +0.25
OS_CYLINDER: +5.50
OD_SPHERE: -4.50
OD_AXIS: 055
OS_AXIS: 095

## 2024-01-23 ASSESSMENT — REFRACTION_MANIFEST
OD_ADD: +1.75
OS_CYLINDER: +5.50
OD_CYLINDER: SPHERE
OS_ADD: +1.75
OD_SPHERE: -4.50
OS_AXIS: 095
OS_SPHERE: -3.50

## 2024-01-23 ASSESSMENT — SLIT LAMP EXAM - LIDS
COMMENTS: NORMAL
COMMENTS: NORMAL

## 2024-01-23 ASSESSMENT — EXTERNAL EXAM - LEFT EYE
OS_EXAM: NORMAL
OS_EXAM: NORMAL

## 2024-01-23 ASSESSMENT — CUP TO DISC RATIO
OS_RATIO: 0.3
OD_RATIO: 0.4
OD_RATIO: 0.4
OS_RATIO: 0.4

## 2024-01-23 ASSESSMENT — VISUAL ACUITY
OD_PH_CC: 20/50
METHOD: SNELLEN - LINEAR
OS_CC+: -2
OD_PH_CC: 20/50
OD_CC: 20/50
METHOD: SNELLEN - LINEAR
OD_PH_CC+: +2
OD_CC+: -2
OS_CC: 20/70
CORRECTION_TYPE: GLASSES
OS_CC: 20/70
CORRECTION_TYPE: GLASSES
OD_PH_CC+: +2
OS_CC+: -2
OD_CC+: -2
OD_CC: 20/50

## 2024-01-23 ASSESSMENT — EXTERNAL EXAM - RIGHT EYE
OD_EXAM: NORMAL
OD_EXAM: NORMAL

## 2024-01-23 NOTE — LETTER
1/23/2024       RE: Alejandra Forbes  [address]  [place] [address]     Dear Colleague,    Thank you for referring your patient, Alejandra Forbes, to the [facility] [place] at [facility]. Please see a copy of my visit note below.    Chief Complaint/Presenting Concern: Uveitis follow-up    Interval History of Present Ocular Illness:  Alejandra Forbes is a 43 year old patient who returns for follow up of her intermediate uveitis and cystoid macular edema.  We last saw each other in January 5, 2024 after the bleb revision on the left eye which was doing well.  OCT scan showed improving macular edema and choroidal folds.  We supported the use of continued Copaxone, tapering oral prednisone, modifying drops per Dr. Sharma.    April saw Dr. Sharma today who recommended adding Losartan to help reduce risk of scarring and also increased steroid drop frequency.     Interval Updates to Medical/Family/Social History:    Blood sugar has improved and hoping to get off this temporary use of insulin    Relevant Review of Systems Updates:  Fatigue has been worse as prednisone has been tapered. The Keppra seems to be working to help nerve impulses but wondering about fatigue      Current eye related medications: Copaxone 3 times a week, prednisone 5 mg day (last 4 days),  prednisolone 4 times a day left eye--will plan to go up to 6x/day    Retina/Uveitis Imaging:     [facility] VISUAL FIELD  January 23, 2024   Right eye: Superior 45 degrees, inferior 60 degrees, nasal 55 degrees, temporal 85 degrees  Left eye:  Superior 35 degrees, inferior 45 degrees, nasal 55 degrees, temporal 70 degrees      Assessment:     1. Intermediate uveitis of both eyes  Right eye inactive, left eye improving after recent glaucoma surgery    2. Status post glaucoma surgery - Left Eye  Doing well after bleb revision with slight elevation of IOP to 18 today    3. Multiple sclerosis  (H)  On Copaxone 3x/week    4. High risk medication use  Copaxone and prednisone 5 mg daily    Plan/Recommendations:    Discussed findings with patient. The right eye is doing without active inflammation. The left eye is improving after recent bleb revision and we can taper off the oral prednisone as Copaxone is being continued  Eye pressure was 18 in the left eye with Dr. Shrama  Recommend additional testing: None needed  Continue prednisone 5 mg daily through Saturday, 1/27/24, then stop. Let me know if you feel very sluggish after this and we might try a low dose prednisone for a little longer  Continue Copaxone 3x/week with Dr. Sheffield. We will defer to Dr. Sheffield regarding Keppra   Please increase the prednisolone up to 6x/day left eye   Dr. Sharma would like to start Losartan drop 6x/day left eye and this seems reasonable to help reduce risk of scarring.   No drops right eye   We filled out DMV forms today.     RTC 4 weeks same day as Dr. Sharma dilate left eye and OCT (ordered)    Physician Attestation    Attending Physician Attestation:  Complete documentation of historical and exam elements from today's encounter can be found in the full encounter summary report (not reduplicated in this progress note). I personally obtained the chief complaint(s) and history of present illness. I confirmed and edited as necessary the review of systems, past medical/surgical history, family history, social history, and examination findings as documented by others; and I examined the patient myself. I personally reviewed the relevant tests, images, and reports as documented above. I formulated and edited as necessary the assessment and plan and discussed the findings and management plan with the patient and family members present at the time of this visit.  Sameer Kc M.D., Uveitis and Medical Retina, January 23, 2024     Again, thank you for allowing me to participate in the care of your patient.       Sincerely,    Sameer Kc MD  [facility] Dept of Ophthalmology  Uveitis and Medical Retina

## 2024-01-23 NOTE — NURSING NOTE
Chief Complaints and History of Present Illnesses   Patient presents with    Follow Up     Intermediate uveitis of both eyes    Uveitis Follow-Up     Chief Complaint(s) and History of Present Illness(es)       Follow Up              Comments: Intermediate uveitis of both eyes              Uveitis Follow-Up              Laterality: both eyes    Onset: gradual    Onset: months ago    Severity: moderate    Frequency: intermittently              Comments    Here for Intermediate uveitis of both eyes   Pt states some discomfort around the left eye in the morning   Also states some watering and mattering in the morning   No eye pain     Preethi Deleon COT 9:01 AM January 23, 2024

## 2024-01-23 NOTE — NURSING NOTE
Chief Complaints and History of Present Illnesses   Patient presents with    Post Op (Ophthalmology) Left Eye     REVISION, FILTERING BLEB LEFT  12/27/23     Chief Complaint(s) and History of Present Illness(es)       Post Op (Ophthalmology) Left Eye              Comments: REVISION, FILTERING BLEB LEFT  12/27/23              Comments    4 week PO   Pt states some discomfort around the left eye in the morning   Also states some watering and mattering in the morning  No eye pain    Preethi Deleon COT 9:01 AM January 23, 2024

## 2024-01-23 NOTE — PROGRESS NOTES
Chief Complaint/Presenting Concern: Uveitis follow-up    Interval History of Present Ocular Illness:  Alejandra Forbes is a 43 year old patient who returns for follow up of her intermediate uveitis and cystoid macular edema.  We last saw each other in January 5, 2024 after the bleb revision on the left eye which was doing well.  OCT scan showed improving macular edema and choroidal folds.  We supported the use of continued Copaxone, tapering oral prednisone, modifying drops per Dr. Sharma.    April saw Dr. Sharma today who recommended adding Losartan to help reduce risk of scarring and also increased steroid drop frequency.     Interval Updates to Medical/Family/Social History:    Blood sugar has improved and hoping to get off this temporary use of insulin    Relevant Review of Systems Updates:  Fatigue has been worse as prednisone has been tapered. The Keppra seems to be working to help nerve impulses but wondering about fatigue      Current eye related medications: Copaxone 3 times a week, prednisone 5 mg day (last 4 days),  prednisolone 4 times a day left eye--will plan to go up to 6x/day    Retina/Uveitis Imaging:     [facility] VISUAL FIELD  January 23, 2024   Right eye: Superior 45 degrees, inferior 60 degrees, nasal 55 degrees, temporal 85 degrees  Left eye:  Superior 35 degrees, inferior 45 degrees, nasal 55 degrees, temporal 70 degrees    Assessment:     1. Intermediate uveitis of both eyes  Right eye inactive, left eye improving after recent glaucoma surgery    2. Status post glaucoma surgery - Left Eye  Doing well after bleb revision with slight elevation of IOP to 18 today    3. Multiple sclerosis (H)  On Copaxone 3x/week    4. High risk medication use  Copaxone and prednisone 5 mg daily    Plan/Recommendations:    Discussed findings with patient. The right eye is doing without active inflammation. The left eye is improving after recent bleb revision and we can taper off the oral prednisone as Copaxone is  being continued  Eye pressure was 18 in the left eye with Dr. Sharma  Recommend additional testing: None needed  Continue prednisone 5 mg daily through Saturday, 1/27/24, then stop. Let me know if you feel very sluggish after this and we might try a low dose prednisone for a little longer  Continue Copaxone 3x/week with Dr. Sheffield. We will defer to Dr. Sheffield regarding Keppra   Please increase the prednisolone up to 6x/day left eye   Dr. Sharma would like to start Losartan drop 6x/day left eye and this seems reasonable to help reduce risk of scarring.   No drops right eye   We filled out DMV forms today.     RTC 4 weeks same day as Dr. Sharma dilate left eye and OCT (ordered)    Physician Attestation     Attending Physician Attestation:  Complete documentation of historical and exam elements from today's encounter can be found in the full encounter summary report (not reduplicated in this progress note). I personally obtained the chief complaint(s) and history of present illness. I confirmed and edited as necessary the review of systems, past medical/surgical history, family history, social history, and examination findings as documented by others; and I examined the patient myself. I personally reviewed the relevant tests, images, and reports as documented above. I formulated and edited as necessary the assessment and plan and discussed the findings and management plan with the patient and family members present at the time of this visit.  Sameer Kc M.D., Uveitis and Medical Retina, January 23, 2024

## 2024-01-23 NOTE — PATIENT INSTRUCTIONS
Continue prednisone 5 mg daily through Saturday, 1/27/24, then stop. Let me know if you feel very sluggish after this and we might try a low dose prednisone for a little longer  Continue Copaxone 3x/week with Dr. Sheffield. We will defer to Dr. Sheffield regarding Keppra   Please increase the prednisolone up to 6x/day left eye   Dr. Sharma would like to start Losartan drop 6x/day left eye and this seems reasonable to help reduce risk of scarring.   No drops right eye

## 2024-01-23 NOTE — PATIENT INSTRUCTIONS
Increase Prednisolone to 1 drop every 2 hours in the left eye   Start Losartan 1 drop 6 times daily in the left eye   Currently on oral Prednisone 5 mg daily (seeing Dr. Kc today)

## 2024-01-23 NOTE — PROGRESS NOTES
Chief Complaint/Presenting Concern: Bleb Leak follow up, post operative visit     History of Present Illness:   Alejandra Forbes is a 43 year old patient who presents for evaluation of glaucoma. She has previously followed with Dr. Edward. S/p trab each eye in 2006. She was seen yesterday in the uveitis clinic and exam was notable for IOP of 6 in the left eye and a subtle leak of the trab bleb in the left eye. OCT macula was significant for new choroidal folds in the left eye. Moxifloxacin was started q2h in the left eye and contact lens was placed.     Today 1/23/24 patient presents for POM1 bleb revision left eye. She had some left eye discomfort and watering in the morning that has been improving. Patient feels like her vision is better when she's looking down compared to straight ahead.    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 Edward)  Left eye bleb revision 12/27/23  Maximum intraocular pressure > 40 both eyes  Currently Meds: none  Family history: positive - grandmother  Trauma history: negative  Steroid exposure: negative  Meds AEs/intolerance: none  PMHx: has very mild asthma  Anticoagulants: none   Previous testing:  OCT Optic Nerve RNFL Spectralis December 5, 2023  right eye: normal thickness, no change since 2015  left eye: increased thickness/edema compared to 2015    Today's testing:  IOP 18 mmHg by applanation left eye  Conj well approximated with no leak superiorly, AC well formed with minimal inflammation     Additional Ocular History:   2. 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  Previously treated with intraocular and periocular steroid injections    3. Cataract surgery with lens implant left eye s/p yag  capsulotomy    4. ERM left eye> right eye     5. Bleb leak left eye   Noted by Dr. Kc, started on topical Vigamox antibiotics   S/p bleb revision 12/27/23    Plan/Recommendations:  Discussed findings with patient. 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 Edward. Has not been on IOP lowering drops. OCT appears stable since 2015 with slightly worsening disc edema in the left eye. There was a pinpoint bleb leak left eye. Today POM1 s/p bleb revision, doing well, no leak.  Increase Prednisolone to 1 drop every 2 hours in the left eye, IOP is increased today the bleb may be starting to scar down   Start Losartan eye drop  1 drop 6 times daily in the left eye to help prevent scarring of the bleb. Discussed with the patient that Losartan is FDA approved for treatment of high BP but not for the treatment of eye diseases. Discussed with the patient the risk and benefit of Losartan eye drop use, patient expressed understands and agrees to proceed with it's use.   Currently on oral Prednisone 5 mg daily (seeing Dr. Kc today)  Continue eye shield at bedtime and no eye rubbing  Postoperative precautions discussed    RTC in 4 weeks for VA, IOP    Physician Attestation     Carlee Rodriguez MD  PGY3 Ophthalmology Resident  [facility]      Physician Attestation     Attending Physician Attestation:  Complete documentation of historical and exam elements from today's encounter can be found in the full encounter summary report (not reduplicated in this progress note). I personally obtained the chief complaint(s) and history of present illness. I confirmed and edited as necessary the review of systems, past medical/surgical history, family history, social history, and examination findings as documented by others; and I examined the patient myself. I personally reviewed the relevant tests, images, and reports as documented above. I formulated and  edited as necessary the assessment and plan and discussed the findings and management plan with the patient and any family members present at the time of the visit.  Deonte Sharma M.D., Glaucoma, January 23, 2024

## 2024-01-29 DIAGNOSIS — E11.65 TYPE 2 DIABETES MELLITUS WITH HYPERGLYCEMIA, WITHOUT LONG-TERM CURRENT USE OF INSULIN (H): ICD-10-CM

## 2024-01-29 RX ORDER — DULAGLUTIDE 0.75 MG/.5ML
0.75 INJECTION, SOLUTION SUBCUTANEOUS
Qty: 2 ML | Refills: 0 | Status: SHIPPED | OUTPATIENT
Start: 2024-01-29 | End: 2024-06-06 | Stop reason: ALTCHOICE

## 2024-02-10 DIAGNOSIS — H59.89 LEAKING OF CONJUNCTIVAL DRAINAGE BLEB: ICD-10-CM

## 2024-02-10 DIAGNOSIS — T81.31XA LEAKING OF CONJUNCTIVAL DRAINAGE BLEB: ICD-10-CM

## 2024-02-12 ENCOUNTER — MYC MEDICAL ADVICE (OUTPATIENT)
Dept: OPHTHALMOLOGY | Facility: CLINIC | Age: 44
End: 2024-02-12
Payer: COMMERCIAL

## 2024-02-12 DIAGNOSIS — T81.31XA LEAKING OF CONJUNCTIVAL DRAINAGE BLEB: ICD-10-CM

## 2024-02-12 DIAGNOSIS — H59.89 LEAKING OF CONJUNCTIVAL DRAINAGE BLEB: ICD-10-CM

## 2024-02-13 RX ORDER — PREDNISOLONE ACETATE 10 MG/ML
1-2 SUSPENSION/ DROPS OPHTHALMIC
Qty: 10 ML | Refills: 1 | Status: SHIPPED | OUTPATIENT
Start: 2024-02-13

## 2024-02-13 NOTE — TELEPHONE ENCOUNTER
Show:  [x]Written[]Templated[]Copied    Added by:  [x]Chhaya Mcleod, LUIS    [x]Shay for details  Okay to refill.  Thank you       prednisoLONE acetate (PRED FORTE) 1 % ophthalmic suspension      Place 1-2 drops Into the left eye 6 times daily - Left Eye    Last Written Prescription Date:  1/2/24  Last Fill Quantity: 10 ml ,   # refills: 1  [facility] [address] [place], [place] - [address]     Last Office Visit : 1/23/24  Plan/Recommendations:    Discussed findings with patient. The right eye is doing without active inflammation. The left eye is improving after recent bleb revision and we can taper off the oral prednisone as Copaxone is being continued  Eye pressure was 18 in the left eye with Dr. Sharma  Recommend additional testing: None needed  Continue prednisone 5 mg daily through Saturday, 1/27/24, then stop. Let me know if you feel very sluggish after this and we might try a low dose prednisone for a little longer  Continue Copaxone 3x/week with Dr. Sheffield. We will defer to Dr. Sheffield regarding Keppra   Please increase the prednisolone up to 6x/day left eye   Dr. Sharma would like to start Losartan drop 6x/day left eye and this seems reasonable to help reduce risk of scarring.   No drops right eye   We filled out DMV forms today.   Future Office visit:  2/20/24 Sheheitli and Yamanuha

## 2024-02-19 DIAGNOSIS — E11.65 TYPE 2 DIABETES MELLITUS WITH HYPERGLYCEMIA, WITHOUT LONG-TERM CURRENT USE OF INSULIN (H): ICD-10-CM

## 2024-02-19 RX ORDER — DULAGLUTIDE 1.5 MG/.5ML
1.5 INJECTION, SOLUTION SUBCUTANEOUS
Qty: 2 ML | Refills: 0 | Status: SHIPPED | OUTPATIENT
Start: 2024-02-19 | End: 2024-03-19

## 2024-02-20 ENCOUNTER — OFFICE VISIT (OUTPATIENT)
Dept: OPHTHALMOLOGY | Facility: CLINIC | Age: 44
End: 2024-02-20
Attending: OPHTHALMOLOGY
Payer: COMMERCIAL

## 2024-02-20 DIAGNOSIS — H35.353 CYSTOID MACULAR EDEMA OF BOTH EYES: ICD-10-CM

## 2024-02-20 DIAGNOSIS — Z98.890 POSTOPERATIVE EYE STATE: ICD-10-CM

## 2024-02-20 DIAGNOSIS — Z79.899 HIGH RISK MEDICATION USE: ICD-10-CM

## 2024-02-20 DIAGNOSIS — H30.23 INTERMEDIATE UVEITIS OF BOTH EYES: Primary | ICD-10-CM

## 2024-02-20 DIAGNOSIS — Z98.83 STATUS POST GLAUCOMA SURGERY: ICD-10-CM

## 2024-02-20 PROCEDURE — 99211 OFF/OP EST MAY X REQ PHY/QHP: CPT | Mod: 25 | Performed by: OPHTHALMOLOGY

## 2024-02-20 PROCEDURE — 92134 CPTRZ OPH DX IMG PST SGM RTA: CPT | Performed by: OPHTHALMOLOGY

## 2024-02-20 PROCEDURE — 99213 OFFICE O/P EST LOW 20 MIN: CPT | Mod: 24 | Performed by: OPHTHALMOLOGY

## 2024-02-20 PROCEDURE — 99211 OFF/OP EST MAY X REQ PHY/QHP: CPT | Mod: 27 | Performed by: OPHTHALMOLOGY

## 2024-02-20 PROCEDURE — 99024 POSTOP FOLLOW-UP VISIT: CPT | Mod: GC | Performed by: OPHTHALMOLOGY

## 2024-02-20 ASSESSMENT — TONOMETRY
OD_IOP_MMHG: 16
OS_IOP_MMHG: 19
IOP_METHOD: TONOPEN
OD_IOP_MMHG: 16
OS_IOP_MMHG: 19
IOP_METHOD: TONOPEN
IOP_METHOD: APPLANATION
OS_IOP_MMHG: 17
OD_IOP_MMHG: 15

## 2024-02-20 ASSESSMENT — CONF VISUAL FIELD
METHOD: COUNTING FINGERS
OS_INFERIOR_NASAL_RESTRICTION: 0
OD_INFERIOR_TEMPORAL_RESTRICTION: 0
OD_INFERIOR_NASAL_RESTRICTION: 0
OD_INFERIOR_NASAL_RESTRICTION: 0
OD_NORMAL: 1
OD_INFERIOR_TEMPORAL_RESTRICTION: 0
OS_NORMAL: 1
OS_SUPERIOR_TEMPORAL_RESTRICTION: 0
OD_SUPERIOR_TEMPORAL_RESTRICTION: 0
OS_INFERIOR_NASAL_RESTRICTION: 0
OS_SUPERIOR_TEMPORAL_RESTRICTION: 0
OD_SUPERIOR_NASAL_RESTRICTION: 0
OD_NORMAL: 1
METHOD: COUNTING FINGERS
OS_INFERIOR_TEMPORAL_RESTRICTION: 0
OS_INFERIOR_TEMPORAL_RESTRICTION: 0
OS_SUPERIOR_NASAL_RESTRICTION: 0
OS_NORMAL: 1
OS_SUPERIOR_NASAL_RESTRICTION: 0
OD_SUPERIOR_NASAL_RESTRICTION: 0
OD_SUPERIOR_TEMPORAL_RESTRICTION: 0

## 2024-02-20 ASSESSMENT — REFRACTION_WEARINGRX
OS_ADD: +1.75
OS_CYLINDER: +5.50
OS_SPHERE: -3.25
OD_AXIS: 055
OS_SPHERE: -3.25
OS_AXIS: 095
OD_CYLINDER: +0.25
OS_ADD: +1.75
OD_SPHERE: -4.50
SPECS_TYPE: PAL
OD_SPHERE: -4.50
SPECS_TYPE: PAL
OD_ADD: +1.75
OD_CYLINDER: +0.25
OS_CYLINDER: +5.50
OD_ADD: +1.75
OS_AXIS: 095
OD_AXIS: 055

## 2024-02-20 ASSESSMENT — SLIT LAMP EXAM - LIDS
COMMENTS: NORMAL
COMMENTS: NORMAL

## 2024-02-20 ASSESSMENT — EXTERNAL EXAM - RIGHT EYE
OD_EXAM: NORMAL
OD_EXAM: NORMAL

## 2024-02-20 ASSESSMENT — VISUAL ACUITY
OD_CC: 20/40
OS_CC+: -1
CORRECTION_TYPE: GLASSES
METHOD: SNELLEN - LINEAR
OD_CC+: -1
CORRECTION_TYPE: GLASSES
OS_PH_CC: 20/70
OS_CC: 20/80
OD_CC: 20/40
METHOD: SNELLEN - LINEAR
OS_CC+: -1
OD_CC+: -1
OS_CC: 20/80

## 2024-02-20 ASSESSMENT — CUP TO DISC RATIO
OD_RATIO: 0.4
OS_RATIO: 0.4
OD_RATIO: 0.4
OS_RATIO: 0.4

## 2024-02-20 ASSESSMENT — EXTERNAL EXAM - LEFT EYE
OS_EXAM: NORMAL
OS_EXAM: NORMAL

## 2024-02-20 NOTE — NURSING NOTE
Chief Complaints and History of Present Illnesses   Patient presents with    Uveitis Follow-Up     Chief Complaint(s) and History of Present Illness(es)       Uveitis Follow-Up              Laterality: both eyes    Onset: gradual    Quality: Unsure if the va has changed      Severity: moderate    Frequency: intermittently    Associated symptoms: tearing (happened last week and has decreased), photophobia, flashes and floaters.  Negative for dryness, redness and burning    Treatments tried: eye drops    Pain scale: 0/10              Comments    Here for Intermediate uveitis of both eyes  States the discomfort has decreased   Prednisolone QID day left eye   Losartan QID day left eye   Prednisone last taken couple weeks ago.  Zabrina Melchor COT 7:41 AM February 20, 2024

## 2024-02-20 NOTE — PATIENT INSTRUCTIONS
Continue these medications all unchanged: prednisolone 4-6 times a day left eye, losartan 4-6 times a day left eye   For now, continue Copaxone 3x/week.

## 2024-02-20 NOTE — LETTER
"2/20/2024       RE: Alejandra Forbes  [address]  [place] [address]     Dear Colleague,    Thank you for referring your patient, Alejandra Forbes, to the [facility] [place] at [facility]. Please see a copy of my visit note below.    Chief Complaint/Presenting Concern: Uveitis follow-up    Interval History of Present Ocular Illness:  Alejandra Forbes is a 44 year old patient who returns for follow up of her intermediate uveitis of both eyes.  We last saw each other on January 23, 2024 at which time inflammation in the left eye was improving after the bleb revision surgery.  We recommended increasing prednisolone frequency in the left eye along with Dr. Sharma's plan to start losartan drops for the left eye.  We also supported the use of continued Copaxone while tapering oral prednisone.    April reports things are okay on drops and off prednisone. There is less discomfort in the left eye. Vision is still blurry looking straight ahead when looking out of the left eye but moving the eye clearly helps to see more clearly    Interval Updates to Medical/Family/Social History:    Took a bit to adjust off prednisone but doing okay. Still on Copaxone (nearly 2 months) and Keppra    Relevant Review of Systems Updates:  Recent \"surge of energy\" which went to back of scalp and and left eye watered subsequently.    Labs: None since last visit     Current eye related medications: Copaxone 3 times a week, no prednisone for 2-3 weeks, prednisolone 4-6 times a day left eye, losartan 4-6 times a day left eye    Retina/Uveitis Imaging:   OCT Spectralis Macula February 20, 2024  right eye: Improved media, ERM with thickening and small cyst. Minimal NFL thickening  left eye: ERM, no folds, few more cysts.     Assessment:     1. Intermediate uveitis of both eyes  Right eye: No AC Cell    Left eye: No AC Cell, fine haze.    2. Cystoid macular edema of both " "eyes  Small cyst right eye, few more cysts left eye     3. Status post glaucoma surgery - Left Eye  IOP 19 after bleb revision    4. High risk medication use  Copaxone 3 times a week, no off prednisone    Plan/Recommendations:    Discussed findings with patient. The left eye is doing well after the bleb revision without active anterior inflammation and few cysts by OCT. For now, we should continue Copaxone without prednisone and continue drops unchanged.   Eye pressure is 16,19  Recommend additional testing: None specifically  Continue these medications all unchanged: prednisolone 4-6 times a day left eye, losartan 4-6 times a day left eye (unless Dr. Sharma recommends anything different)  For now, continue Copaxone 3x/week.  No prednisone pills needed  Regarding the \"electric shocks/surges\" happening in spite of Copaxone and Keppra, we may ask Dr. Sheffield about perhaps an earlier visit than June if possible. There may also be some consideration of adding mycophenolate which can help uveitis and may/may not help some of the MS symptoms    RTC Dr. Sharma today. Yamini around 1 month okay same day. Dilate left eye with OCT (ordered)    Physician Attestation    Attending Physician Attestation:  Complete documentation of historical and exam elements from today's encounter can be found in the full encounter summary report (not reduplicated in this progress note). I personally obtained the chief complaint(s) and history of present illness. I confirmed and edited as necessary the review of systems, past medical/surgical history, family history, social history, and examination findings as documented by others; and I examined the patient myself. I personally reviewed the relevant tests, images, and reports as documented above. I formulated and edited as necessary the assessment and plan and discussed the findings and management plan with the patient and family members present at the time of this visit.  Sameer " KERRY Kc., Uveitis and Medical Retina, February 20, 2024     Again, thank you for allowing me to participate in the care of your patient.      Sincerely,    Sameer Kc MD  [facility] Dept of Ophthalmology  Uveitis and Medical Retina

## 2024-02-20 NOTE — PATIENT INSTRUCTIONS
"Continue these medications all unchanged: prednisolone 4-6 times a day left eye, losartan 4-6 times a day left eye (unless Dr. Sharma recommends anything different)  For now, continue Copaxone 3x/week.  No prednisone pills needed  Regarding the \"electric shocks/surges\" happening in spite of Copaxone and Keppra, we may ask Dr. Sheffield about perhaps an earlier visit than June if possible. There may also be some consideration of adding mycophenolate which can help uveitis and may/may not help some of the MS symptoms    "

## 2024-02-20 NOTE — PROGRESS NOTES
"Chief Complaint/Presenting Concern: Uveitis follow-up    Interval History of Present Ocular Illness:  Alejandra Forbes is a 44 year old patient who returns for follow up of her intermediate uveitis of both eyes.  We last saw each other on January 23, 2024 at which time inflammation in the left eye was improving after the bleb revision surgery.  We recommended increasing prednisolone frequency in the left eye along with Dr. Sharma's plan to start losartan drops for the left eye.  We also supported the use of continued Copaxone while tapering oral prednisone.    April reports things are okay on drops and off prednisone. There is less discomfort in the left eye. Vision is still blurry looking straight ahead when looking out of the left eye but moving the eye clearly helps to see more clearly    Interval Updates to Medical/Family/Social History:    Took a bit to adjust off prednisone but doing okay. Still on Copaxone (nearly 2 months) and Keppra    Relevant Review of Systems Updates:  Recent \"surge of energy\" which went to back of scalp and and left eye watered subsequently.    Labs: None since last visit     Current eye related medications: Copaxone 3 times a week, no prednisone for 2-3 weeks, prednisolone 4-6 times a day left eye, losartan 4-6 times a day left eye    Retina/Uveitis Imaging:   OCT Spectralis Macula February 20, 2024  right eye: Improved media, ERM with thickening and small cyst. Minimal NFL thickening  left eye: ERM, no folds, few more cysts.     Assessment:     1. Intermediate uveitis of both eyes  Right eye: No AC Cell    Left eye: No AC Cell, fine haze.    2. Cystoid macular edema of both eyes  Small cyst right eye, few more cysts left eye     3. Status post glaucoma surgery - Left Eye  IOP 19 after bleb revision    4. High risk medication use  Copaxone 3 times a week, no off prednisone    Plan/Recommendations:    Discussed findings with patient. The left eye is doing well after the bleb revision " "without active anterior inflammation and few cysts by OCT. For now, we should continue Copaxone without prednisone and continue drops unchanged.   Eye pressure is 16,19  Recommend additional testing: None specifically  Continue these medications all unchanged: prednisolone 4-6 times a day left eye, losartan 4-6 times a day left eye (unless Dr. Sharma recommends anything different)  For now, continue Copaxone 3x/week.  No prednisone pills needed  Regarding the \"electric shocks/surges\" happening in spite of Copaxone and Keppra, we may ask Dr. Sheffield about perhaps an earlier visit than June if possible. There may also be some consideration of adding mycophenolate which can help uveitis and may/may not help some of the MS symptoms    RTC Dr. Sharma today. Yamini around 1 month okay same day. Dilate left eye with OCT (ordered)    Physician Attestation     Attending Physician Attestation:  Complete documentation of historical and exam elements from today's encounter can be found in the full encounter summary report (not reduplicated in this progress note). I personally obtained the chief complaint(s) and history of present illness. I confirmed and edited as necessary the review of systems, past medical/surgical history, family history, social history, and examination findings as documented by others; and I examined the patient myself. I personally reviewed the relevant tests, images, and reports as documented above. I formulated and edited as necessary the assessment and plan and discussed the findings and management plan with the patient and family members present at the time of this visit.  Sameer Kc M.D., Uveitis and Medical Retina, February 20, 2024     "

## 2024-02-20 NOTE — PROGRESS NOTES
Chief Complaint/Presenting Concern: Bleb Leak follow up, post operative visit     History of Present Illness:   Alejandra Forbes is a 43 year old patient who presents for evaluation of glaucoma. She has previously followed with Dr. Edward. S/p trab each eye in 2006. She was found to have a subtle leak of the trab bleb in the left eye with low IOP and choroidal folds. She underwent bleb revision 12/27/23.    She saw Dr. Kc today, no changes to management at this time. Today she reports less discomfort left eye. No pain at this time but she did have shocks in left eye last week.     Very consistently able to get four times a day of losartan and prednisolone in the left eye.    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 Edward)  Left eye bleb revision secondary to bleb leak 12/27/23  Maximum intraocular pressure > 40 both eyes  Currently Meds: see below  Family history: positive - grandmother  Trauma history: negative  Steroid exposure: negative  Meds AEs/intolerance: none  PMHx: has very mild asthma  Anticoagulants: none   Previous testing:  OCT Optic Nerve RNFL Spectralis December 5, 2023  right eye: normal thickness, no change since 2015  left eye: increased thickness/edema compared to 2015    Today's testing:  IOP 17 mmHg by applanation left eye  Conj well approximated with no leak superiorly, AC well formed with minimal inflammation     Additional Ocular History:   Previously cystoid macular edema of each eye.  Previously treated with intraocular and periocular steroid injections  Cataract surgery with lens implant left eye s/p yag capsulotomy  ERM left eye> right eye   Bleb leak left eye   Noted by Dr. Kc, started on topical Vigamox  antibiotics   S/p bleb revision 12/27/23    Plan/Recommendations:  Discussed findings with patient. 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 Edward. Has not been on IOP lowering drops. OCT appears stable since 2015 with slightly worsening disc edema in the left eye. There was a pinpoint bleb leak left eye. Today POM1 s/p bleb revision 12/27/23, doing well, no leak.  Continue Prednisolone four times daily left eye   Continue Losartan eye drop 1 drop 6 times daily in the left eye to help prevent scarring of the bleb.   Off of prednisone  Continue eye shield at bedtime and no eye rubbing  Postoperative precautions discussed    RTC in 2 months VA, IOP     Thank you for entrusting us with your care  Sara Dee MD, PGY3  Ophthalmology Resident  [facility]      Physician Attestation     Attending Physician Attestation:  Complete documentation of historical and exam elements from today's encounter can be found in the full encounter summary report (not reduplicated in this progress note). I personally obtained the chief complaint(s) and history of present illness. I confirmed and edited as necessary the review of systems, past medical/surgical history, family history, social history, and examination findings as documented by others; and I examined the patient myself. I personally reviewed the relevant tests, images, and reports as documented above. I formulated and edited as necessary the assessment and plan and discussed the findings and management plan with the patient and any family members present at the time of the visit.  Deonte Sharma M.D., Glaucoma, February 20, 2024

## 2024-02-20 NOTE — NURSING NOTE
Chief Complaints and History of Present Illnesses   Patient presents with    Post Op (Ophthalmology) Left Eye     Chief Complaint(s) and History of Present Illness(es)       Post Op (Ophthalmology) Left Eye              Laterality: left eye    Onset: months ago    Quality: Unsure if the va has changed                Comments    S/P Left eye bleb revision 12/27/23  Less discomfort   Prednisolone QID day left eye   Losartan QID day left eye   Prednisone last taken couple weeks ago.  Zabrina Melchor COT 7:41 AM February 20, 2024

## 2024-03-13 ENCOUNTER — MYC MEDICAL ADVICE (OUTPATIENT)
Dept: NEUROLOGY | Facility: CLINIC | Age: 44
End: 2024-03-13
Payer: COMMERCIAL

## 2024-03-13 DIAGNOSIS — G35 MULTIPLE SCLEROSIS (H): ICD-10-CM

## 2024-03-19 RX ORDER — LEVETIRACETAM 1000 MG/1
1500 TABLET ORAL 2 TIMES DAILY
Qty: 270 TABLET | Refills: 3 | Status: SHIPPED | OUTPATIENT
Start: 2024-03-19 | End: 2024-06-11

## 2024-04-15 ENCOUNTER — OFFICE VISIT (OUTPATIENT)
Dept: OPHTHALMOLOGY | Facility: CLINIC | Age: 44
End: 2024-04-15
Attending: OPHTHALMOLOGY
Payer: COMMERCIAL

## 2024-04-15 DIAGNOSIS — Z98.83 STATUS POST GLAUCOMA SURGERY: ICD-10-CM

## 2024-04-15 DIAGNOSIS — Z79.899 HIGH RISK MEDICATION USE: ICD-10-CM

## 2024-04-15 DIAGNOSIS — H30.23 INTERMEDIATE UVEITIS OF BOTH EYES: Primary | ICD-10-CM

## 2024-04-15 DIAGNOSIS — H35.353 CYSTOID MACULAR EDEMA OF BOTH EYES: ICD-10-CM

## 2024-04-15 DIAGNOSIS — G35 MULTIPLE SCLEROSIS (H): ICD-10-CM

## 2024-04-15 PROCEDURE — 99213 OFFICE O/P EST LOW 20 MIN: CPT | Performed by: OPHTHALMOLOGY

## 2024-04-15 PROCEDURE — 99214 OFFICE O/P EST MOD 30 MIN: CPT | Performed by: OPHTHALMOLOGY

## 2024-04-15 PROCEDURE — 92134 CPTRZ OPH DX IMG PST SGM RTA: CPT | Performed by: OPHTHALMOLOGY

## 2024-04-15 ASSESSMENT — REFRACTION_WEARINGRX
OS_SPHERE: -3.50
OD_CYLINDER: SPHERE
OD_ADD: +1.75
OS_ADD: +1.75
OS_CYLINDER: +5.50
OS_AXIS: 095
OD_SPHERE: -4.50

## 2024-04-15 ASSESSMENT — VISUAL ACUITY
OD_CC+: -2
OS_CC+: -1
OD_CC: 20/50
OS_CC: 20/100
METHOD: SNELLEN - LINEAR
CORRECTION_TYPE: GLASSES

## 2024-04-15 ASSESSMENT — CUP TO DISC RATIO
OS_RATIO: 0.4
OD_RATIO: 0.4

## 2024-04-15 ASSESSMENT — CONF VISUAL FIELD
OD_SUPERIOR_NASAL_RESTRICTION: 0
OD_SUPERIOR_TEMPORAL_RESTRICTION: 0
OS_SUPERIOR_TEMPORAL_RESTRICTION: 0
OS_INFERIOR_TEMPORAL_RESTRICTION: 0
OD_INFERIOR_TEMPORAL_RESTRICTION: 0
OS_INFERIOR_NASAL_RESTRICTION: 0
OD_INFERIOR_NASAL_RESTRICTION: 0
OS_SUPERIOR_NASAL_RESTRICTION: 0

## 2024-04-15 ASSESSMENT — EXTERNAL EXAM - LEFT EYE: OS_EXAM: NORMAL

## 2024-04-15 ASSESSMENT — SLIT LAMP EXAM - LIDS: COMMENTS: NORMAL

## 2024-04-15 ASSESSMENT — TONOMETRY
OS_IOP_MMHG: 18
OD_IOP_MMHG: 19
IOP_METHOD: APPLANATION

## 2024-04-15 ASSESSMENT — EXTERNAL EXAM - RIGHT EYE: OD_EXAM: NORMAL

## 2024-04-15 NOTE — Clinical Note
Jabari Clemons: April wished to push out the visits a few weeks. Okay to wait on or should she restart Losartan? Thank you!

## 2024-04-15 NOTE — PATIENT INSTRUCTIONS
Recommend additional testing: None at this time. We look forward to the MRI of the brain and Dr. Sheffield's visit in June 2024  Continue these medications: Copaxone 3x/week and Keppra 3000 mg daily  For the steroid drop in the left eye, let's reduce to just once a day. No steroid drops right eye   For each eye, let's add some lubricating drops such as Refresh or Systane 2-3x/day in each eye to help with dryness   We will ask Dr. Sharma about restart losartan 4-6x/day left eye   No oral prednisone needed.

## 2024-04-15 NOTE — PROGRESS NOTES
Chief Complaint/Presenting Concern:  Uveitis follow up    Interval History of Present Ocular Illness:  Alejandra Forbes is a 44 year old patient who returns for follow up of her intermediate uveitis of both eyes.  We last saw each other on February 20, 2024 at which time the left eye was doing well after the bleb revision without active anterior chamber inflammation and only minimal changes by OCT scans.  We recommended continuing Copaxone and drops without any additional oral prednisone.    Since then, April reports intermittent increased blurriness in her vision. She reports having an increased dose of Keppra (3,000 mg) and April notes decreased vision since then. No double vision, no changes in color vision but there may be some cerebral perception of colors in the brain. These have not increased with Keppra.No new flashes or floaters.    Interval Updates to Medical/Family/Social History:    Increased Keppra dose to 3,000 mg about 1 month ago but there has been improvement in other systemic Neurologic symptoms (electrical impulses) back of head and both sides of face.    Relevant Review of Systems Updates:    As above. There is some twitching in the big toe of both feet.   Still fatigued also possibly from higher dose of Keppra and also some mood fluctuations     Labs: None recently relevant     Current eye related medications: Copaxone 3 times a week, prednisolone 2-4  times a day left eye, ran out of losartan 2 weeks ago    Retina/Uveitis Imaging:   OCT Spectralis Macula April 15, 2024  right eye: clear media, ERM with irregular inner retinal contour, minimal cyst near fovea stable, mild NFL thickening which is stable  left eye: clear media, ERM, thickening with some persistent subtle cysts  minimal increased NFL thickening but no PP fluid    Assessment:     1. Intermediate uveitis of both eyes  No active inflammation in the front nor back of either eye    2. Cystoid macular edema of both eyes  Minimal cyst right  eye, few small cysts left eye stable since Feb 2024.     3. Status post glaucoma surgery - Left Eye  IOP 18 today after bleb revision procedure.    4. Multiple sclerosis (H)  Copaxone 3 times per week, no prednisone.    5. High risk medication use  Copaxone 3x/week    Plan/Recommendations:    Discussed findings with patient. There is no active inflammation in either eye and there are no new changes to the retinal scans of either eye. There are no signs of optic neuritis. There may be some dryness of the eyes from Keppra. We will modify steroid drops left eye and add lubricating drops  Eye pressure is 19,18. The left eye is doing well after the bleb rveision without aqctive anterior inflammation and April will see Dr. Sharma soon. No drops needed for pressure lowering  Recommend additional testing: None at this time. We look forward to the MRI of the brain and Dr. Sheffield's visit in June 2024  Continue these medications: Copaxone 3x/week and Keppra 3000 mg daily  For the steroid drop in the left eye, let's reduce to just once a day. No steroid drops right eye   For each eye, let's add some lubricating drops such as Refresh or Systane 2-3x/day in each eye to help with dryness   We will ask Dr. Sharma about restart losartan 4-6x/day left eye   No oral prednisone needed.    RTC    Dr. Sharma next week please move out 3-4 weeks with Yamini same day. Refract if intereted, applanate, no dilation, no testing for SHIRA Rodriguez, MS3    Attending Physician Attestation:  Complete documentation of historical and exam elements from today's encounter can be found in the full encounter summary report (not reduplicated in this progress note). I reviewed the chief complaint(s) and history of present illness, and  confirmed and edited as necessary the review of systems, past medical/surgical history, family history, social history, and examination findings as documented by others and the Medical Student    I  examined the patient myself, discussed the findings, reviewed all ancillary testing data and modified these results and reports along with the assessment and plan with the Medical Student. I agree with the note as detailed above.   Sameer Kc M.D.  Uveitis and Medical Retina  April 15, 2024

## 2024-04-15 NOTE — NURSING NOTE
Chief Complaints and History of Present Illnesses   Patient presents with    Uveitis Follow-Up     Intermediate uveitis of both eyes      Chief Complaint(s) and History of Present Illness(es)       Uveitis Follow-Up              Laterality: both eyes    Associated symptoms: dryness (intermittent), redness and photophobia.  Negative for eye pain    Treatments tried: eye drops    Pain scale: 0/10    Comments: Intermediate uveitis of both eyes               Comments    She states that she is having some intermittent blurred vision for the past month.  She associates the change with taking an increased dose of Keppra.    She is taking:  prednisolone acetate 2-4 times a day left eye  losartan -out of this drop for 2 weeks  copaxone 3x/week    WON Coleman 7:46 AM  April 15, 2024

## 2024-04-15 NOTE — LETTER
4/15/2024       RE: Alejandra Forbes  [address]  [place] [address]     Dear Colleague,    Thank you for referring your patient, Alejandra Forbes, to the [facility] [place] at [facility]. Please see a copy of my visit note below.    Chief Complaint/Presenting Concern:  Uveitis follow up    Interval History of Present Ocular Illness:  Alejandra Forbes is a 44 year old patient who returns for follow up of her intermediate uveitis of both eyes.  We last saw each other on February 20, 2024 at which time the left eye was doing well after the bleb revision without active anterior chamber inflammation and only minimal changes by OCT scans.  We recommended continuing Copaxone and drops without any additional oral Prednisone.    Since then, April reports intermittent increased blurriness in her vision. She reports having an increased dose of Keppra (3,000 mg) and April notes decreased vision since then. No double vision, no changes in color vision but there may be some cerebral perception of colors in the brain. These have not increased with Keppra.No new flashes or floaters.    Interval Updates to Medical/Family/Social History:    Increased Keppra dose to 3,000 mg about 1 month ago but there has been improvement in other systemic Neurologic symptoms (electrical impulses) back of head and both sides of face.    Relevant Review of Systems Updates:    As above. There is some twitching in the big toe of both feet.   Still fatigued also possibly from higher dose of Keppra and also some mood fluctuations     Labs: None recently relevant     Current eye related medications: Copaxone 3 times a week, prednisolone 2-4  times a day left eye, ran out of losartan 2 weeks ago      Retina/Uveitis Imaging:   OCT Spectralis Macula April 15, 2024  right eye: clear media, ERM with irregular inner retinal contour, minimal cyst near fovea stable, mild NFL thickening  which is stable  left eye: clear media, ERM, thickening with some persistent subtle cysts  minimal increased NFL thickening but no PP fluid    Assessment:     1. Intermediate uveitis of both eyes  No active inflammation in the front nor back of either eye    2. Cystoid macular edema of both eyes  Minimal cyst right eye, few small cysts left eye stable since Feb 2024.     3. Status post glaucoma surgery - Left Eye  IOP 18 today after bleb revision procedure.    4. Multiple sclerosis (H)  Copaxone 3 times per week, no Prednisone.    5. High risk medication use  Copaxone 3x/week    Plan/Recommendations:    Discussed findings with patient. There is no active inflammation in either eye and there are no new changes to the retinal scans of either eye. There are no signs of optic neuritis. There may be some dryness of the eyes from Keppra. We will modify steroid drops left eye and add lubricating drops  Eye pressure is 19,18. The left eye is doing well after the bleb rveision without aqctive anterior inflammation and April will see Dr. Sharma soon. No drops needed for pressure lowering  Recommend additional testing: None at this time. We look forward to the MRI of the brain and Dr. Sheffield's visit in June 2024  Continue these medications: Copaxone 3x/week and Keppra 3000 mg daily  For the steroid drop in the left eye, let's reduce to just once a day. No steroid drops right eye   For each eye, let's add some lubricating drops such as Refresh or Systane 2-3x/day in each eye to help with dryness   We will ask Dr. Sharma about restart losartan 4-6x/day left eye   No oral Prednisone needed.    RTC    Dr. Sharma next week please move out 3-4 weeks with Yamini same day. Refract if intereted, applanate, no dilation, no testing for SHIRA Rodriguez, MS3    Attending Physician Attestation:  Complete documentation of historical and exam elements from today's encounter can be found in the full encounter summary report  (not reduplicated in this progress note). I personally obtained the chief complaint(s) and history of present illness. I confirmed and edited as necessary the review of systems, past medical/surgical history, family history, social history, and examination findings as documented by others; and I examined the patient myself. I personally reviewed the relevant tests, images, and reports as documented above. I formulated and edited as necessary the assessment and plan and discussed the findings and management plan with the patient and family.  Sameer Kc MD.        Again, thank you for allowing me to participate in the care of your patient.      Sincerely,    Sameer Kc MD  [facility] Dept of Ophthalmology  Uveitis and Medical Retina

## 2024-04-16 DIAGNOSIS — Z98.890 POSTOPERATIVE EYE STATE: ICD-10-CM

## 2024-04-26 DIAGNOSIS — E11.65 TYPE 2 DIABETES MELLITUS WITH HYPERGLYCEMIA, WITHOUT LONG-TERM CURRENT USE OF INSULIN (H): ICD-10-CM

## 2024-04-27 RX ORDER — SEMAGLUTIDE 0.68 MG/ML
0.5 INJECTION, SOLUTION SUBCUTANEOUS
Qty: 3 ML | Refills: 0 | Status: SHIPPED | OUTPATIENT
Start: 2024-04-27 | End: 2024-06-06 | Stop reason: DRUGHIGH

## 2024-05-10 RX ORDER — PREDNISOLONE ACETATE 10 MG/ML
1-2 SUSPENSION/ DROPS OPHTHALMIC
Qty: 10 ML | Refills: 1 | OUTPATIENT
Start: 2024-05-10

## 2024-05-19 DIAGNOSIS — Z30.41 FAMILY PLANNING, BCP (BIRTH CONTROL PILLS) MAINTENANCE: ICD-10-CM

## 2024-05-28 RX ORDER — NORGESTREL AND ETHINYL ESTRADIOL 0.3-0.03MG
1 KIT ORAL EVERY EVENING
Qty: 84 TABLET | Refills: 0 | Status: SHIPPED | OUTPATIENT
Start: 2024-05-28

## 2024-05-28 NOTE — TELEPHONE ENCOUNTER
Called patient and left non-detailed voicemail to call clinic back at [phone].   When patient calls back, please inform them 3 month oral contraceptive pills supply to pharmacy and schedule them for annual physical w/ A.S in the next 1-2 months.    Doris MORTON

## 2024-06-06 ENCOUNTER — MYC MEDICAL ADVICE (OUTPATIENT)
Dept: FAMILY MEDICINE | Facility: CLINIC | Age: 44
End: 2024-06-06
Payer: COMMERCIAL

## 2024-06-06 ENCOUNTER — VIRTUAL VISIT (OUTPATIENT)
Dept: PHARMACY | Facility: CLINIC | Age: 44
End: 2024-06-06
Payer: COMMERCIAL

## 2024-06-06 DIAGNOSIS — E11.65 TYPE 2 DIABETES MELLITUS WITH HYPERGLYCEMIA, WITHOUT LONG-TERM CURRENT USE OF INSULIN (H): Primary | ICD-10-CM

## 2024-06-06 DIAGNOSIS — E78.5 HYPERLIPIDEMIA LDL GOAL <70: ICD-10-CM

## 2024-06-06 DIAGNOSIS — G35 MULTIPLE SCLEROSIS (H): ICD-10-CM

## 2024-06-06 PROCEDURE — 99607 MTMS BY PHARM ADDL 15 MIN: CPT | Mod: 95 | Performed by: PHARMACIST

## 2024-06-06 PROCEDURE — 99606 MTMS BY PHARM EST 15 MIN: CPT | Mod: 95 | Performed by: PHARMACIST

## 2024-06-06 NOTE — PROGRESS NOTES
"Medication Therapy Management (MTM) Encounter    ASSESSMENT:                            Medication Adherence/Access: No issues identified    Diabetes   AGP shows blood sugars are at goal with a TIR >70%. Last A1c at goal as well, but due for recheck. Due for UACR (last one 3/2023 was WNL). Discussed keeping her at Ozempic 0.75mg weekly to help with side effects. She felt slightly better when on insulin, so could consider adding in small dose in the future if she feels like her MS symptoms continue to worsen with higher BG.     MS:   Follow-up with Neurology scheduled.  Discussed strategies for MRIs. Also discussed potential add ons (as she is at the max dose of Keppra) - adding TCA and bedtime (amitriptyline, nortriptyline or imipramine) may be helpful for migraines and MS symptoms, however, may increase intraocular pressure/cause dry eyes, so ophth may want to weigh in.  Duloxetine may be another option.     Hyperlipidemia   She is on a moderate intensity statin. She does not have additional CV risk factors so may not be necessary to escalate treatment at this time. Due for lipid panel.     PLAN:                            Let's continue with Ozempic 0.75mg once weekly. So that you don't run out of medication we'll need to transition you to the higher (1mg) strength pen. There are 74 clicks to get to 1 mg in the Ozempic 1 mg pen. To get 0.75 mg on the 1 mg pen, you will count to 56 clicks. There will not be any denotation/marking that you have gotten to 0.75 mg on the 1 mg pen, so it is important to ensure you count the clicks correctly. The first time you are counting clicks, it is recommended to count the 74 clicks to get to 1 mg to ensure counting correctly (the pen will say 1 mg once you get to that dose), then turn the knob back to \"0\" and count to 56 clicks to get the 0.75 mg dose.    Schedule with Dr. Clemons, also schedule fasting lab work  When you get your MRI, communicate with the MRI tech that you are " "having a lot of MS symptoms and let them know you would like to stop between scans to move around a bit. They should totally understand and let you do this.  If it does not go well, you could try a low dose of a medicine like lorazepam or alprazolam to help relax the back muscles and nerves before the next scan. Dr. Sheffield would have to write this prescription, but it's pretty commonly done.   Here's the medications we talked about that may help as an add-on to Keppra - adding TCA and bedtime (amitriptyline, nortriptyline or imipramine) may be helpful for migraines and MS symptoms, however, may increase intraocular pressure/cause dry eyes, so ophth may want to weigh in.  Duloxetine may be another option.   I called and canceled the prescription for Trulicity at [facility], hopefully they won't get that one ready for you again!     Follow-up: I'll watch for the notes from your scans and visit next week - please reach out if you need anything!     SUBJECTIVE/OBJECTIVE:                          Alejandra Forbes is a 44 year old female contacted via secure video for a follow-up visit.       Reason for visit: follow-up med review.    Allergies/ADRs: Reviewed in chart  Past Medical History: Reviewed in chart  Tobacco: She reports that she has never smoked. She has never used smokeless tobacco.  Alcohol: not currently using    Medication Adherence/Access: no issues reported    Diabetes   Ozempic 0.75mg weekly (just increased on Sunday)  Metformin ER 500mg 2 tabs twice daily     Side effects: one bout of vomiting on Sunday after increasing dose, which is \"pretty normal for me after a dose increase\". No other concerns.     Blood sugar monitoring: Continuous Glucose Monitor see AGP below     Eye exam is up to date  Foot exam: due    MS:   Copaxone 40mg/mL three times per week  Keppra 1500mg twice daily    She is still having concerns about energy and \"electrical fire storm\" in her back that is at it's worst in the AM and PM. See her " jose alejandro message from today for full details of symptoms.  She is concerned about her upcoming MRI and being able to lie still during that time.     She is closely following with her neurology (MRIs and follow-up visit on 6/11) and ophthalmology team (last visit on 4/15 - everything stable at that time).     Hyperlipidemia   Atorvastatin 20mg daily  Patient reports no significant myalgias or other side effects.   ----------------  I spent 24 minutes with this patient today. All changes were made via collaborative practice agreement with Shivani Clemons MD. A copy of the visit note was provided to the patient's provider(s).    A summary of these recommendations was given to the patient.    Cherelle Almanzar, Pharm.D., M.B.A., [facility]  MTM Pharmacist, [facility]    Telemedicine Visit Details  Type of service:  Video Conference via [employer]  Joined the call at 6/6/2024, 3:14:49 pm.  Left the call at 6/6/2024, 3:38:50 pm.  You were on the call for 24 minutes 1 second .           Medication Therapy Recommendations  No medication therapy recommendations to display

## 2024-06-06 NOTE — PATIENT INSTRUCTIONS
"Recommendations from today's MTM visit:                                                    MTM (medication therapy management) is a service provided by a clinical pharmacist designed to help you get the most of out of your medicines.   Today we reviewed what your medicines are for, how to know if they are working, that your medicines are safe and how to make your medicine regimen as easy as possible.      Let's continue with Ozempic 0.75mg once weekly. So that you don't run out of medication we'll need to transition you to the higher (1mg) strength pen. There are 74 clicks to get to 1 mg in the Ozempic 1 mg pen. To get 0.75 mg on the 1 mg pen, you will count to 56 clicks. There will not be any denotation/marking that you have gotten to 0.75 mg on the 1 mg pen, so it is important to ensure you count the clicks correctly. The first time you are counting clicks, it is recommended to count the 74 clicks to get to 1 mg to ensure counting correctly (the pen will say 1 mg once you get to that dose), then turn the knob back to \"0\" and count to 56 clicks to get the 0.75 mg dose.    Schedule with Dr. Clemons, also schedule fasting lab work  When you get your MRI, communicate with the MRI tech that you are having a lot of MS symptoms and let them know you would like to stop between scans to move around a bit. They should totally understand and let you do this.  If it does not go well, you could try a low dose of a medicine like lorazepam or alprazolam to help relax the back muscles and nerves before the next scan. Dr. Sheffield would have to write this prescription, but it's pretty commonly done.   Here's the medications we talked about that may help as an add-on to Keppra - adding TCA and bedtime (amitriptyline, nortriptyline or imipramine) may be helpful for migraines and MS symptoms, however, may increase intraocular pressure/cause dry eyes, so ophth may want to weigh in.  Duloxetine may be another option.   I called and " "canceled the prescription for Trulicity at [facility], hopefully they won't get that one ready for you again!     Follow-up: I'll watch for the notes from your scans and visit next week - please reach out if you need anything!     It was great speaking with you today.  I value your experience and would be very thankful for your time in providing feedback in our clinic survey. In the next few days, you may receive an email or text message from [employer] with a link to a survey related to your  clinical pharmacist.\"     To schedule another MTM appointment, please call the clinic directly or you may call the MTM scheduling line at [phone] or toll-free at [phone].     My Clinical Pharmacist's contact information:                                                      Please feel free to contact me with any questions or concerns you have.      Cherelle Almanzar, Pharm.D., M.B.A., [facility]  MTM Pharmacist, [facility]  Pager: [phone]  Email: [email or web address]      "

## 2024-06-11 ENCOUNTER — OFFICE VISIT (OUTPATIENT)
Dept: NEUROLOGY | Facility: CLINIC | Age: 44
End: 2024-06-11
Attending: PSYCHIATRY & NEUROLOGY
Payer: COMMERCIAL

## 2024-06-11 ENCOUNTER — ANCILLARY PROCEDURE (OUTPATIENT)
Dept: MRI IMAGING | Facility: CLINIC | Age: 44
End: 2024-06-11
Attending: PSYCHIATRY & NEUROLOGY
Payer: COMMERCIAL

## 2024-06-11 VITALS
HEART RATE: 70 BPM | OXYGEN SATURATION: 99 % | WEIGHT: 233.1 LBS | BODY MASS INDEX: 44.99 KG/M2 | DIASTOLIC BLOOD PRESSURE: 86 MMHG | SYSTOLIC BLOOD PRESSURE: 132 MMHG

## 2024-06-11 DIAGNOSIS — G35 MULTIPLE SCLEROSIS (H): ICD-10-CM

## 2024-06-11 DIAGNOSIS — R20.2 PARESTHESIAS: Primary | ICD-10-CM

## 2024-06-11 DIAGNOSIS — H53.19 PHOTOPSIA: ICD-10-CM

## 2024-06-11 PROCEDURE — 70553 MRI BRAIN STEM W/O & W/DYE: CPT | Mod: GC | Performed by: STUDENT IN AN ORGANIZED HEALTH CARE EDUCATION/TRAINING PROGRAM

## 2024-06-11 PROCEDURE — 99417 PROLNG OP E/M EACH 15 MIN: CPT | Performed by: PSYCHIATRY & NEUROLOGY

## 2024-06-11 PROCEDURE — 99214 OFFICE O/P EST MOD 30 MIN: CPT | Performed by: PSYCHIATRY & NEUROLOGY

## 2024-06-11 PROCEDURE — A9585 GADOBUTROL INJECTION: HCPCS | Mod: JZ | Performed by: STUDENT IN AN ORGANIZED HEALTH CARE EDUCATION/TRAINING PROGRAM

## 2024-06-11 PROCEDURE — 99215 OFFICE O/P EST HI 40 MIN: CPT | Performed by: PSYCHIATRY & NEUROLOGY

## 2024-06-11 PROCEDURE — 72156 MRI NECK SPINE W/O & W/DYE: CPT | Mod: GC | Performed by: RADIOLOGY

## 2024-06-11 RX ORDER — GABAPENTIN 300 MG/1
300 CAPSULE ORAL 3 TIMES DAILY
Qty: 90 CAPSULE | Refills: 11 | Status: SHIPPED | OUTPATIENT
Start: 2024-06-11

## 2024-06-11 RX ORDER — LEVETIRACETAM 1000 MG/1
1500 TABLET ORAL 2 TIMES DAILY
Qty: 270 TABLET | Refills: 3 | Status: SHIPPED | OUTPATIENT
Start: 2024-06-11

## 2024-06-11 RX ORDER — GADOBUTROL 604.72 MG/ML
10 INJECTION INTRAVENOUS ONCE
Status: COMPLETED | OUTPATIENT
Start: 2024-06-11 | End: 2024-06-11

## 2024-06-11 RX ADMIN — GADOBUTROL 10 ML: 604.72 INJECTION INTRAVENOUS at 12:14

## 2024-06-11 ASSESSMENT — PAIN SCALES - GENERAL: PAINLEVEL: SEVERE PAIN (6)

## 2024-06-11 NOTE — Clinical Note
"6/11/2024       RE: Alejandra Forbes  [address]  [place] [address]     Dear Colleague,    Thank you for referring your patient, Alejandra Forbes, to the [facility]ULTIP[facility] [place] at [facility]. Please see a copy of my visit note below.    ID: Alejandra Forbes is a 44-year-old woman who I follow for multiple sclerosis.  She returns for routine follow-up and MRI review.  I last saw her in December 2023.    HPI: April has contacted us several times since I saw her last, regarding unusual sensory symptoms she has been experiencing.  Basically she feels a \"buildup of energy\" branching out from her lower back and up towards her head, which she describes at various times as \"like heat\" or \"like an energy surge.  I thought these might be a paroxysmal sensory symptoms and MS and tried levetiracetam, and she says that has helped some and she called several times for an increased dose and is now on a high-dose of 1500 mg twice daily.  She says it is still something that is definitely bothering her now.  She also describes unusual visual symptoms, where she sees \"internal light\" of different colors which can occur anytime, even during sleep.  She also describes a visual phenomenon where \"it is like I can see a hole in my brain\".  I asked if she had discussed these photopsias with her ophthalmologist (with whom she follows for recurrent uveitis), and I believe she said she has not.    At last visit we opted to restart glatiramer acetate.  She is tolerating that only marginally, mainly due to local injection site reactions.  She had a repeat MRI today.  Unfortunately it is difficult to directly compare with her last brain MRI from 11/14/2023, because for some reason there is no axial FLAIR sequence on today's scan and no sagittal FLAIR sequences on the previous scan.  There were no enhancing lesions today.  The interpreting radiologist felt " "there were \"multiple more conspicuous demyelinating foci\" on the current scan, but that this could be a result of a difference in technique.  The cervical spine MRI showed a normal-appearing spinal cord.    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    Steroid responders     Uveitis         Current Outpatient Medications:     albuterol (PROAIR HFA/PROVENTIL HFA/VENTOLIN HFA) 108 (90 Base) MCG/ACT inhaler, Inhale 2 puffs into the lungs every 6 hours as needed for shortness of breath / dyspnea or wheezing, Disp: 6.7 g, Rfl: 11    atorvastatin (LIPITOR) 20 MG tablet, Take 1 tablet (20 mg) by mouth daily, Disp: 90 tablet, Rfl: 1    buPROPion (WELLBUTRIN XL) 300 MG 24 hr tablet, TAKE 1 TABLET BY MOUTH EVERY MORNING, Disp: 90 tablet, Rfl: 1    COMPOUNDED NON-CONTROLLED SUBSTANCE (CMPD RX) - PHARMACY TO MIX COMPOUNDED MEDICATION, Losartan ophthalmic drops 1 drop 6 times daily in left eye, Disp: 6 mL, Rfl: 5    CONTOUR " NEXT TEST test strip, USE TO TEST BLOOD SUGAR 3 TIMES DAILY OR AS DIRECTED., Disp: 100 strip, Rfl: 4    erythromycin (ROMYCIN) 5 MG/GM ophthalmic ointment, Place 0.5 inches Into the left eye at bedtime, Disp: 3.5 g, Rfl: 2    famotidine (PEPCID) 20 MG tablet, Take 1 tablet (20 mg) by mouth 2 times daily (Patient taking differently: Take 20 mg by mouth as needed), Disp: 90 tablet, Rfl: 1    gabapentin (NEURONTIN) 300 MG capsule, Take 1 capsule (300 mg) by mouth 3 times daily, Disp: 90 capsule, Rfl: 11    glatiramer acetate 40 MG/ML injection, Inject 40 mg Subcutaneous three times a week, Disp: 12 mL, Rfl: 11    ibuprofen (ADVIL,MOTRIN) 200 MG tablet, Take 2-3 tablets by mouth as needed, Disp: , Rfl:     levETIRAcetam (KEPPRA) 1000 MG tablet, Take 1.5 tablets (1,500 mg) by mouth 2 times daily, Disp: 270 tablet, Rfl: 3    lisinopril (ZESTRIL) 2.5 MG tablet, Take 1 tablet (2.5 mg) by mouth daily, Disp: 90 tablet, Rfl: 1    metFORMIN (GLUCOPHAGE XR) 500 MG 24 hr tablet, Take 4 tablets (2,000 mg) by mouth every evening, Disp: 360 tablet, Rfl: 1    norgestrel-ethinyl estradiol (LOW-OGESTREL) 0.3-30 MG-MCG tablet, Take 1 tablet by mouth every evening TAKE 1 TABLET BY MOUTH EVERY DAY., Disp: 84 tablet, Rfl: 0    prednisoLONE acetate (PRED FORTE) 1 % ophthalmic suspension, Place 1-2 drops Into the left eye 6 times daily, Disp: 10 mL, Rfl: 1    Semaglutide, 1 MG/DOSE, (OZEMPIC) 4 MG/3ML pen, Inject 1 mg Subcutaneous every 7 days, Disp: 9 mL, Rfl: 1    VITAMIN D PO, Take 1 capsule by mouth daily, Disp: , Rfl:     Continuous Glucose Sensor (FREESTYLE GAMA 2 SENSOR) MISC, CHANGE EVERY 14 DAYS, Disp: 1 each, Rfl: 5    Current Facility-Administered Medications:     triamcinolone (KENALOG-40) injection 40 mg, 40 mg, Subtenon injection, Q2 Months, Sameer Kc MD     Exam: No physical exam was done today as the entire visit was spent in counseling and coordination of care.    Impression: Relapsing remitting multiple  sclerosis (radiologically isolated syndrome with accumulating MRI lesions), with continued absence of clinical relapses but with unusual sensory and visual symptoms.  I spent 55 minutes on her care on the date of service including chart review and face-to-face time.  We discussed the unusual electrical/energy sensations.  I told her that dysesthesias generally try to treat symptomatically with either SNRIs or anticonvulsants.  We discussed adding something like nortriptyline or duloxetine versus a second anticonvulsant with gabapentin, and ultimately opted for the latter.  We discussed the possible treatment change given her marginal tolerability with the glatiramer.  I went over options including those we had previously discussed and agreed that teriflunomide would be a reasonable choice.    Plan: Gabapentin 300 mg by mouth twice a day.  Will get baseline labs for teriflunomide (QuantiFERON, AST, ALT), and if she would like to proceed we can switch to that.  Follow-up in 6 months.    This note was completed in part using voice-recognition software, and some typographic errors may be present as a result.       Again, thank you for allowing me to participate in the care of your patient.      Sincerely,    Fredo Sheffield MD

## 2024-06-11 NOTE — NURSING NOTE
Chief Complaint   Patient presents with    MS    RECHECK     6 month follow up      Vitals were taken and medications were reconciled.  Guanakito Warren, EMT  2:52 PM

## 2024-06-13 ENCOUNTER — TELEPHONE (OUTPATIENT)
Dept: NEUROLOGY | Facility: CLINIC | Age: 44
End: 2024-06-13

## 2024-06-13 DIAGNOSIS — E11.65 TYPE 2 DIABETES MELLITUS WITH HYPERGLYCEMIA, WITHOUT LONG-TERM CURRENT USE OF INSULIN (H): ICD-10-CM

## 2024-06-13 DIAGNOSIS — R73.9 STEROID-INDUCED HYPERGLYCEMIA: ICD-10-CM

## 2024-06-13 DIAGNOSIS — G35 MULTIPLE SCLEROSIS (H): Primary | ICD-10-CM

## 2024-06-13 DIAGNOSIS — T38.0X5A STEROID-INDUCED HYPERGLYCEMIA: ICD-10-CM

## 2024-06-13 NOTE — TELEPHONE ENCOUNTER
Prior Authorization Specialty Medication Request    Medication/Dose: Teriflunomide 14mg tabs     Diagnosis and ICD code (if different than what is on RX):  Multiple Sclerosis, G35  New/renewal/insurance change PA/secondary ins. PA: NEW  Previously Tried and Failed:      Important Lab Values:   Rationale: Initiation of disease modifying therapy for demyelinating disease, please approve    Insurance   Primary: [employer] [place] [employer]  Insurance ID:  [id number]    Secondary (if applicable):  Insurance ID:      Pharmacy Information (if different than what is on RX)  Name:    Phone:    Fax:

## 2024-06-17 NOTE — PROGRESS NOTES
"ID: Alejandra Forbes is a 44-year-old woman who I follow for multiple sclerosis.  She returns for routine follow-up and MRI review.  I last saw her in December 2023.    HPI: April has contacted us several times since I saw her last, regarding unusual sensory symptoms she has been experiencing.  Basically she feels a \"buildup of energy\" branching out from her lower back and up towards her head, which she describes at various times as \"like heat\" or \"like an energy surge.  I thought these might be a paroxysmal sensory symptoms and MS and tried levetiracetam, and she says that has helped some and she called several times for an increased dose and is now on a high-dose of 1500 mg twice daily.  She says it is still something that is definitely bothering her now.  She also describes unusual visual symptoms, where she sees \"internal light\" of different colors which can occur anytime, even during sleep.  She also describes a visual phenomenon where \"it is like I can see a hole in my brain\".  I asked if she had discussed these photopsias with her ophthalmologist (with whom she follows for recurrent uveitis), and I believe she said she has not.    At last visit we opted to restart glatiramer acetate.  She is tolerating that only marginally, mainly due to local injection site reactions.  She had a repeat MRI today.  Unfortunately it is difficult to directly compare with her last brain MRI from 11/14/2023, because for some reason there is no axial FLAIR sequence on today's scan and no sagittal FLAIR sequences on the previous scan.  There were no enhancing lesions today.  The interpreting radiologist felt there were \"multiple more conspicuous demyelinating foci\" on the current scan, but that this could be a result of a difference in technique.  The cervical spine MRI showed a normal-appearing spinal cord.    Past Medical History:   Diagnosis Date    Allergic rhinitis, cause unspecified     no meds taken, rare flonase    Anxiety " attack 08/28/2015    Asthma, exercise induced 03/01/2013    Bipolar 2 disorder (H)     Diabetes (H)     Health Care Home 02/24/2012    X  DX V65.8 REPLACED WITH [id number] (04/08/2013)    Hypertension     Insomnia 11/18/2014    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 06/08/2015    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    Steroid responders     Uveitis         Current Outpatient Medications:     albuterol (PROAIR HFA/PROVENTIL HFA/VENTOLIN HFA) 108 (90 Base) MCG/ACT inhaler, Inhale 2 puffs into the lungs every 6 hours as needed for shortness of breath / dyspnea or wheezing, Disp: 6.7 g, Rfl: 11    atorvastatin (LIPITOR) 20 MG tablet, Take 1 tablet (20 mg) by mouth daily, Disp: 90 tablet, Rfl: 1    buPROPion (WELLBUTRIN XL) 300 MG 24 hr tablet, TAKE 1 TABLET BY MOUTH EVERY MORNING, Disp: 90 tablet, Rfl: 1    COMPOUNDED NON-CONTROLLED SUBSTANCE (CMPD RX) - PHARMACY TO MIX COMPOUNDED MEDICATION, Losartan ophthalmic drops 1 drop 6 times daily in left eye, Disp: 6 mL, Rfl: 5    CONTOUR NEXT TEST test strip, USE TO TEST BLOOD SUGAR 3 TIMES DAILY OR AS DIRECTED., Disp: 100 strip, Rfl: 4    erythromycin (ROMYCIN) 5 MG/GM ophthalmic ointment, Place 0.5 inches Into the left eye at bedtime, Disp: 3.5 g, Rfl: 2    famotidine (PEPCID) 20 MG tablet, Take 1 tablet (20 mg) by mouth 2 times daily (Patient taking  differently: Take 20 mg by mouth as needed), Disp: 90 tablet, Rfl: 1    gabapentin (NEURONTIN) 300 MG capsule, Take 1 capsule (300 mg) by mouth 3 times daily, Disp: 90 capsule, Rfl: 11    glatiramer acetate 40 MG/ML injection, Inject 40 mg Subcutaneous three times a week, Disp: 12 mL, Rfl: 11    ibuprofen (ADVIL,MOTRIN) 200 MG tablet, Take 2-3 tablets by mouth as needed, Disp: , Rfl:     levETIRAcetam (KEPPRA) 1000 MG tablet, Take 1.5 tablets (1,500 mg) by mouth 2 times daily, Disp: 270 tablet, Rfl: 3    lisinopril (ZESTRIL) 2.5 MG tablet, Take 1 tablet (2.5 mg) by mouth daily, Disp: 90 tablet, Rfl: 1    metFORMIN (GLUCOPHAGE XR) 500 MG 24 hr tablet, Take 4 tablets (2,000 mg) by mouth every evening, Disp: 360 tablet, Rfl: 1    norgestrel-ethinyl estradiol (LOW-OGESTREL) 0.3-30 MG-MCG tablet, Take 1 tablet by mouth every evening TAKE 1 TABLET BY MOUTH EVERY DAY., Disp: 84 tablet, Rfl: 0    prednisoLONE acetate (PRED FORTE) 1 % ophthalmic suspension, Place 1-2 drops Into the left eye 6 times daily, Disp: 10 mL, Rfl: 1    Semaglutide, 1 MG/DOSE, (OZEMPIC) 4 MG/3ML pen, Inject 1 mg Subcutaneous every 7 days, Disp: 9 mL, Rfl: 1    VITAMIN D PO, Take 1 capsule by mouth daily, Disp: , Rfl:     Continuous Glucose Sensor (FREESTYLE GAMA 2 SENSOR) MISC, CHANGE EVERY 14 DAYS, Disp: 1 each, Rfl: 5    Current Facility-Administered Medications:     triamcinolone (KENALOG-40) injection 40 mg, 40 mg, Subtenon injection, Q2 Months, Sameer Kc MD     Exam: No physical exam was done today as the entire visit was spent in counseling and coordination of care.    Impression: Relapsing remitting multiple sclerosis (radiologically isolated syndrome with accumulating MRI lesions), with continued absence of clinical relapses but with unusual sensory and visual symptoms.  I spent 55 minutes on her care on the date of service including chart review and face-to-face time.  We discussed the unusual electrical/energy sensations.  I  told her that dysesthesias generally try to treat symptomatically with either SNRIs or anticonvulsants.  We discussed adding something like nortriptyline or duloxetine versus a second anticonvulsant with gabapentin, and ultimately opted for the latter.  We discussed the possible treatment change given her marginal tolerability with the glatiramer.  I went over options including those we had previously discussed and agreed that teriflunomide would be a reasonable choice.    Plan: Gabapentin 300 mg by mouth twice a day.  Will get baseline labs for teriflunomide (QuantiFERON, AST, ALT), and if she would like to proceed we can switch to that.  Follow-up in 6 months.    This note was completed in part using voice-recognition software, and some typographic errors may be present as a result.

## 2024-06-18 NOTE — TELEPHONE ENCOUNTER
PA Initiation    Medication: TERIFLUNOMIDE 14 MG PO TABS  Insurance Company: [employer] - Phone [phone] Fax [phone]  Pharmacy Filling the Rx:    Filling Pharmacy Phone:    Filling Pharmacy Fax:    Start Date: 6/18/2024    [id number]

## 2024-06-19 RX ORDER — TERIFLUNOMIDE 14 MG/1
14 TABLET, FILM COATED ORAL DAILY
Qty: 30 TABLET | Refills: 11 | Status: SHIPPED | OUTPATIENT
Start: 2024-06-19

## 2024-06-19 NOTE — TELEPHONE ENCOUNTER
Prior Authorization Approval    Medication: TERIFLUNOMIDE 14 MG PO TABS  Authorization Effective Date: 6/18/2024  Authorization Expiration Date: 6/18/2025  Approved Dose/Quantity: 30 tabs per 30 days  Reference #: [id number]   Insurance Company: [employer] - Phone [phone] Fax [phone]  Expected CoPay: $    CoPay Card Available:      Financial Assistance Needed: Unknown  Which Pharm[facility] is filling the prescription: [facility]  Pharmacy Notified: Once RX is sent  Patient Notified: Once RX is sent    Patient must fill with [facility] per insurance restriction

## 2024-06-19 NOTE — TELEPHONE ENCOUNTER
April now approved for teriflunomide. Restricted to [facility] so unsure of cost. Will send my chart message updating April. [facility]ide Rx sent to [facility].      Traci West RN

## 2024-06-23 ENCOUNTER — HEALTH MAINTENANCE LETTER (OUTPATIENT)
Age: 44
End: 2024-06-23

## 2024-07-19 ENCOUNTER — LAB (OUTPATIENT)
Dept: LAB | Facility: CLINIC | Age: 44
End: 2024-07-19
Payer: COMMERCIAL

## 2024-07-19 DIAGNOSIS — G35 MULTIPLE SCLEROSIS (H): ICD-10-CM

## 2024-07-19 DIAGNOSIS — E11.65 TYPE 2 DIABETES MELLITUS WITH HYPERGLYCEMIA, WITHOUT LONG-TERM CURRENT USE OF INSULIN (H): ICD-10-CM

## 2024-07-19 LAB — HBA1C MFR BLD: 7.1 % (ref 0–5.6)

## 2024-07-19 PROCEDURE — 84450 TRANSFERASE (AST) (SGOT): CPT

## 2024-07-19 PROCEDURE — 80048 BASIC METABOLIC PNL TOTAL CA: CPT

## 2024-07-19 PROCEDURE — 82570 ASSAY OF URINE CREATININE: CPT

## 2024-07-19 PROCEDURE — 84460 ALANINE AMINO (ALT) (SGPT): CPT

## 2024-07-19 PROCEDURE — 80061 LIPID PANEL: CPT

## 2024-07-19 PROCEDURE — 82043 UR ALBUMIN QUANTITATIVE: CPT

## 2024-07-19 PROCEDURE — 36415 COLL VENOUS BLD VENIPUNCTURE: CPT

## 2024-07-19 PROCEDURE — 86481 TB AG RESPONSE T-CELL SUSP: CPT

## 2024-07-19 PROCEDURE — 83036 HEMOGLOBIN GLYCOSYLATED A1C: CPT

## 2024-07-22 LAB
ALT SERPL W P-5'-P-CCNC: 9 U/L (ref 0–50)
ANION GAP SERPL CALCULATED.3IONS-SCNC: 12 MMOL/L (ref 7–15)
AST SERPL W P-5'-P-CCNC: 14 U/L (ref 0–45)
BUN SERPL-MCNC: 10.3 MG/DL (ref 6–20)
CALCIUM SERPL-MCNC: 9.3 MG/DL (ref 8.8–10.4)
CHLORIDE SERPL-SCNC: 105 MMOL/L (ref 98–107)
CHOLEST SERPL-MCNC: 174 MG/DL
CREAT SERPL-MCNC: 0.56 MG/DL (ref 0.51–0.95)
CREAT UR-MCNC: 115 MG/DL
EGFRCR SERPLBLD CKD-EPI 2021: >90 ML/MIN/1.73M2
FASTING STATUS PATIENT QL REPORTED: YES
FASTING STATUS PATIENT QL REPORTED: YES
GAMMA INTERFERON BACKGROUND BLD IA-ACNC: 0 IU/ML
GLUCOSE SERPL-MCNC: 120 MG/DL (ref 70–99)
HCO3 SERPL-SCNC: 20 MMOL/L (ref 22–29)
HDLC SERPL-MCNC: 35 MG/DL
LDLC SERPL CALC-MCNC: 119 MG/DL
M TB IFN-G BLD-IMP: NEGATIVE
M TB IFN-G CD4+ BCKGRND COR BLD-ACNC: 10 IU/ML
MICROALBUMIN UR-MCNC: <12 MG/L
MICROALBUMIN/CREAT UR: NORMAL MG/G{CREAT}
MITOGEN IGNF BCKGRD COR BLD-ACNC: 0 IU/ML
MITOGEN IGNF BCKGRD COR BLD-ACNC: 0.01 IU/ML
NONHDLC SERPL-MCNC: 139 MG/DL
POTASSIUM SERPL-SCNC: 4.4 MMOL/L (ref 3.4–5.3)
QUANTIFERON MITOGEN: 10 IU/ML
QUANTIFERON NIL TUBE: 0 IU/ML
QUANTIFERON TB1 TUBE: 0.01 IU/ML
QUANTIFERON TB2 TUBE: 0
SODIUM SERPL-SCNC: 137 MMOL/L (ref 135–145)
TRIGL SERPL-MCNC: 98 MG/DL

## 2024-07-25 ENCOUNTER — VIRTUAL VISIT (OUTPATIENT)
Dept: PHARMACY | Facility: CLINIC | Age: 44
End: 2024-07-25
Payer: COMMERCIAL

## 2024-07-25 DIAGNOSIS — E11.65 TYPE 2 DIABETES MELLITUS WITH HYPERGLYCEMIA, WITHOUT LONG-TERM CURRENT USE OF INSULIN (H): Primary | ICD-10-CM

## 2024-07-25 PROCEDURE — 99607 MTMS BY PHARM ADDL 15 MIN: CPT | Mod: 93 | Performed by: PHARMACIST

## 2024-07-25 PROCEDURE — 99606 MTMS BY PHARM EST 15 MIN: CPT | Mod: 93 | Performed by: PHARMACIST

## 2024-07-25 RX ORDER — PEN NEEDLE, DIABETIC 32GX 5/32"
NEEDLE, DISPOSABLE MISCELLANEOUS
Qty: 100 EACH | Refills: 0 | Status: SHIPPED | OUTPATIENT
Start: 2024-07-25

## 2024-07-25 NOTE — PROGRESS NOTES
Medication Therapy Management (MTM) Encounter    ASSESSMENT:                            Medication Adherence/Access: No issues identified    Diabetes   Her A1c is near goal <7%. Her TIR is at goal >70%.  We had a long discussion today that I believe something like an SGLT2i (renal, CV benefit, weight loss benefit, etc) would be a better fit and have less risk (hypoglycemia).  However she feels that she is not goal to start a new medication right now. She has not tolerated higher doses of GLP1 due to side effects.  She is very strong in her convictions that she needs additional therapy to help control high fasting blood sugars and that they are affecting her MS symptoms.  We do long discussion about the risks of hypoglycemia long-acting insulin will never control heart after meal blood sugar spikes.  UACR at goal. LDL above goal <70 - but again, not ready to make changes to other meds, will address increasing statin in the future.       PLAN:                            Start Lantus 5 units once daily at bedtime. You can increase by 1 unit every 3-4 days until your blood sugar is under 120 first thing in the morning.  If your blood sugars are dropping less than 70 overnight or early in the morning, reduce the dose or stop.     Follow-up: Let's connect in about 2 weeks over [employer] [facility] [place]    SUBJECTIVE/OBJECTIVE:                          Alejandra Forbes is a 44 year old female seen for a follow-up visit.       Reason for visit: Would like to start insulin.    Allergies/ADRs: Reviewed in chart  Past Medical History: Reviewed in chart  Tobacco: She reports that she has never smoked. She has never used smokeless tobacco.  Alcohol: not currently using    Medication Adherence/Access: no issues reported    Diabetes   Ozempic 1mg weekly   Metformin ER 500mg 4 tabs twice daily     Side effects: Struggles to eat more than one meal a day (but she has actually been doing this for several years). Some nausea/vomiting the day she takes her  dose, but improved over previous trials of semaglutide. Finds she is getting good appetite control, helps maintain her weight.     Discussing med changes she indicates she is not in a place where she can entertain trying new medications right now (and will be changing MS DMT).     She is asking to escalate therapy because she is seeing trends of higher morning blood sugars that are making her not feel well - overall feels like they exacerbate her MS symptoms. She feels that it's a significant impact to her life. Also feels like she cannot eat     Blood sugar monitoring: Continuous Glucose Monitor see AGP below     Eye exam is up to date  Foot exam: due  ----------------  I spent 25 minutes with this patient today. All changes were made via collaborative practice agreement with Shivani Clemons MD. A copy of the visit note was provided to the patient's provider(s).    A summary of these recommendations was sent via [employer].    Cherelle Almanzar, DamienD, GA, BCACP  MTM Pharmacist, [facility]     Telemedicine Visit Details  Type of service:  Telephone visit  Start Time:  7:36 AM  End Time:  8:01 AM     Medication Therapy Recommendations  Type 2 diabetes mellitus with hyperglycemia, without long-term current use of insulin (H)    Current Medication: Semaglutide, 1 MG/DOSE, (OZEMPIC) 4 MG/3ML pen   Rationale: Synergistic therapy - Needs additional medication therapy - Indication   Recommendation: Start Medication - Lantus SoloStar 100 UNIT/ML soln   Status: Accepted per CPA

## 2024-07-25 NOTE — PATIENT INSTRUCTIONS
"Recommendations from today's MTM visit:                                                    MTM (medication therapy management) is a service provided by a clinical pharmacist designed to help you get the most of out of your medicines.   Today we reviewed what your medicines are for, how to know if they are working, that your medicines are safe and how to make your medicine regimen as easy as possible.      Start Lantus 5 units once daily at bedtime. You can increase by 1 unit every 3-4 days until your blood sugar is under 120 first thing in the morning.  If your blood sugars are dropping less than 70 overnight or early in the morning, reduce the dose or stop.     Follow-up: Let's connect in about 2 weeks over [employer]    It was great speaking with you today.  I value your experience and would be very thankful for your time in providing feedback in our clinic survey. In the next few days, you may receive an email or text message from [employer] with a link to a survey related to your  clinical pharmacist.\"     To schedule another MTM appointment, please call the clinic directly or you may call the MTM scheduling line at [phone] or toll-free at [phone].     My Clinical Pharmacist's contact information:                                                      Please feel free to contact me with any questions or concerns you have.      Cherelle Almanzar, Pharm.D., M.B.A., [facility]  MTM Pharmacist, [facility]  Pager: [phone]  Email: [email or web address]      "

## 2024-08-10 DIAGNOSIS — E11.65 TYPE 2 DIABETES MELLITUS WITH HYPERGLYCEMIA, WITHOUT LONG-TERM CURRENT USE OF INSULIN (H): ICD-10-CM

## 2024-08-12 RX ORDER — ATORVASTATIN CALCIUM 20 MG/1
20 TABLET, FILM COATED ORAL DAILY
Qty: 90 TABLET | Refills: 1 | Status: SHIPPED | OUTPATIENT
Start: 2024-08-12

## 2024-11-10 ENCOUNTER — HEALTH MAINTENANCE LETTER (OUTPATIENT)
Age: 44
End: 2024-11-10

## 2024-11-17 DIAGNOSIS — E11.65 TYPE 2 DIABETES MELLITUS WITH HYPERGLYCEMIA, WITHOUT LONG-TERM CURRENT USE OF INSULIN (H): ICD-10-CM

## 2024-11-18 RX ORDER — INSULIN GLARGINE 100 [IU]/ML
INJECTION, SOLUTION SUBCUTANEOUS
OUTPATIENT
Start: 2024-11-18

## 2024-11-18 NOTE — TELEPHONE ENCOUNTER
Please call patient today  Offer appointment for tomorrow, ideally office and or at the least video visit.  Has not been seen in clinic since 3/2023- ca not refill meds any longer

## 2024-11-19 NOTE — TELEPHONE ENCOUNTER
April calling back to schedule.  She needs her meds refilled.  Please call back ASAP to get her in for appt. Marilin Boles RN

## 2024-11-19 NOTE — TELEPHONE ENCOUNTER
Called patient LVM To call back and ask for a TC To schedule sooner  [facility] Coordinator

## 2024-11-20 ENCOUNTER — MYC MEDICAL ADVICE (OUTPATIENT)
Dept: FAMILY MEDICINE | Facility: CLINIC | Age: 44
End: 2024-11-20
Payer: COMMERCIAL

## 2024-11-20 DIAGNOSIS — E11.65 TYPE 2 DIABETES MELLITUS WITH HYPERGLYCEMIA, WITHOUT LONG-TERM CURRENT USE OF INSULIN (H): ICD-10-CM

## 2024-11-21 NOTE — TELEPHONE ENCOUNTER
Duplicate encounter.   Please see tele encounter dated 11/17/24 for insulin glargine refill request and reason for refusal (pt not seen by Kaci since Aug 2023).       Dr. Clemons -     Pt is scheduled for VV on Tues 11/26 at 5pm with you.  Pt requesting refill prior to that appt time.     Quyen WATTS RN

## 2024-11-26 ENCOUNTER — MYC MEDICAL ADVICE (OUTPATIENT)
Dept: FAMILY MEDICINE | Facility: CLINIC | Age: 44
End: 2024-11-26

## 2024-11-26 ENCOUNTER — VIRTUAL VISIT (OUTPATIENT)
Dept: FAMILY MEDICINE | Facility: CLINIC | Age: 44
End: 2024-11-26
Payer: COMMERCIAL

## 2024-11-26 DIAGNOSIS — Z12.11 COLON CANCER SCREENING: ICD-10-CM

## 2024-11-26 DIAGNOSIS — K21.9 GASTROESOPHAGEAL REFLUX DISEASE WITHOUT ESOPHAGITIS: ICD-10-CM

## 2024-11-26 DIAGNOSIS — R10.84 GENERALIZED POSTPRANDIAL ABDOMINAL PAIN: ICD-10-CM

## 2024-11-26 DIAGNOSIS — G35 MULTIPLE SCLEROSIS (H): ICD-10-CM

## 2024-11-26 DIAGNOSIS — F31.81 BIPOLAR 2 DISORDER (H): ICD-10-CM

## 2024-11-26 DIAGNOSIS — Z23 NEED FOR VACCINATION: ICD-10-CM

## 2024-11-26 DIAGNOSIS — E66.813 CLASS 3 SEVERE OBESITY DUE TO EXCESS CALORIES WITH SERIOUS COMORBIDITY AND BODY MASS INDEX (BMI) OF 45.0 TO 49.9 IN ADULT (H): ICD-10-CM

## 2024-11-26 DIAGNOSIS — E11.65 TYPE 2 DIABETES MELLITUS WITH HYPERGLYCEMIA, WITHOUT LONG-TERM CURRENT USE OF INSULIN (H): Primary | ICD-10-CM

## 2024-11-26 DIAGNOSIS — R20.2 PARESTHESIAS: ICD-10-CM

## 2024-11-26 DIAGNOSIS — E78.5 HYPERLIPIDEMIA LDL GOAL <70: ICD-10-CM

## 2024-11-26 DIAGNOSIS — K58.2 IRRITABLE BOWEL SYNDROME WITH BOTH CONSTIPATION AND DIARRHEA: ICD-10-CM

## 2024-11-26 DIAGNOSIS — E66.01 CLASS 3 SEVERE OBESITY DUE TO EXCESS CALORIES WITH SERIOUS COMORBIDITY AND BODY MASS INDEX (BMI) OF 45.0 TO 49.9 IN ADULT (H): ICD-10-CM

## 2024-11-26 PROCEDURE — 99215 OFFICE O/P EST HI 40 MIN: CPT | Mod: 95 | Performed by: FAMILY MEDICINE

## 2024-11-26 PROCEDURE — G2211 COMPLEX E/M VISIT ADD ON: HCPCS | Mod: 95 | Performed by: FAMILY MEDICINE

## 2024-11-26 PROCEDURE — 99417 PROLNG OP E/M EACH 15 MIN: CPT | Mod: 95 | Performed by: FAMILY MEDICINE

## 2024-11-26 RX ORDER — FAMOTIDINE 20 MG/1
20 TABLET, FILM COATED ORAL DAILY
COMMUNITY
Start: 2024-11-26

## 2024-11-26 ASSESSMENT — ASTHMA QUESTIONNAIRES
QUESTION_3 LAST FOUR WEEKS HOW OFTEN DID YOUR ASTHMA SYMPTOMS (WHEEZING, COUGHING, SHORTNESS OF BREATH, CHEST TIGHTNESS OR PAIN) WAKE YOU UP AT NIGHT OR EARLIER THAN USUAL IN THE MORNING: NOT AT ALL
ACT_TOTALSCORE: 25
ACT_TOTALSCORE: 25
QUESTION_1 LAST FOUR WEEKS HOW MUCH OF THE TIME DID YOUR ASTHMA KEEP YOU FROM GETTING AS MUCH DONE AT WORK, SCHOOL OR AT HOME: NONE OF THE TIME
QUESTION_4 LAST FOUR WEEKS HOW OFTEN HAVE YOU USED YOUR RESCUE INHALER OR NEBULIZER MEDICATION (SUCH AS ALBUTEROL): NOT AT ALL
QUESTION_5 LAST FOUR WEEKS HOW WOULD YOU RATE YOUR ASTHMA CONTROL: COMPLETELY CONTROLLED
QUESTION_2 LAST FOUR WEEKS HOW OFTEN HAVE YOU HAD SHORTNESS OF BREATH: NOT AT ALL

## 2024-11-26 ASSESSMENT — ANXIETY QUESTIONNAIRES
GAD7 TOTAL SCORE: 5
GAD7 TOTAL SCORE: 5
1. FEELING NERVOUS, ANXIOUS, OR ON EDGE: SEVERAL DAYS
7. FEELING AFRAID AS IF SOMETHING AWFUL MIGHT HAPPEN: NOT AT ALL
IF YOU CHECKED OFF ANY PROBLEMS ON THIS QUESTIONNAIRE, HOW DIFFICULT HAVE THESE PROBLEMS MADE IT FOR YOU TO DO YOUR WORK, TAKE CARE OF THINGS AT HOME, OR GET ALONG WITH OTHER PEOPLE: NOT DIFFICULT AT ALL
GAD7 TOTAL SCORE: 5
8. IF YOU CHECKED OFF ANY PROBLEMS, HOW DIFFICULT HAVE THESE MADE IT FOR YOU TO DO YOUR WORK, TAKE CARE OF THINGS AT HOME, OR GET ALONG WITH OTHER PEOPLE?: NOT DIFFICULT AT ALL
2. NOT BEING ABLE TO STOP OR CONTROL WORRYING: SEVERAL DAYS
5. BEING SO RESTLESS THAT IT IS HARD TO SIT STILL: NOT AT ALL
6. BECOMING EASILY ANNOYED OR IRRITABLE: SEVERAL DAYS
3. WORRYING TOO MUCH ABOUT DIFFERENT THINGS: SEVERAL DAYS
7. FEELING AFRAID AS IF SOMETHING AWFUL MIGHT HAPPEN: NOT AT ALL
4. TROUBLE RELAXING: SEVERAL DAYS

## 2024-11-26 ASSESSMENT — PATIENT HEALTH QUESTIONNAIRE - PHQ9
SUM OF ALL RESPONSES TO PHQ QUESTIONS 1-9: 1
SUM OF ALL RESPONSES TO PHQ QUESTIONS 1-9: 1
10. IF YOU CHECKED OFF ANY PROBLEMS, HOW DIFFICULT HAVE THESE PROBLEMS MADE IT FOR YOU TO DO YOUR WORK, TAKE CARE OF THINGS AT HOME, OR GET ALONG WITH OTHER PEOPLE: NOT DIFFICULT AT ALL

## 2024-11-26 NOTE — PROGRESS NOTES
April is a 44 year old who is being evaluated via a billable video visit.          Assessment & Plan   44 year old female  Diabetes  type 2, gastroesophageal reflux disease, MS with with chronic complex electric pain    1. Type 2 diabetes mellitus with hyperglycemia, without long-term current use of insulin (H) (Primary)  Plan: discussed the need for OV for Diabetes  twice a yr. Her last OV with me was 03/2023.  She is willing for follow up and appointment is made- she will get all labs completed at the same time.  Will complete over due foot exam- and she declines any concerns     - Hemoglobin A1c; Future  - Basic metabolic panel  (Ca, Cl, CO2, Creat, Gluc, K, Na, BUN); Future    Regarding post meals fatigue- need review of her amna and MTM consultation  Regarding post meal abdomen pain- will add ultrasound and hepatic profile for gall bladder pathology  2. Hyperlipidemia LDL goal <70  Plan: need fasting labs repeated and willing to complete then on the 10th of december  - Lipid Profile (Chol, Trig, HDL, LDL calc); Future    3. Class 3 severe obesity due to excess calories with serious comorbidity and body mass index (BMI) of 45.0 to 49.9 in adult (H)  On ozempic      4. Colon cancer screening  Options discussed and willing to get colonoscopy completed  - Colonoscopy Screening  Referral; Future    5. Multiple sclerosis (H)  6. Paresthesias  Plan: I acknowledge her on going symptoms of electric pain in post meals and I do not have any clear explanation. Her last OV with me was in 2023. Will review it further after her exam    7. Bipolar 2 disorder (HCC)  Patient reports no concerns  Has been in therapy and well managed  Stopped Wellbutrin > 1 yr and not needed    8. Irritable bowel syndrome with both constipation and diarrhea  Plan: ozempic does impact the Gastroenterology somewhat.has been able to tolerate it ok  Derneis constipation and need for fiber    9. Gastroesophageal reflux disease without  "esophagitis    - famotidine (PEPCID) 20 MG tablet; Take 1 tablet (20 mg) by mouth daily.    10. Need for vaccination  Discussed and will get them either at OV or at pharmacy.  Declined covid- due to infection in august            BMI  Estimated body mass index is 44.99 kg/m  as calculated from the following:    Height as of 12/27/23: 1.533 m (5' 0.35\").    Weight as of 6/11/24: 105.7 kg (233 lb 1.6 oz).   Weight management plan: Discussed healthy diet and exercise guidelines    The longitudinal plan of care for the diagnosis(es)/condition(s) as documented were addressed during this visit. Due to the added complexity in care, I will continue to support April in the subsequent management and with ongoing continuity of care.Ordering of each unique test  I spent a total of 69 minutes on the day of the visit.   Time spent by me today doing chart review, history and exam, documentation and further activities per the note      Subjective   April is a 44 year old, presenting for the following health issues:  No chief complaint on file.    HPI     Diabetes - type 2  Managed with lantus and ozempic  Averaging 10-15 units of lantus in pm which is when she eats large meal/dinner.  She does use amna .  No hypoglycemia  In am blood sugar is up to 170's.    She also wanted me to review the [employer] message she send to dr cloud- as an attachment     She is hoping to get MRI brain prior to seeing neurologist on dec 3rd    She was switched to the Aubagio in August& stopped the Copaxone as per neurology plan.    She has found Gabapentin & keppra to be the most effective medication as \"game changer\" due to decrease / reduction in spasm, electric twitching and zaps in her extremities, her face and her skull.  .  Unfortunately , her issues post meal is still very bothersome. She reports post meals she feels fatigue and electric spasm, mainly trying to digest food when its in upper part of her Gastroenterology and it has nothing " to do with gastroesophageal reflux disease or irritable bowel syndrome. These symptoms According to patient  are not related to ozempic as they are predated before starting ozempic        Review of Systems  Constitutional, neuro, ENT, endocrine, pulmonary, cardiac, gastrointestinal, genitourinary, musculoskeletal, integument and psychiatric systems are negative, except as otherwise noted.      Objective           Vitals:  No vitals were obtained today due to virtual visit.    Physical Exam   GENERAL: alert and no distress  EYES: Eyes grossly normal to inspection.  No discharge or erythema, or obvious scleral/conjunctival abnormalities.  RESP: No audible wheeze, cough, or visible cyanosis.    SKIN: Visible skin clear. No significant rash, abnormal pigmentation or lesions.  NEURO: Cranial nerves grossly intact.  Mentation and speech appropriate for age.  PSYCH: Appropriate affect, tone, and pace of words          Video-Visit Details    Type of service:  Video Visit   Originating Location (pt. Location): Home    Distant Location (provider location):  On-site  Platform used for Video Visit: Kiesha  Signed Electronically by: Shivani Clemons MD

## 2024-11-26 NOTE — PATIENT INSTRUCTIONS
1. Type 2 diabetes mellitus with hyperglycemia, without long-term current use of insulin (H) (Primary)  Plan: [employer]  recommend every 3 months appointment for Diabetes  check  Twice a year should be in office visit  Its great we did at least a virtual visit today [facility] 3, 2023 .    Will be able to review the A1c once completed on the 10th. I have changed the video visit slot for you to be accomdoated as office visit based on your work schedule    Am blood sugar in 170's is definitely related to large meals at dinner time  I am sure - you are watching diabetic diet  Keep MTM ap as well- they be able to review your blood sugar    - Hemoglobin A1c; Future  - Basic metabolic panel  (Ca, Cl, CO2, Creat, Gluc, K, Na, BUN); Future      Regarding electric shock in abdomen region and that too post meals sounds quite disabling  I will review them further at the time of exam    2. Hyperlipidemia LDL goal <70  - Lipid Profile (Chol, Trig, HDL, LDL calc); Future  No medications changes for above 2  Will review fasting lipid when completed and avaliable    Recent Labs   Lab Test 07/19/24  1548 11/07/23  1213   CHOL 174 212*   HDL 35* 47*   * 139*   TRIG 98 128      3. Continue with ozempic       4. Colon cancer screening  - Colonoscopy Screening  Referral; Future    5. Multiple sclerosis (H)  6. Paresthesias    Gastroesophageal reflux disease without esophagitis  - famotidine (PEPCID) 20 MG tablet; Take 1 tablet (20 mg) by mouth daily.    Need for vaccination

## 2024-12-03 ENCOUNTER — OFFICE VISIT (OUTPATIENT)
Dept: NEUROLOGY | Facility: CLINIC | Age: 44
End: 2024-12-03
Attending: PSYCHIATRY & NEUROLOGY
Payer: COMMERCIAL

## 2024-12-03 VITALS
HEART RATE: 78 BPM | DIASTOLIC BLOOD PRESSURE: 97 MMHG | HEIGHT: 62 IN | WEIGHT: 219.8 LBS | SYSTOLIC BLOOD PRESSURE: 137 MMHG | BODY MASS INDEX: 40.45 KG/M2 | OXYGEN SATURATION: 98 %

## 2024-12-03 DIAGNOSIS — G35 MULTIPLE SCLEROSIS (H): Primary | ICD-10-CM

## 2024-12-03 ASSESSMENT — PAIN SCALES - GENERAL: PAINLEVEL_OUTOF10: NO PAIN (0)

## 2024-12-03 NOTE — NURSING NOTE
Chief Complaint   Patient presents with    MS    RECHECK     6 month follow up      Vitals were taken and medications were reconciled.   Guanakito Warren, EMT  2:59 PM

## 2024-12-10 ENCOUNTER — LAB (OUTPATIENT)
Dept: LAB | Facility: CLINIC | Age: 44
End: 2024-12-10
Payer: COMMERCIAL

## 2024-12-10 ENCOUNTER — OFFICE VISIT (OUTPATIENT)
Dept: FAMILY MEDICINE | Facility: CLINIC | Age: 44
End: 2024-12-10
Payer: COMMERCIAL

## 2024-12-10 VITALS
SYSTOLIC BLOOD PRESSURE: 133 MMHG | RESPIRATION RATE: 16 BRPM | HEART RATE: 74 BPM | TEMPERATURE: 97.9 F | OXYGEN SATURATION: 97 % | DIASTOLIC BLOOD PRESSURE: 88 MMHG

## 2024-12-10 DIAGNOSIS — I10 HYPERTENSION GOAL BP (BLOOD PRESSURE) < 130/80: ICD-10-CM

## 2024-12-10 DIAGNOSIS — R10.84 GENERALIZED POSTPRANDIAL ABDOMINAL PAIN: ICD-10-CM

## 2024-12-10 DIAGNOSIS — J45.20 MILD INTERMITTENT ASTHMA WITHOUT COMPLICATION: ICD-10-CM

## 2024-12-10 DIAGNOSIS — E78.5 HYPERLIPIDEMIA LDL GOAL <70: ICD-10-CM

## 2024-12-10 DIAGNOSIS — E11.65 TYPE 2 DIABETES MELLITUS WITH HYPERGLYCEMIA, WITHOUT LONG-TERM CURRENT USE OF INSULIN (H): ICD-10-CM

## 2024-12-10 DIAGNOSIS — G35 MULTIPLE SCLEROSIS (H): ICD-10-CM

## 2024-12-10 DIAGNOSIS — E11.65 TYPE 2 DIABETES MELLITUS WITH HYPERGLYCEMIA, WITHOUT LONG-TERM CURRENT USE OF INSULIN (H): Primary | ICD-10-CM

## 2024-12-10 DIAGNOSIS — L29.9 ITCHING: ICD-10-CM

## 2024-12-10 DIAGNOSIS — Z30.41 FAMILY PLANNING, BCP (BIRTH CONTROL PILLS) MAINTENANCE: ICD-10-CM

## 2024-12-10 DIAGNOSIS — Z12.31 ENCOUNTER FOR SCREENING MAMMOGRAM FOR MALIGNANT NEOPLASM OF BREAST: ICD-10-CM

## 2024-12-10 DIAGNOSIS — Z12.11 SCREEN FOR COLON CANCER: ICD-10-CM

## 2024-12-10 LAB
BASOPHILS # BLD AUTO: 0 10E3/UL (ref 0–0.2)
BASOPHILS NFR BLD AUTO: 0 %
EOSINOPHIL # BLD AUTO: 0 10E3/UL (ref 0–0.7)
EOSINOPHIL NFR BLD AUTO: 1 %
ERYTHROCYTE [DISTWIDTH] IN BLOOD BY AUTOMATED COUNT: 12.6 % (ref 10–15)
EST. AVERAGE GLUCOSE BLD GHB EST-MCNC: 140 MG/DL
HBA1C MFR BLD: 6.5 % (ref 0–5.6)
HCT VFR BLD AUTO: 40.5 % (ref 35–53)
HGB BLD-MCNC: 13.3 G/DL (ref 11.7–17.7)
IMM GRANULOCYTES # BLD: 0 10E3/UL
IMM GRANULOCYTES NFR BLD: 0 %
LYMPHOCYTES # BLD AUTO: 2.4 10E3/UL (ref 0.8–5.3)
LYMPHOCYTES NFR BLD AUTO: 34 %
MCH RBC QN AUTO: 27.8 PG (ref 26.5–33)
MCHC RBC AUTO-ENTMCNC: 32.8 G/DL (ref 31.5–36.5)
MCV RBC AUTO: 85 FL (ref 78–100)
MONOCYTES # BLD AUTO: 0.5 10E3/UL (ref 0–1.3)
MONOCYTES NFR BLD AUTO: 7 %
NEUTROPHILS # BLD AUTO: 4.2 10E3/UL (ref 1.6–8.3)
NEUTROPHILS NFR BLD AUTO: 58 %
PLATELET # BLD AUTO: 351 10E3/UL (ref 150–450)
RBC # BLD AUTO: 4.79 10E6/UL (ref 3.8–5.9)
WBC # BLD AUTO: 7.1 10E3/UL (ref 4–11)

## 2024-12-10 PROCEDURE — 83690 ASSAY OF LIPASE: CPT

## 2024-12-10 PROCEDURE — 85025 COMPLETE CBC W/AUTO DIFF WBC: CPT

## 2024-12-10 PROCEDURE — 90471 IMMUNIZATION ADMIN: CPT | Performed by: FAMILY MEDICINE

## 2024-12-10 PROCEDURE — 83036 HEMOGLOBIN GLYCOSYLATED A1C: CPT

## 2024-12-10 PROCEDURE — 80053 COMPREHEN METABOLIC PANEL: CPT

## 2024-12-10 PROCEDURE — 99207 PR FOOT EXAM NO CHARGE: CPT | Performed by: FAMILY MEDICINE

## 2024-12-10 PROCEDURE — 80061 LIPID PANEL: CPT

## 2024-12-10 PROCEDURE — 99417 PROLNG OP E/M EACH 15 MIN: CPT | Performed by: FAMILY MEDICINE

## 2024-12-10 PROCEDURE — 99215 OFFICE O/P EST HI 40 MIN: CPT | Mod: 25 | Performed by: FAMILY MEDICINE

## 2024-12-10 PROCEDURE — 82248 BILIRUBIN DIRECT: CPT

## 2024-12-10 PROCEDURE — 90746 HEPB VACCINE 3 DOSE ADULT IM: CPT | Performed by: FAMILY MEDICINE

## 2024-12-10 PROCEDURE — 84443 ASSAY THYROID STIM HORMONE: CPT

## 2024-12-10 PROCEDURE — 36415 COLL VENOUS BLD VENIPUNCTURE: CPT

## 2024-12-10 PROCEDURE — 82150 ASSAY OF AMYLASE: CPT

## 2024-12-10 RX ORDER — LISINOPRIL 2.5 MG/1
2.5 TABLET ORAL DAILY
Qty: 90 TABLET | Refills: 1 | Status: SHIPPED | OUTPATIENT
Start: 2024-12-10

## 2024-12-10 RX ORDER — NORGESTREL AND ETHINYL ESTRADIOL 0.3-0.03MG
1 KIT ORAL EVERY EVENING
Qty: 84 TABLET | Refills: 3 | Status: SHIPPED | OUTPATIENT
Start: 2024-12-10

## 2024-12-10 RX ORDER — ATORVASTATIN CALCIUM 20 MG/1
20 TABLET, FILM COATED ORAL DAILY
Qty: 90 TABLET | Refills: 1 | Status: SHIPPED | OUTPATIENT
Start: 2024-12-10

## 2024-12-10 RX ORDER — ATORVASTATIN CALCIUM 20 MG/1
20 TABLET, FILM COATED ORAL DAILY
Qty: 90 TABLET | Refills: 1 | Status: SHIPPED | OUTPATIENT
Start: 2024-12-10 | End: 2024-12-10

## 2024-12-10 RX ORDER — METFORMIN HYDROCHLORIDE 500 MG/1
2000 TABLET, EXTENDED RELEASE ORAL EVERY EVENING
Qty: 360 TABLET | Refills: 1 | Status: SHIPPED | OUTPATIENT
Start: 2024-12-10

## 2024-12-10 ASSESSMENT — ANXIETY QUESTIONNAIRES
GAD7 TOTAL SCORE: 6
4. TROUBLE RELAXING: SEVERAL DAYS
1. FEELING NERVOUS, ANXIOUS, OR ON EDGE: SEVERAL DAYS
IF YOU CHECKED OFF ANY PROBLEMS ON THIS QUESTIONNAIRE, HOW DIFFICULT HAVE THESE PROBLEMS MADE IT FOR YOU TO DO YOUR WORK, TAKE CARE OF THINGS AT HOME, OR GET ALONG WITH OTHER PEOPLE: NOT DIFFICULT AT ALL
GAD7 TOTAL SCORE: 6
8. IF YOU CHECKED OFF ANY PROBLEMS, HOW DIFFICULT HAVE THESE MADE IT FOR YOU TO DO YOUR WORK, TAKE CARE OF THINGS AT HOME, OR GET ALONG WITH OTHER PEOPLE?: NOT DIFFICULT AT ALL
3. WORRYING TOO MUCH ABOUT DIFFERENT THINGS: SEVERAL DAYS
6. BECOMING EASILY ANNOYED OR IRRITABLE: SEVERAL DAYS
2. NOT BEING ABLE TO STOP OR CONTROL WORRYING: SEVERAL DAYS
7. FEELING AFRAID AS IF SOMETHING AWFUL MIGHT HAPPEN: SEVERAL DAYS
5. BEING SO RESTLESS THAT IT IS HARD TO SIT STILL: NOT AT ALL
7. FEELING AFRAID AS IF SOMETHING AWFUL MIGHT HAPPEN: SEVERAL DAYS
GAD7 TOTAL SCORE: 6

## 2024-12-10 ASSESSMENT — ASTHMA QUESTIONNAIRES: ACT_TOTALSCORE: 25

## 2024-12-10 NOTE — PROGRESS NOTES
Assessment & Plan     1. Type 2 diabetes mellitus with hyperglycemia, without long-term current use of insulin (H) (Primary)  Plan: well controlled Diabetes      Meds refill given   - metFORMIN (GLUCOPHAGE XR) 500 MG 24 hr tablet; Take 4 tablets (2,000 mg) by mouth every evening.  Dispense: 360 tablet; Refill: 1  - Semaglutide, 1 MG/DOSE, (OZEMPIC) 4 MG/3ML pen; Inject 1 mg subcutaneously every 7 days.  Dispense: 9 mL; Refill: 1  - insulin glargine (LANTUS PEN) 100 UNIT/ML pen; Inject 15 Units subcutaneously at bedtime.  Dispense: 15 mL; Refill: 0  -annual  FOOT EXAM completed 12/10/24     Continue with ace and arbs   Follow up for Diabetes  as OV every 3 months    - lisinopril (ZESTRIL) 2.5 MG tablet; Take 1 tablet (2.5 mg) by mouth daily.  Dispense: 90 tablet; Refill: 1  - atorvastatin (LIPITOR) 20 MG tablet; Take 1 tablet (20 mg) by mouth daily.  Dispense: 90 tablet; Refill: 1    2. Hypertension goal BP (blood pressure) < 130/80  Plan: discussed target Blood pressure and lisinopril dose in detail.  She feels in past higher doses caused side effects  Continue lisinopril 2.5 mg once daily  If BP not at target- then ok to take 2 tabs of 2.5 mg once daily and monitor how well is the dose tolerated and ensure Blood pressure at target.  Send [employer] message to me to change the dose from  2.5 mg to 5 mg once daily     3. Hyperlipidemia LDL goal <70  Plan: refill given . Fasting lipid pending today  - atorvastatin (LIPITOR) 20 MG tablet; Take 1 tablet (20 mg) by mouth daily.  Dispense: 90 tablet; Refill: 1    4. Itching  Plan: post meal itching- of undetermined cause  Advised to take over the counter antihistamine & monitor symptoms    5. Multiple sclerosis (H)  Plan: - considered this problem when making today's plans  - reviewed most recent consultation with patient        -followed by specialist. Has appointment with neurologist-ophthalmologist- to review eye symptoms as listed in in history today     6. Family  planning, BCP (birth control pills) maintenance  Plan: she has missed periods in NOV- most likely because of inconsitent oral contraceptive pills - missed pills  Possibility of pregnancy discussed with patient- and she declined- not sexually active for > 2 months  Its best to do home urine pregnancy test and resume oral contraceptive pills once daily/ consistently. Monitor periods and if concerns about missed period unresolved follow up next month in the clinic  - norgestrel-ethinyl estradiol (LOW-OGESTREL) 0.3-30 MG-MCG tablet; Take 1 tablet by mouth every evening. TAKE 1 TABLET BY MOUTH EVERY DAY.  Dispense: 84 tablet; Refill: 3    7. Encounter for screening mammogram for malignant neoplasm of breast  Advised annual mammogram and its over due - MA Screen Bilateral w/Donald; Future    8. Screen for colon cancer  Options for screening dicussed and she is willing to complete colonoscopy   - Colonoscopy Screening  Referral; Future      MH  Mood disorder  Patient reports no concerns      6/20/2023     1:10 PM 7/20/2023     1:53 PM 11/26/2024     4:51 PM   PHQ   PHQ-9 Total Score 13 8 1    Q9: Thoughts of better off dead/self-harm past 2 weeks Not at all  Not at all Not at all        Patient-reported          7/20/2023     1:53 PM 11/26/2024     4:53 PM 12/10/2024     4:20 PM   SHANA-7 SCORE   Total Score  5 (mild anxiety) 6 (mild anxiety)   Total Score 8 5  6        Patient-reported          Use of albuterol  Patient reports no concerns with asthma      Ordering of each unique test  Prescription drug management  I spent a total of 55 minutes on the day of the visit.   Time spent by me today doing chart review, history and exam, documentation and further activities per the note    The longitudinal plan of care for the diagnosis(es)/condition(s) as documented were addressed during this visit. Due to the added complexity in care, I will continue to support April in the subsequent management and with ongoing  "continuity of care.        Subjective   April is a 44 year old, presenting for the following health issues:  Diabetes        12/10/2024     4:25 PM   Additional Questions   Roomed by Keshia SANDERS MA   Accompanied by self         12/10/2024     4:25 PM   Patient Reported Additional Medications   Patient reports taking the following new medications none     History of Present Illness       Diabetes:   April presents for follow up of diabetes.   April is checking home blood glucose with a continuous glucose monitor.   April checks blood glucose before meals, after meals, before and after meals and at bedtime.  Blood glucose is sometimes over 200 and never under 70.  When April's blood glucose is low, the patient is asymptomatic for confusion, blurred vision, lethargy and reports not feeling dizzy, shaky, or weak.  April is concerned about blood sugar frequently over 200.    April is not experiencing numbness or burning in feet, excessive thirst, blurry vision, weight changes or redness, sores or blisters on feet.           April eats 0-1 servings of fruits and vegetables daily.April consumes 0 sweetened beverage(s) daily.April exercises with enough effort to increase April's heart rate 9 or less minutes per day.  April exercises with enough effort to increase April's heart rate 3 or less days per week. April is missing 1 dose(s) of medications per week.  Diabetes -CGM  Happy to see A1c improved.  Wants to continue semaglutide and lantus along with metformin  Concerns about following symptoms discussed in visit today   \"energy running through my body, through a central channel up to my brain   Lived \"  \" Filed  of vision- has colored energy waves coming out towards the center-\"  has lived with it- she reports keppra has helped  Reviewed above with neurology- and was advised to see neurophalmologist  she feels above symptoms are intense post meals, sometimes associted with generalized body itching  Above symptoms were "   heightened since  2016  In 2016- she was diagnosed and treated for BPD.  Now on Lamictal and stable. April denies suicidal thoughts or ideation.reports no side effects from medications. Would like to continue.        Diabetes - stable  Semaglutide weekly   Eats once a day-usually between 6-7 pm and feels post meal- energy is stuck & the rate of energy is higher and laying down helps   Also itchy post meals and also in armpit    Missed periods in NOV  Has been on oral contraceptive pills- but misses oral contraceptive pills  Wondering if perimenopuase  Willing to take oral contraceptive pills consisteently     Abdomen ultrasound dejan  Generalzied itching  Right armpit- LN- 2 weeks           Objective    /88   Pulse 74   Temp 97.9  F (36.6  C) (Temporal)   Resp 16   SpO2 97%   There is no height or weight on file to calculate BMI.  Physical Exam   GENERAL: alert and no distress  EYES: Eyes grossly normal to inspection, PERRL and conjunctivae and sclerae normal  HENT: ear canals and TM's normal, nose and mouth without ulcers or lesions  NECK: no adenopathy, no asymmetry, masses, or scars  RESP: lungs clear to auscultation - no rales, rhonchi or wheezes  CV: regular rate and rhythm, normal S1 S2, no S3 or S4, no murmur, click or rub, no peripheral edema  ABDOMEN: soft, nontender, no hepatosplenomegaly, no masses and bowel sounds normal  MS: no gross musculoskeletal defects noted, no edema  SKIN: no suspicious lesions or rashes  NEURO: Normal strength and tone, mentation intact and speech normal  PSYCH: mentation appears normal, affect normal/bright    Results for orders placed or performed in visit on 12/10/24 (from the past 24 hours)   Hemoglobin A1c   Result Value Ref Range    Estimated Average Glucose 140 (H) <117 mg/dL    Hemoglobin A1C 6.5 (H) 0.0 - 5.6 %   CBC with platelets differential    Narrative    The following orders were created for panel order CBC with platelets differential.  Procedure          "                      Abnormality         Status                     ---------                               -----------         ------                     CBC with platelets and d...[[id number]]                      Final result                 Please view results for these tests on the individual orders.   CBC with platelets and differential   Result Value Ref Range    WBC Count 7.1 4.0 - 11.0 10e3/uL    RBC Count 4.79 3.80 - 5.90 10e6/uL    Hemoglobin 13.3 11.7 - 17.7 g/dL    Hematocrit 40.5 35.0 - 53.0 %    MCV 85 78 - 100 fL    MCH 27.8 26.5 - 33.0 pg    MCHC 32.8 31.5 - 36.5 g/dL    RDW 12.6 10.0 - 15.0 %    Platelet Count 351 150 - 450 10e3/uL    % Neutrophils 58 %    % Lymphocytes 34 %    % Monocytes 7 %    % Eosinophils 1 %    % Basophils 0 %    % Immature Granulocytes 0 %    Absolute Neutrophils 4.2 1.6 - 8.3 10e3/uL    Absolute Lymphocytes 2.4 0.8 - 5.3 10e3/uL    Absolute Monocytes 0.5 0.0 - 1.3 10e3/uL    Absolute Eosinophils 0.0 0.0 - 0.7 10e3/uL    Absolute Basophils 0.0 0.0 - 0.2 10e3/uL    Absolute Immature Granulocytes 0.0 <=0.4 10e3/uL    Narrative    The generation of reference intervals for this test is currently based on binary male or female sex. If the electronic health record information indicates another gender identity or if Legal Sex is recorded as \"Unknown\", both male and female reference intervals are provided where applicable, and should be considered according to the individual's appropriate clinical context.           Signed Electronically by: Shivani Clemons MD    "

## 2024-12-10 NOTE — PATIENT INSTRUCTIONS
1. Type 2 diabetes mellitus with hyperglycemia, without long-term current use of insulin (H) (Primary)  Plan: well controled Diabetes - and no change in medications      - metFORMIN (GLUCOPHAGE XR) 500 MG 24 hr tablet; Take 4 tablets (2,000 mg) by mouth every evening.  Dispense: 360 tablet; Refill: 1  - Semaglutide, 1 MG/DOSE, (OZEMPIC) 4 MG/3ML pen; Inject 1 mg subcutaneously every 7 days.  Dispense: 9 mL; Refill: 1  - insulin glargine (LANTUS PEN) 100 UNIT/ML pen; Inject 15 Units subcutaneously at bedtime.  Dispense: 15 mL; Refill: 0  - FOOT EXAM- no signs of neuropathy and that's great  Annual eye exam  Return office visit for Diabetes  in 3 months        Hypertension goal BP (blood pressure) < 130/80  Continue lisinopril 2.5 mg once daily  If BP not at target- then ok to take 2 tabs of 2.5 mg once daily and monitor how well is the dose tolerated and ensure Blood pressure at target.  Send [employer] message to me to change the dose from  2.5 mg to 5 mg once daily   - lisinopril (ZESTRIL) 2.5 MG tablet; Take 1 tablet (2.5 mg) by mouth daily.  Dispense: 90 tablet; Refill: 1    Hyperlipidemia LDL goal <70            Multiple sclerosis (H)  As per neurologist          Family planning, BCP (birth control pills) maintenance  Please resume oral contraceptive pills consistently and monitor if periods remain regular  - norgestrel-ethinyl estradiol (LOW-OGESTREL) 0.3-30 MG-MCG tablet; Take 1 tablet by mouth every evening. TAKE 1 TABLET BY MOUTH EVERY DAY.  Dispense: 84 tablet; Refill: 3    Encounter for screening mammogram for malignant neoplasm of breast  Please proceed with mammogram      Screen for colon cancer  Please proceed with colonoscopy  - Colonoscopy Screening  Referral; Future    8. Itching  Please use over the counter antihistamine either once daily as needed - and monitor if post meal itching improves  It can be generic claritin, zyrtec or allegra

## 2024-12-11 ENCOUNTER — PATIENT OUTREACH (OUTPATIENT)
Dept: CARE COORDINATION | Facility: CLINIC | Age: 44
End: 2024-12-11
Payer: COMMERCIAL

## 2024-12-11 LAB
ALBUMIN SERPL BCG-MCNC: 4 G/DL (ref 3.5–5.2)
ALP SERPL-CCNC: 97 U/L (ref 40–150)
ALT SERPL W P-5'-P-CCNC: 13 U/L (ref 0–70)
AMYLASE SERPL-CCNC: 34 U/L (ref 28–100)
ANION GAP SERPL CALCULATED.3IONS-SCNC: 13 MMOL/L (ref 7–15)
AST SERPL W P-5'-P-CCNC: 18 U/L (ref 0–45)
BILIRUB DIRECT SERPL-MCNC: <0.2 MG/DL (ref 0–0.3)
BILIRUB SERPL-MCNC: 0.2 MG/DL
BUN SERPL-MCNC: 11.8 MG/DL (ref 6–20)
CALCIUM SERPL-MCNC: 9.2 MG/DL (ref 8.8–10.4)
CHLORIDE SERPL-SCNC: 102 MMOL/L (ref 98–107)
CHOLEST SERPL-MCNC: 192 MG/DL
CREAT SERPL-MCNC: 0.58 MG/DL (ref 0.51–1.17)
EGFRCR SERPLBLD CKD-EPI 2021: >90 ML/MIN/1.73M2
FASTING STATUS PATIENT QL REPORTED: YES
FASTING STATUS PATIENT QL REPORTED: YES
GLUCOSE SERPL-MCNC: 107 MG/DL (ref 70–99)
HCO3 SERPL-SCNC: 22 MMOL/L (ref 22–29)
HDLC SERPL-MCNC: 41 MG/DL
LDLC SERPL CALC-MCNC: 124 MG/DL
LIPASE SERPL-CCNC: 43 U/L (ref 13–60)
NONHDLC SERPL-MCNC: 151 MG/DL
POTASSIUM SERPL-SCNC: 4.1 MMOL/L (ref 3.4–5.3)
PROT SERPL-MCNC: 7.2 G/DL (ref 6.4–8.3)
SODIUM SERPL-SCNC: 137 MMOL/L (ref 135–145)
TRIGL SERPL-MCNC: 135 MG/DL
TSH SERPL DL<=0.005 MIU/L-ACNC: 1.46 UIU/ML (ref 0.3–4.2)

## 2024-12-17 ENCOUNTER — TELEPHONE (OUTPATIENT)
Dept: FAMILY MEDICINE | Facility: CLINIC | Age: 44
End: 2024-12-17
Payer: COMMERCIAL

## 2024-12-17 NOTE — TELEPHONE ENCOUNTER
A.S,     Imaging staff called to inquire if Abdominal US order is for Mesenteric arteries to rule out MALS, or if it's for Liver, gallbladder and kidney studies (appt is scheduled on 12/23). Please advise.     Triage team,   Imaging can be reach at [phone].     Thank you,  Rosalie SYED RN

## 2024-12-18 NOTE — TELEPHONE ENCOUNTER
Called imaging team at # provided below  Confirmed focus on liver and gallbladder.   Team expressed understanding.     Quyen WATTS RN     yes

## 2024-12-24 ENCOUNTER — MYC MEDICAL ADVICE (OUTPATIENT)
Dept: FAMILY MEDICINE | Facility: CLINIC | Age: 44
End: 2024-12-24
Payer: COMMERCIAL

## 2024-12-30 ENCOUNTER — TELEPHONE (OUTPATIENT)
Dept: GASTROENTEROLOGY | Facility: CLINIC | Age: 44
End: 2024-12-30

## 2024-12-30 ENCOUNTER — ANCILLARY PROCEDURE (OUTPATIENT)
Dept: ULTRASOUND IMAGING | Facility: CLINIC | Age: 44
End: 2024-12-30
Attending: FAMILY MEDICINE
Payer: COMMERCIAL

## 2024-12-30 DIAGNOSIS — R10.84 GENERALIZED POSTPRANDIAL ABDOMINAL PAIN: ICD-10-CM

## 2024-12-30 PROCEDURE — 76700 US EXAM ABDOM COMPLETE: CPT | Mod: GC | Performed by: RADIOLOGY

## 2024-12-30 NOTE — TELEPHONE ENCOUNTER
"THE PATIENT WAS NOT AWARE OF THE OZEMPIC HOLD TIME LENGTH AND IS NOT COMFORTABLE HOLDING THE MEDICATION THAT LONG AT THIS TIME.      THE PATIENT WILL DISCUSS THIS WITH THE ORDERING PROVIDER FIRST.     THE WRITER IS SENDING ENDOSCOPY SCHEDULING INFORMATION VIA [employer]. THE PATIENT IS AWARE  THAT THE REFERRAL IS VALID INTO DECEMBER 2025.     Endoscopy Scheduling Screen    Have you had any respiratory illness or flu-like symptoms in the last 10 days?  Yes (Schedule at least 10 days from symptom onset)  Cold, started Friday 27th December    What is your communication preference for Instructions and/or Bowel Prep?   [employer]      What insurance is in the chart?  Other:  BCBS/BP    Ordering/Referring Provider: EMMA MAIER   (If ordering provider performs procedure, schedule with ordering provider unless otherwise instructed. )    BMI: Estimated body mass index is 40.2 kg/m  as calculated from the following:    Height as of 12/3/24: 1.575 m (5' 2\").    Weight as of 12/3/24: 99.7 kg (219 lb 12.8 oz).       Sedation Ordered  moderate sedation.   If patient BMI > 50 do not schedule in [facility].    If patient BMI > 45 do not schedule at [facility].    Are you taking methadone or Suboxone?  NO, No RN review required.    Have you been diagnosed and are being treated for severe PTSD or severe anxiety?  NO, No RN review required.    Are you taking any prescription medications for pain 3 or more times per week?   NO, No RN review required.  gabapentin      Do you have a history of malignant hyperthermia?  No      (Females) Are you currently pregnant?   No       Have you been diagnosed or told you have pulmonary hypertension?   No      Do you have an LVAD?  No      Have you been told you have moderate to severe sleep apnea?  No.      Have you been told you have COPD, asthma, or any other lung disease?  Yes     What breathing problems do you have?  Asthma     Do you use home oxygen?  No    Have your breathing problems required an ED visit or " "hospitalization in the last year?  No.      Do you have any heart conditions?  No       Have you ever had or are you waiting for an organ transplant?  No. Continue scheduling, no site restrictions.      Have you had a stroke or transient ischemic attack (TIA aka \"mini stroke\" in the last 6 months?   No      Have you been diagnosed with or been told you have cirrhosis of the liver?   No.      Are you currently on dialysis?   No      Do you need assistance transferring?   No    BMI: Estimated body mass index is 40.2 kg/m  as calculated from the following:    Height as of 12/3/24: 1.575 m (5' 2\").    Weight as of 12/3/24: 99.7 kg (219 lb 12.8 oz).     Is patients BMI > 40 and scheduling location [facility]?  Yes (If MAC sedation is ordered, schedule PAC eval)      Do you take an injectable or oral medication for weight loss or diabetes (excluding insulin)?  Yes, hold time can be up to 7 days. Please consult with you prescribing provider to discuss endoscopy recommendations. (Please schedule at least 7 days out.)  OZEMPIC        "

## 2025-03-19 ENCOUNTER — VIRTUAL VISIT (OUTPATIENT)
Dept: FAMILY MEDICINE | Facility: CLINIC | Age: 45
End: 2025-03-19
Payer: COMMERCIAL

## 2025-03-19 ENCOUNTER — HOSPITAL ENCOUNTER (EMERGENCY)
Facility: CLINIC | Age: 45
Discharge: HOME | End: 2025-03-19
Attending: EMERGENCY MEDICINE | Admitting: EMERGENCY MEDICINE
Payer: COMMERCIAL

## 2025-03-19 ENCOUNTER — MYC MEDICAL ADVICE (OUTPATIENT)
Dept: FAMILY MEDICINE | Facility: CLINIC | Age: 45
End: 2025-03-19

## 2025-03-19 ENCOUNTER — TELEPHONE (OUTPATIENT)
Dept: FAMILY MEDICINE | Facility: CLINIC | Age: 45
End: 2025-03-19

## 2025-03-19 VITALS
DIASTOLIC BLOOD PRESSURE: 131 MMHG | SYSTOLIC BLOOD PRESSURE: 177 MMHG | RESPIRATION RATE: 18 BRPM | OXYGEN SATURATION: 99 % | BODY MASS INDEX: 38.28 KG/M2 | HEART RATE: 83 BPM | TEMPERATURE: 96.4 F | HEIGHT: 62 IN | WEIGHT: 208 LBS

## 2025-03-19 DIAGNOSIS — G35 MULTIPLE SCLEROSIS (H): ICD-10-CM

## 2025-03-19 DIAGNOSIS — E11.65 TYPE 2 DIABETES MELLITUS WITH HYPERGLYCEMIA, WITHOUT LONG-TERM CURRENT USE OF INSULIN (H): ICD-10-CM

## 2025-03-19 DIAGNOSIS — G51.31 HEMIFACIAL SPASM OF RIGHT SIDE OF FACE: Primary | ICD-10-CM

## 2025-03-19 DIAGNOSIS — G25.3 MYOCLONIC JERKING: ICD-10-CM

## 2025-03-19 PROBLEM — Z12.11 SCREEN FOR COLON CANCER: Status: ACTIVE | Noted: 2025-03-19

## 2025-03-19 LAB
ALBUMIN SERPL BCG-MCNC: 3.9 G/DL (ref 3.5–5.2)
ALBUMIN UR-MCNC: NEGATIVE MG/DL
ALP SERPL-CCNC: 95 U/L (ref 40–150)
ALT SERPL W P-5'-P-CCNC: 14 U/L (ref 0–70)
ANION GAP SERPL CALCULATED.3IONS-SCNC: 12 MMOL/L (ref 7–15)
APPEARANCE UR: CLEAR
AST SERPL W P-5'-P-CCNC: 25 U/L (ref 0–45)
BILIRUB SERPL-MCNC: 0.3 MG/DL
BILIRUB UR QL STRIP: NEGATIVE
BUN SERPL-MCNC: 8.4 MG/DL (ref 6–20)
CALCIUM SERPL-MCNC: 9.2 MG/DL (ref 8.8–10.4)
CHLORIDE SERPL-SCNC: 101 MMOL/L (ref 98–107)
COLOR UR AUTO: ABNORMAL
CREAT SERPL-MCNC: 0.6 MG/DL (ref 0.51–1.17)
EGFRCR SERPLBLD CKD-EPI 2021: >90 ML/MIN/1.73M2
GLUCOSE SERPL-MCNC: 101 MG/DL (ref 70–99)
GLUCOSE UR STRIP-MCNC: NEGATIVE MG/DL
HCO3 SERPL-SCNC: 25 MMOL/L (ref 22–29)
HGB UR QL STRIP: ABNORMAL
KETONES UR STRIP-MCNC: 10 MG/DL
LEUKOCYTE ESTERASE UR QL STRIP: NEGATIVE
LIPASE SERPL-CCNC: 19 U/L (ref 13–60)
MAGNESIUM SERPL-MCNC: 2.1 MG/DL (ref 1.7–2.3)
MUCOUS THREADS #/AREA URNS LPF: PRESENT /LPF
NITRATE UR QL: NEGATIVE
PH UR STRIP: 6 [PH] (ref 5–7)
POTASSIUM SERPL-SCNC: 4.3 MMOL/L (ref 3.4–5.3)
PROT SERPL-MCNC: 7 G/DL (ref 6.4–8.3)
RBC URINE: 34 /HPF
SODIUM SERPL-SCNC: 138 MMOL/L (ref 135–145)
SP GR UR STRIP: 1.01 (ref 1–1.03)
SQUAMOUS EPITHELIAL: 2 /HPF
TSH SERPL DL<=0.005 MIU/L-ACNC: 1.05 UIU/ML (ref 0.3–4.2)
UROBILINOGEN UR STRIP-MCNC: NORMAL MG/DL
WBC URINE: 2 /HPF

## 2025-03-19 PROCEDURE — 93005 ELECTROCARDIOGRAM TRACING: CPT

## 2025-03-19 PROCEDURE — 99284 EMERGENCY DEPT VISIT MOD MDM: CPT | Mod: 25

## 2025-03-19 PROCEDURE — 96374 THER/PROPH/DIAG INJ IV PUSH: CPT

## 2025-03-19 PROCEDURE — 250N000011 HC RX IP 250 OP 636: Performed by: EMERGENCY MEDICINE

## 2025-03-19 PROCEDURE — 258N000003 HC RX IP 258 OP 636: Performed by: EMERGENCY MEDICINE

## 2025-03-19 PROCEDURE — 98006 SYNCH AUDIO-VIDEO EST MOD 30: CPT | Performed by: FAMILY MEDICINE

## 2025-03-19 RX ORDER — DIAZEPAM 5 MG/1
5-10 TABLET ORAL EVERY 12 HOURS PRN
Qty: 20 TABLET | Refills: 0 | Status: SHIPPED | OUTPATIENT
Start: 2025-03-19 | End: 2025-03-19

## 2025-03-19 RX ORDER — DIAZEPAM 10 MG/2ML
2.5 INJECTION, SOLUTION INTRAMUSCULAR; INTRAVENOUS
Status: DISCONTINUED | OUTPATIENT
Start: 2025-03-19 | End: 2025-03-20 | Stop reason: HOSPADM

## 2025-03-19 RX ORDER — DIAZEPAM 5 MG/1
5 TABLET ORAL EVERY 12 HOURS PRN
Qty: 20 TABLET | Refills: 0 | Status: SHIPPED | OUTPATIENT
Start: 2025-03-19

## 2025-03-19 RX ORDER — DIAZEPAM 10 MG/2ML
5 INJECTION, SOLUTION INTRAMUSCULAR; INTRAVENOUS ONCE
Status: COMPLETED | OUTPATIENT
Start: 2025-03-19 | End: 2025-03-19

## 2025-03-19 RX ADMIN — DIAZEPAM 5 MG: 5 INJECTION INTRAMUSCULAR; INTRAVENOUS at 18:17

## 2025-03-19 RX ADMIN — SODIUM CHLORIDE 1000 ML: 0.9 INJECTION, SOLUTION INTRAVENOUS at 18:23

## 2025-03-19 ASSESSMENT — ACTIVITIES OF DAILY LIVING (ADL)
ADLS_ACUITY_SCORE: 41

## 2025-03-19 ASSESSMENT — COLUMBIA-SUICIDE SEVERITY RATING SCALE - C-SSRS
2. HAVE YOU ACTUALLY HAD ANY THOUGHTS OF KILLING YOURSELF IN THE PAST MONTH?: NO
1. IN THE PAST MONTH, HAVE YOU WISHED YOU WERE DEAD OR WISHED YOU COULD GO TO SLEEP AND NOT WAKE UP?: NO
6. HAVE YOU EVER DONE ANYTHING, STARTED TO DO ANYTHING, OR PREPARED TO DO ANYTHING TO END YOUR LIFE?: NO

## 2025-03-19 NOTE — PROGRESS NOTES
"April is a 45 year old who is being evaluated via a billable video visit.    How would you like to obtain your AVS? MyChart  If the video visit is dropped, the invitation should be resent by:   Will anyone else be joining your video visit? 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  Visible right hemispasm (slow tic) that comes on regular intervals.  She states this has been worse since 5-6 days ago.  Discussed the discomfort and will try to increase gabapentin from 300mg TID to 400mg TID  Also discussed if this was MS flare could consider prednisone but patient concerned about her diabetes however last A1c and BS are looking good.  She does have a neurologist and she is awaiting a response from them.  I will route this message to them ( I am not patients PCP but just a covering provider).   Question about how long for prednisone and dosing and whether or not imaging would be helpful at this time.      After her visit she called back and said things were getting worse -   Advised to go to ER for further evaluation    Multiple sclerosis (H)  As above     Type 2 diabetes mellitus with hyperglycemia, without long-term current use of insulin (H)  Controlled with lantus, metformin and ozempic               Subjective   April is a 45 year old, presenting for the following health issues:  Musculoskeletal Problem    Musculoskeletal Problem    History of Present Illness       Reason for visit:  Electical shocks from stomach to neck and head.April exercises with enough effort to increase April's heart rate 9 or less minutes per day.  April exercises with enough effort to increase April's heart rate 3 or less days per week.   April is taking medications regularly.      Having shocks from stomach up to the face and it is hitting a nerve in her right face.   Will get gavin in the right face- right side with " visible spasm  Middle of doctoral exam and is stressed currently  Taking gabapentin and the Keppra  Increased her dose gabapentin from 2 times and day and increased to 3 times a day  Will get facial spasm - pain is 3 out of 10  In the past when not on gabapentin and Keppra it was more painful  Normally it will subside but this flare has not - persisting.     Symptoms acutely worse the end of last week so approximately 5-6 days ago.      Has had flares before and it gets worse in the afternoon -   Typically if she would relax it would improve after 30-60 minutes  Symptoms improve during the night and AM is better     In the past took prednisone but concerned because of diabetes    Has a CGM and it has been stable  Taking lantus and ozempic and metformin     Next MRI brain is due in June   Switched MS drugs in the Fall  Does follow with neurology for multiple sclerosis             Review of Systems  Constitutional, HEENT, cardiovascular, pulmonary, GI, , musculoskeletal, neuro, skin, endocrine and psych systems are negative, except as otherwise noted.      Objective           Vitals:  No vitals were obtained today due to virtual visit.    Physical Exam   GENERAL: alert and no distress  EYES: Eyes grossly normal to inspection.  No discharge or erythema, or obvious scleral/conjunctival abnormalities.  RESP: No audible wheeze, cough, or visible cyanosis.    SKIN: Visible skin clear. No significant rash, abnormal pigmentation or lesions.  NEURO visible right hemispasm of facial muscles - lasting a few seconds and occurring at regular interval  PSYCH: Appropriate affect, tone, and pace of words          Video-Visit Details    Type of service:  Video Visit   Originating Location (pt. Location): Home    Distant Location (provider location):  On-site  Platform used for Video Visit: Kiesha  Signed Electronically by: Caryl Mathias DO

## 2025-03-19 NOTE — ED TRIAGE NOTES
"  Pt having acute worsening of the \"electrical waves\" that run from upper abdomen to her face. Visible right hemispasm (slow tic) that comes on regular intervals. She states this has been worse since 5-6 days ago       "

## 2025-03-19 NOTE — TELEPHONE ENCOUNTER
"Pt called clinic stating she's experiencing \"convulsion all over my body\", referring to muscle spasms.   Denies dyspnea and/or chest pain.   Huddled with Dr. Mathias who advised pt to report to the ER for evaluation and treatment.   Pt stated she will call her  to take her to ER as recommended.   Pt was A&Ox4 at the time the call ended.     Rosalie SYED RN    "

## 2025-03-19 NOTE — ED PROVIDER NOTES
"  Emergency Department Note      History of Present Illness     Chief Complaint   Spasms    HPI   Alejandra Forbes is a 45 year old adult with a history of multiple sclerosis, diabetes mellitus, hypertension, bipolar II disorder, asthma, PCOS, and anxiety attacks presenting with spasms. April reports that for the past 5-6 days she has been experiencing \"surges of energy\" that originate in her abdomen and travel up to the right side of her face and \"through her skull\". She reports that the twitching in her face has gotten worse and is having consistent hemispasms. She reports that this has been an consistent issue with her MS but it has never been this bad. The patient reports that it is often exasperated by stress and sitting down for long periods of time which she has been doing a lot of lately because she is studying for her doctorate. She endorses vomiting this morning due to her ozempic. She denies urinary symptoms, new cough, diarrhea, shortness of breath, chest pains, or any previous history of seizures. The patient reports no chance of pregnancy.  The patient reports taking 300 mg of gabapentin 3x a day and 1500 mg of keppra 2x a day. She took one keppra and two gabapentin today. The patient also recently switched from calpaxone to teriflunomid.     Independent Historian   None    Review of External Notes   3/18/25 - clinic note from [facility]    Patient called clinic with increased episodes of shaking in stomach, legs, and head, which was impacting her ability to read write and concentrated. Therefore making if difficult for her to study for her upcoming doctoral exams.     3/19/25 - virtual visit note [facility] [place]   Patient reports worsening electrical wave right hemispasm tick which has been worsening the past 5-6 days. Was reccommended that she increase her gabapentin does 300 mg tid to 400 mg tid. It was discussed that if it was due to am MS flare, prednisone could be used, but " "the patient was concerned if was her diabetes. Blood glucose and A1C all look good. She was ultimately referred to go to the ED.        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   Intermittent asthma  Mild intermittent asthma  Morbid obesity   MS (multiple sclerosis)   Pars planitis  PCOS (polycystic ovarian syndrome)  Polycystic ovaries  Posterior subcapsular polar cataract, nonsenile  Steroid responders  Uveitis    Medications   Albuterol   Lipitor   Gabapentin   Keppra  Zestril   Glucophage   Ozempic   Aubagio       Surgical History   Bilateral cataract removal   Glaucoma surgery       Physical Exam     Patient Vitals for the past 24 hrs:   BP Temp Temp src Pulse Resp SpO2 Height Weight   03/19/25 2244 (!) 177/131 -- -- 83 -- 99 % -- --   03/19/25 2000 -- -- -- -- -- 100 % -- --   03/19/25 1915 -- -- -- -- -- 100 % -- --   03/19/25 1900 -- -- -- -- -- 98 % -- --   03/19/25 1845 -- -- -- -- -- 96 % -- --   03/19/25 1830 (!) 135/105 -- -- 92 -- 96 % -- --   03/19/25 1826 -- -- -- -- -- 93 % -- --   03/19/25 1708 (!) 157/121 (!) 96.4  F (35.8  C) Temporal 105 18 97 % 1.575 m (5' 2\") 94.3 kg (208 lb)     Physical Exam  Nursing note and vitals reviewed.  Constitutional:  Appears well-developed and well-nourished.   HENT:   Head:    Atraumatic.   Mouth/Throat:   Oropharynx is clear and moist. No oropharyngeal exudate.   Eyes:    Pupils are equal, round, and reactive to light.   Neck:    Normal range of motion. Neck supple.      No tracheal deviation present. No thyromegaly present.   Cardiovascular:  Normal rate, regular rhythm, no murmur   Pulmonary/Chest: Breath sounds are clear and equal without wheezes or crackles.  Abdominal:   Soft. Bowel sounds are " normal. Exhibits no distension and      no mass. There is no tenderness.      There is no rebound and no guarding.   Musculoskeletal:  Exhibits no edema.   Lymphadenopathy:  No cervical adenopathy.   Neurological:   Alert and oriented to person, place, and time. Myoclonic jerking primarily of neck and face which jerks to the right. GCS 15.  CN 2-12 intact.  and proximal upper extremity strength strong and equal.  Bilateral lower extremity strength strong and equal, including strong dorsiflexion and plantarflexion strength.  Sensation intact and equal to the face, arms and legs.  No facial droop or weakness. Normal speech.  Follows commands and answers questions normally.    Skin:    Skin is warm and dry. No rash noted. No pallor.      Diagnostics     Lab Results   Labs Ordered and Resulted from Time of ED Arrival to Time of ED Departure   COMPREHENSIVE METABOLIC PANEL - Abnormal       Result Value    Sodium 138      Potassium 4.3      Carbon Dioxide (CO2) 25      Anion Gap 12      Urea Nitrogen 8.4      Creatinine 0.60      GFR Estimate >90      Calcium 9.2      Chloride 101      Glucose 101 (*)     Alkaline Phosphatase 95      AST 25      ALT 14      Protein Total 7.0      Albumin 3.9      Bilirubin Total 0.3     ROUTINE UA WITH MICROSCOPIC REFLEX TO CULTURE - Abnormal    Color Urine Light Yellow      Appearance Urine Clear      Glucose Urine Negative      Bilirubin Urine Negative      Ketones Urine 10 (*)     Specific Gravity Urine 1.009      Blood Urine Moderate (*)     pH Urine 6.0      Protein Albumin Urine Negative      Urobilinogen Urine Normal      Nitrite Urine Negative      Leukocyte Esterase Urine Negative      Mucus Urine Present (*)     RBC Urine 34 (*)     WBC Urine 2      Squamous Epithelials Urine 2 (*)    TSH WITH FREE T4 REFLEX - Normal    TSH 1.05     LIPASE - Normal    Lipase 19     MAGNESIUM - Normal    Magnesium 2.1     KEPPRA (LEVETIRACETAM) LEVEL   CBC WITH PLATELETS AND DIFFERENTIAL        Imaging   No orders to display       EKG   ECG results from 03/19/25   EKG 12-lead, tracing only     Value    Systolic Blood Pressure     Diastolic Blood Pressure     Ventricular Rate 94    Atrial Rate 94    ME Interval 130    QRS Duration 72        QTc 467    P Axis 29    R AXIS 17    T Axis 20    Interpretation ECG      Sinus rhythm with Premature atrial complexes with Aberrant conduction  Otherwise normal ECG  No previous ECGs available    Interpreted by me at 1811           Independent Interpretation   None    ED Course      Medications Administered   Medications   sodium chloride 0.9% BOLUS 1,000 mL (0 mLs Intravenous Stopped 3/19/25 1853)   diazepam (VALIUM) injection 5 mg (5 mg Intravenous $Given 3/19/25 1817)       Procedures   Procedures     Discussion of Management   None    ED Course   ED Course as of 03/19/25 2304   Wed Mar 19, 2025   1745 I obtained the history and examined the patient as noted above.      1823 I spoke with Dr. Sheffield, neurology, regarding the patient's history and presentation in the ED today     1908 I rechecked and updated the patient. Discussed doing an MRI       Additional Documentation  None    Medical Decision Making / Diagnosis     CMS Diagnoses: None    MIPS       None    [facility]   April FRANCIS Forbes is a 45 year old adult with a history of multiple sclerosis and anxiety who arrives to the emergency department due to significant exacerbation of her ongoing intermittent myoclonic jerking.  The patient has significant intermittent visible myoclonic jerking of her right face which resolved after IV Valium.  I consulted her multiple sclerosis neurologist who did not feel that emergent MRI of her brain was indicated tonight since he does not feel that multiple sclerosis exacerbation is likely the cause, however he did support    Disposition   The patient was discharged.     Diagnosis     ICD-10-CM    1. Myoclonic jerking  G25.3       2. Multiple sclerosis (H)  G35             Discharge Medications   Current Discharge Medication List            Scribe Disclosure:  I, Bianca Mendez, am serving as a scribe at 6:10 PM on 3/19/2025 to document services personally performed by Lisy Mtz MD based on my observations and the provider's statements to me.

## 2025-03-19 NOTE — TELEPHONE ENCOUNTER
Pt seen today for VV with PN.   Closing this encounter, as it was addressed at visit today with other provider.     Quyen WATTS RN

## 2025-03-20 LAB
ATRIAL RATE - MUSE: 94 BPM
DIASTOLIC BLOOD PRESSURE - MUSE: NORMAL MMHG
INTERPRETATION ECG - MUSE: NORMAL
LEVETIRACETAM SERPL-MCNC: 16.4 ÂΜG/ML (ref 10–40)
P AXIS - MUSE: 29 DEGREES
PR INTERVAL - MUSE: 130 MS
QRS DURATION - MUSE: 72 MS
QT - MUSE: 374 MS
QTC - MUSE: 467 MS
R AXIS - MUSE: 17 DEGREES
SYSTOLIC BLOOD PRESSURE - MUSE: NORMAL MMHG
T AXIS - MUSE: 20 DEGREES
VENTRICULAR RATE- MUSE: 94 BPM

## 2025-03-20 NOTE — ED NOTES
"Pt unable to remove rings prior to MRI, and refuses to allow staff to cut them. Pt visibly frustrated, stating \"I've gotten multiple MRIs with my rings on.\" MRI consulted- confirmed she cannot be scanned with jewelry on. MD notified.   "

## 2025-03-20 NOTE — DISCHARGE INSTRUCTIONS
Do not drive a car or drink alcohol for 12 hours after taking the Valium.    If you feel sleepy on the whole tablet of valium, you could cut the tablet in half.    Opioid Medication Information    You have been given a prescription for an opioid (narcotic) pain medicine and/or have received a pain medicine while here in the Emergency Department. These medicines can make you drowsy or impaired. You must not drive, operate dangerous equipment, or engage in any other dangerous activities while taking these medications. If you drive while taking these medications, you could be arrested for DUI, or driving under the influence. Do not drink any alcohol while you are taking these medications.   Opioid pain medications can cause addiction. If you have a history of chemical dependency of any type, you are at a higher risk of becoming addicted to pain medications.  Only take these prescribed medications to treat your pain when all other options have been tried. Take it for as short a time and as few doses as possible. Store your pain pills in a secure place, as they are frequently stolen and provide a dangerous opportunity for children or visitors in your house to start abusing these powerful medications. We will not replace any lost or stolen medicine.  As soon as your pain is better, you should flush all your remaining medication.   Many prescription pain medications contain Tylenol  (acetaminophen), including Vicodin , Tylenol #3 , Norco , Lortab , and Percocet .  You should not take any extra pills of Tylenol  if you are using these prescription medications or you can get very sick.  Do not ever take more than 4000 mg of acetaminophen in any 24 hour period.  All opioids tend to cause constipation. Drink plenty of water and eat foods that have a lot of fiber, such as fruits, vegetables, prune juice, apple juice and high fiber cereal.  Take a laxative if you don t move your bowels at least every other day. Miralax , Milk of  Magnesia, Colace , or Senna  can be used to keep you regular.

## 2025-04-10 ENCOUNTER — TELEPHONE (OUTPATIENT)
Dept: PHARMACY | Facility: CLINIC | Age: 45
End: 2025-04-10
Payer: COMMERCIAL

## 2025-04-10 NOTE — TELEPHONE ENCOUNTER
We have been unable to reach this patient for MTM follow-up after several attempts. We will stop reaching out to the patient at this time. Please let us know if we can assist in this patient's care in the future. Routing to PCP as AURELIA.     Macey Sanchez, PharmD  Medication Therapy Management Resident  Damien NobleD, [place], BCACP   Medication Therapy Management Pharmacist

## 2025-04-29 ENCOUNTER — MYC MEDICAL ADVICE (OUTPATIENT)
Dept: NEUROLOGY | Facility: CLINIC | Age: 45
End: 2025-04-29
Payer: COMMERCIAL

## 2025-04-29 ENCOUNTER — DOCUMENTATION ONLY (OUTPATIENT)
Dept: NEUROLOGY | Facility: CLINIC | Age: 45
End: 2025-04-29
Payer: COMMERCIAL

## 2025-04-29 NOTE — PROGRESS NOTES
FMAL forms have been signed, forms have been scanned into patient's chart. Original forms are being mailed out to patient's home address, patient is aware of this.   Guanakito Warren EMT April 29, 2025

## 2025-05-09 ENCOUNTER — ANCILLARY PROCEDURE (OUTPATIENT)
Dept: MAMMOGRAPHY | Facility: CLINIC | Age: 45
End: 2025-05-09
Attending: FAMILY MEDICINE
Payer: COMMERCIAL

## 2025-05-09 DIAGNOSIS — Z12.31 ENCOUNTER FOR SCREENING MAMMOGRAM FOR MALIGNANT NEOPLASM OF BREAST: ICD-10-CM

## 2025-05-09 PROCEDURE — 77063 BREAST TOMOSYNTHESIS BI: CPT | Mod: TC | Performed by: RADIOLOGY

## 2025-05-09 PROCEDURE — 77067 SCR MAMMO BI INCL CAD: CPT | Mod: TC | Performed by: RADIOLOGY

## 2025-05-20 ENCOUNTER — TELEPHONE (OUTPATIENT)
Dept: NEUROLOGY | Facility: CLINIC | Age: 45
End: 2025-05-20
Payer: COMMERCIAL

## 2025-05-20 NOTE — TELEPHONE ENCOUNTER
PA Initiation    Medication: TERIFLUNOMIDE 14 MG PO TABS  Insurance Company: [facility] Specialty Prior Auth Dept, phone  [phone], Fax [phone]  [place] [facility] [place] [address]  Filling Pharmacy Phone:    Filling Pharmacy Fax:    Start Date: 5/20/2025          Thank you,    Vickie Lane [facility]  Specialty Pharmacy Clinic Liaison - CardiologyNeurologyMultiple Sclerosis  [facility] [address] [address]  Ph: [phone] Fax: [phone]  [email or web address]

## 2025-05-21 NOTE — TELEPHONE ENCOUNTER
Prior Authorization Approval    Medication: TERIFLUNOMIDE 14 MG PO TABS  Authorization Effective Date: 5/20/2025  Authorization Expiration Date: 5/20/2026  Approved Dose/Quantity: 30 days  Reference #:     Insurance Company: [facility] Prior Auth Dept, phone  [phone], Fax [phone]  Expected CoPay: $    CoPay Card Available:      Financial Assistance Needed:   Which [facility] is filling the [facility]  Pharmacy Notified: Yes  Patient Notified: Yes        Thank you,    Vickie Lane [facility]  Specialty Pharmacy Clinic Liaison - CardiologyNeurologyMultiple Sclerosis  [facility]  [address] [place] [id number]  Ph: [phone] Fax: [phone]  [email or web address]

## 2025-05-25 DIAGNOSIS — E11.65 TYPE 2 DIABETES MELLITUS WITH HYPERGLYCEMIA, WITHOUT LONG-TERM CURRENT USE OF INSULIN (H): ICD-10-CM

## 2025-05-26 DIAGNOSIS — E11.65 TYPE 2 DIABETES MELLITUS WITH HYPERGLYCEMIA, WITHOUT LONG-TERM CURRENT USE OF INSULIN (H): ICD-10-CM

## 2025-05-26 DIAGNOSIS — R20.2 PARESTHESIAS: ICD-10-CM

## 2025-05-26 DIAGNOSIS — G35 MULTIPLE SCLEROSIS (H): ICD-10-CM

## 2025-05-26 DIAGNOSIS — G51.31 HEMIFACIAL SPASM OF RIGHT SIDE OF FACE: ICD-10-CM

## 2025-05-27 RX ORDER — LEVETIRACETAM 1000 MG/1
1500 TABLET ORAL 2 TIMES DAILY
Qty: 270 TABLET | Refills: 3 | Status: SHIPPED | OUTPATIENT
Start: 2025-05-27

## 2025-05-27 RX ORDER — INSULIN GLARGINE 100 [IU]/ML
INJECTION, SOLUTION SUBCUTANEOUS
Qty: 15 ML | Refills: 0 | Status: SHIPPED | OUTPATIENT
Start: 2025-05-27

## 2025-05-27 RX ORDER — LISINOPRIL 2.5 MG/1
2.5 TABLET ORAL DAILY
Qty: 90 TABLET | Refills: 0 | Status: SHIPPED | OUTPATIENT
Start: 2025-05-27

## 2025-05-27 RX ORDER — METFORMIN HYDROCHLORIDE 500 MG/1
2000 TABLET, EXTENDED RELEASE ORAL EVERY EVENING
Qty: 360 TABLET | Refills: 1 | Status: SHIPPED | OUTPATIENT
Start: 2025-05-27

## 2025-05-27 RX ORDER — GABAPENTIN 300 MG/1
300 CAPSULE ORAL 3 TIMES DAILY
Qty: 90 CAPSULE | Refills: 11 | Status: SHIPPED | OUTPATIENT
Start: 2025-05-27

## 2025-05-27 NOTE — TELEPHONE ENCOUNTER
Clinic RN: Please investigate patient's chart or contact patient if the information cannot be found because the last prescription was a taper dose (not in RN protocol). We need to know what dose patient is taking. Determine the current dose, then route to provider and ask provider to update the SIG and approve or deny the prescription.    Thank you  MICHAEL Campos

## 2025-05-27 NOTE — TELEPHONE ENCOUNTER
Pt clarified that she is taking 400 mg at night, and an additional 400 mg tablet if needed during the day. PCP had provided a one month rx using 400 mg tablet. Previous rx by Dr Sheffield was for 300 mg TID.     Dr Sheffield, do you want to renew the 400 mg rx?     Rebeka Bauman RN

## 2025-05-27 NOTE — TELEPHONE ENCOUNTER
Received refill request for gabapentin and Keppra from [facility]; Patient was last seen in Dec 2024 and has follow up appointment in June 2025 with Dr Sheffield. Keppra refilled per [facility] refill protocol and message sent to April to clarify gabapentin dose.     Rebeka Bauman RN

## 2025-05-28 NOTE — TELEPHONE ENCOUNTER
Uncontrolled Blood pressure in emergency department- need ov or vV for Blood pressure and medications management.

## 2025-05-28 NOTE — TELEPHONE ENCOUNTER
Called patient and left non-detailed voicemail to call clinic back at [phone].   [employer] message sent.   Will do second outreach in 3 days.    Doris MORTON

## 2025-05-28 NOTE — TELEPHONE ENCOUNTER
Left message for patient to call [facility] back  When patient calls back please transfer to an RN  Thanks,  Radha MENON RN

## 2025-05-29 RX ORDER — SEMAGLUTIDE 1.34 MG/ML
INJECTION, SOLUTION SUBCUTANEOUS
Qty: 9 ML | Refills: 1 | Status: SHIPPED | OUTPATIENT
Start: 2025-05-29

## 2025-05-29 NOTE — TELEPHONE ENCOUNTER
I spoke with patient and advised office visit.  Refill for GLP1 is sent      Lab Results   Component Value Date    A1C 6.5 12/10/2024    A1C 7.1 07/19/2024    A1C 6.9 11/07/2023    A1C 7.1 03/14/2023    A1C 6.6 06/20/2022    A1C 6.2 07/18/2019    A1C 6.1 10/01/2018    A1C 6.5 05/04/2018    A1C 7.1 01/15/2018    A1C 7.3 06/08/2015

## 2025-06-03 ENCOUNTER — OFFICE VISIT (OUTPATIENT)
Dept: NEUROLOGY | Facility: CLINIC | Age: 45
End: 2025-06-03
Attending: PSYCHIATRY & NEUROLOGY
Payer: COMMERCIAL

## 2025-06-03 VITALS
WEIGHT: 213.5 LBS | OXYGEN SATURATION: 99 % | SYSTOLIC BLOOD PRESSURE: 138 MMHG | BODY MASS INDEX: 39.29 KG/M2 | HEIGHT: 62 IN | HEART RATE: 89 BPM | DIASTOLIC BLOOD PRESSURE: 96 MMHG

## 2025-06-03 DIAGNOSIS — R20.2 PARESTHESIA: ICD-10-CM

## 2025-06-03 DIAGNOSIS — G35 MS (MULTIPLE SCLEROSIS) (H): Primary | ICD-10-CM

## 2025-06-03 DIAGNOSIS — G51.31 HEMIFACIAL SPASM OF RIGHT SIDE OF FACE: ICD-10-CM

## 2025-06-03 PROCEDURE — G2211 COMPLEX E/M VISIT ADD ON: HCPCS | Performed by: PSYCHIATRY & NEUROLOGY

## 2025-06-03 PROCEDURE — 99214 OFFICE O/P EST MOD 30 MIN: CPT | Performed by: PSYCHIATRY & NEUROLOGY

## 2025-06-03 PROCEDURE — 99215 OFFICE O/P EST HI 40 MIN: CPT | Performed by: PSYCHIATRY & NEUROLOGY

## 2025-06-03 ASSESSMENT — PAIN SCALES - GENERAL: PAINLEVEL_OUTOF10: MILD PAIN (2)

## 2025-06-03 NOTE — NURSING NOTE
Chief Complaint   Patient presents with    MS    RECHECK     6 month follow up      Vitals were taken and medications were reconciled.  Guanakito Warren, EMT  3:20 PM

## 2025-06-03 NOTE — Clinical Note
"6/3/2025       RE: Alejandra Forbes  [address] [facility] [address]  [place] [address]     Dear Colleague,    Thank you for referring your patient, Alejandra Forbes, to the [facility] [place] at [facility]. Please see a copy of my visit note below.    ID: Alejandra Forbes is a 45-year-old woman who I follow for multiple sclerosis.  She returns for routine follow-up.  I last saw her 6 months ago.    HPI: April was seen in the ED in March after she developed intermittent jerking/spasms in her right face and neck.  The emergency department physician called and described what she was seeing, it sounded like myoclonus/hemifacial spasm.  She was already on levetiracetam.  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  She was already on levetiracetam, which I had prescribed for her unusual sensory symptoms (an \"energy surge\" or discomfort that travels from her gut to her scalp, or vice versa), as I thought these might be paroxysmal sensory symptoms.  I added some diazepam and she has found that helpful both for the hemifacial spasm and for the \"electricity in my body\".  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  She continues on teriflunomide and tolerates that well.    She has a long history of pars planitis.  She had brain MRI screening because of that which showed lesions consistent with MS including enhancing lesions.  CSF was positive for intrathecal IgG synthesis.  When she had lesion accumulation she was started on glatiramer acetate, switched to Rebif but had side effects with that and stopped in 2019, then had more lesion accumulation and restarted glatiramer, did not tolerate that well and was switched to teriflunomide last year.  She has never had a clear MS relapse as " best I can tell.    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    Steroid responders     Uveitis         Current Outpatient Medications:     albuterol (PROAIR HFA/PROVENTIL HFA/VENTOLIN HFA) 108 (90 Base) MCG/ACT inhaler, Inhale 2 puffs into the lungs every 6 hours as needed for shortness of breath / dyspnea or wheezing, Disp: 6.7 g, Rfl: 11    atorvastatin (LIPITOR) 20 MG tablet, Take 1 tablet (20 mg) by mouth daily., Disp: 90 tablet, Rfl: 1    Continuous Glucose Sensor (FREESTYLE GAMA 2 SENSOR) MISC, CHANGE EVERY 14 DAYS, Disp: 1 each, Rfl: 5    CONTOUR NEXT TEST test strip, USE TO TEST BLOOD SUGAR 3 TIMES DAILY OR AS DIRECTED., Disp: 100 strip, Rfl: 4    diazepam (VALIUM) 10 MG tablet, Take 1 tablet (10 mg) by mouth every 8 hours as needed for anxiety (myoclonus)., Disp: 40 tablet, Rfl: 5    diazepam (VALIUM) 5 MG tablet, Take 1 tablet (5 mg) by mouth every 12 hours as needed for muscle spasms (myoclonic  jerks/facial twitching). No driving a car or drinking alcohol for 12 hours after taking this medication., Disp: 20 tablet, Rfl: 0    erythromycin (ROMYCIN) 5 MG/GM ophthalmic ointment, Place 0.5 inches Into the left eye at bedtime, Disp: 3.5 g, Rfl: 2    famotidine (PEPCID) 20 MG tablet, Take 1 tablet (20 mg) by mouth daily., Disp: , Rfl:     gabapentin (NEURONTIN) 300 MG capsule, TAKE 1 CAPSULE BY MOUTH THREE TIMES A DAY, Disp: 90 capsule, Rfl: 11    gabapentin 400 MG TABS, Take 400 mg by mouth 3 times daily., Disp: 90 tablet, Rfl: 0    ibuprofen (ADVIL,MOTRIN) 200 MG tablet, Take 2-3 tablets by mouth as needed, Disp: , Rfl:     insulin glargine (LANTUS SOLOSTAR) 100 UNIT/ML pen, INJECT 15 UNITS SUBCUTANEOUSLY AT BEDTIME, Disp: 15 mL, Rfl: 0    insulin pen needle (BD LAVONNE U/F) 32G X 4 MM miscellaneous, Use once daily as directed., Disp: 100 each, Rfl: 0    levETIRAcetam (KEPPRA) 1000 MG tablet, TAKE 1.5 TABLETS (1,500 MG) BY MOUTH 2 TIMES DAILY, Disp: 270 tablet, Rfl: 3    lisinopril (ZESTRIL) 2.5 MG tablet, TAKE 1 TABLET BY MOUTH EVERY DAY, Disp: 90 tablet, Rfl: 0    loratadine (CLARITIN) 10 MG tablet, Take 10 mg by mouth daily., Disp: , Rfl:     metFORMIN (GLUCOPHAGE XR) 500 MG 24 hr tablet, TAKE 4 TABLETS (2,000 MG) BY MOUTH EVERY EVENING., Disp: 360 tablet, Rfl: 1    norgestrel-ethinyl estradiol (LOW-OGESTREL) 0.3-30 MG-MCG tablet, Take 1 tablet by mouth every evening. TAKE 1 TABLET BY MOUTH EVERY DAY., Disp: 84 tablet, Rfl: 3    OZEMPIC, 1 MG/DOSE, 4 MG/3ML pen, INJECT 1 MG SUBCUTANEOUSLY EVERY 7 DAYS, Disp: 9 mL, Rfl: 1    teriflunomide (AUBAGIO) 14 MG tablet, TAKE 1 TABLET BY MOUTH 1 TIME A DAY, Disp: 30 tablet, Rfl: 5    VITAMIN D PO, Take 1 capsule by mouth daily, Disp: , Rfl:   Exam: She is alert and oriented.  Affect is bright and language functions are normal.  Cranial nerves are notable for very mild left ptosis and intermittent right hemifacial spasm.  Muscle bulk tone strength and dexterity are  normal in the arms and legs.  Light touch is intact in all 4 limbs.  Reflexes are 2/4 and symmetric at the biceps 1/4 and symmetric at the knees.  Gait is narrow-based and stable with normal tandem walk and negative Romberg.    We reviewed her brain MRI from last week and compared it to the prior exam from 1 year ago.  It shows a stable, moderate burden of typical white matter lesions, no new or enhancing lesions.    Impression: Multiple sclerosis (RIS plus accumulation of typical white matter lesions), radiologically stable on teriflunomide.  Hemifacial spasm.  I spent 45 minutes on her care on the date of service including chart review and face-to-face time.  We discussed the hemifacial spasm.  I told her treatment with that is mainly botulinum toxin chemodenervation.  After discussion she felt like she was not quite ready to start that as it was not bothersome enough at this point.  Discussed safety monitoring with teriflunomide, she had labs done in March and transaminases were fine.  Discussed MRI surveillance, we will gradually extend the interval between surveillance scans.    The longitudinal plan of care for the diagnosis(es)/condition(s) as documented were addressed during this visit. Due to the added complexity in care, I will continue to support April in the subsequent management and with ongoing continuity of care.    Plan: Follow-up in 6 months, repeat brain MRI in 1 year.    This note was completed in part using voice-recognition software, and some typographic errors may be present as a result.         Again, thank you for allowing me to participate in the care of your patient.      Sincerely,    Fredo Sheffield MD

## 2025-06-09 NOTE — PROGRESS NOTES
"ID: Alejandra Forbes is a 45-year-old woman who I follow for multiple sclerosis.  She returns for routine follow-up.  I last saw her 6 months ago.    HPI: April was seen in the ED in March after she developed intermittent jerking/spasms in her right face and neck.  The emergency department physician called and described what she was seeing, it sounded like myoclonus/hemifacial spasm.  She was already on levetiracetam.  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  She was already on levetiracetam, which I had prescribed for her unusual sensory symptoms (an \"energy surge\" or discomfort that travels from her gut to her scalp, or vice versa), as I thought these might be paroxysmal sensory symptoms.  I added some diazepam and she has found that helpful both for the hemifacial spasm and for the \"electricity in my body\".  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  She continues on teriflunomide and tolerates that well.    She has a long history of pars planitis.  She had brain MRI screening because of that which showed lesions consistent with MS including enhancing lesions.  CSF was positive for intrathecal IgG synthesis.  When she had lesion accumulation she was started on glatiramer acetate, switched to Rebif but had side effects with that and stopped in 2019, then had more lesion accumulation and restarted glatiramer, did not tolerate that well and was switched to teriflunomide last year.  She has never had a clear MS relapse as best I can tell.    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    Steroid responders     Uveitis         Current Outpatient Medications:     albuterol (PROAIR HFA/PROVENTIL HFA/VENTOLIN HFA) 108 (90 Base) MCG/ACT inhaler, Inhale 2 puffs into the lungs every 6 hours as needed for shortness of breath / dyspnea or wheezing, Disp: 6.7 g, Rfl: 11    atorvastatin (LIPITOR) 20 MG tablet, Take 1 tablet (20 mg) by mouth daily., Disp: 90 tablet, Rfl: 1    Continuous Glucose Sensor (FREESTYLE GAMA 2 SENSOR) MISC, CHANGE EVERY 14 DAYS, Disp: 1 each, Rfl: 5    CONTOUR NEXT TEST test strip, USE TO TEST BLOOD SUGAR 3 TIMES DAILY OR AS DIRECTED., Disp: 100 strip, Rfl: 4    diazepam (VALIUM) 10 MG tablet, Take 1 tablet (10 mg) by mouth every 8 hours as needed for anxiety (myoclonus)., Disp: 40 tablet, Rfl: 5    diazepam (VALIUM) 5 MG tablet, Take 1 tablet (5 mg) by mouth every 12 hours as needed for muscle spasms (myoclonic jerks/facial twitching). No driving a car or drinking alcohol for 12 hours after taking this medication., Disp: 20 tablet, Rfl: 0    erythromycin (ROMYCIN) 5 MG/GM ophthalmic ointment, Place 0.5 inches Into the left eye at bedtime, Disp: 3.5 g, Rfl: 2    famotidine (PEPCID) 20 MG tablet, Take 1 tablet (20 mg) by mouth daily.,  Disp: , Rfl:     gabapentin (NEURONTIN) 300 MG capsule, TAKE 1 CAPSULE BY MOUTH THREE TIMES A DAY, Disp: 90 capsule, Rfl: 11    gabapentin 400 MG TABS, Take 400 mg by mouth 3 times daily., Disp: 90 tablet, Rfl: 0    ibuprofen (ADVIL,MOTRIN) 200 MG tablet, Take 2-3 tablets by mouth as needed, Disp: , Rfl:     insulin glargine (LANTUS SOLOSTAR) 100 UNIT/ML pen, INJECT 15 UNITS SUBCUTANEOUSLY AT BEDTIME, Disp: 15 mL, Rfl: 0    insulin pen needle (BD LAVONNE U/F) 32G X 4 MM miscellaneous, Use once daily as directed., Disp: 100 each, Rfl: 0    levETIRAcetam (KEPPRA) 1000 MG tablet, TAKE 1.5 TABLETS (1,500 MG) BY MOUTH 2 TIMES DAILY, Disp: 270 tablet, Rfl: 3    lisinopril (ZESTRIL) 2.5 MG tablet, TAKE 1 TABLET BY MOUTH EVERY DAY, Disp: 90 tablet, Rfl: 0    loratadine (CLARITIN) 10 MG tablet, Take 10 mg by mouth daily., Disp: , Rfl:     metFORMIN (GLUCOPHAGE XR) 500 MG 24 hr tablet, TAKE 4 TABLETS (2,000 MG) BY MOUTH EVERY EVENING., Disp: 360 tablet, Rfl: 1    norgestrel-ethinyl estradiol (LOW-OGESTREL) 0.3-30 MG-MCG tablet, Take 1 tablet by mouth every evening. TAKE 1 TABLET BY MOUTH EVERY DAY., Disp: 84 tablet, Rfl: 3    OZEMPIC, 1 MG/DOSE, 4 MG/3ML pen, INJECT 1 MG SUBCUTANEOUSLY EVERY 7 DAYS, Disp: 9 mL, Rfl: 1    teriflunomide (AUBAGIO) 14 MG tablet, TAKE 1 TABLET BY MOUTH 1 TIME A DAY, Disp: 30 tablet, Rfl: 5    VITAMIN D PO, Take 1 capsule by mouth daily, Disp: , Rfl:   Exam: She is alert and oriented.  Affect is bright and language functions are normal.  Cranial nerves are notable for very mild left ptosis and intermittent right hemifacial spasm.  Muscle bulk tone strength and dexterity are normal in the arms and legs.  Light touch is intact in all 4 limbs.  Reflexes are 2/4 and symmetric at the biceps 1/4 and symmetric at the knees.  Gait is narrow-based and stable with normal tandem walk and negative Romberg.    We reviewed her brain MRI from last week and compared it to the prior exam from 1 year ago.  It shows a  stable, moderate burden of typical white matter lesions, no new or enhancing lesions.    Impression: Multiple sclerosis (RIS plus accumulation of typical white matter lesions), radiologically stable on teriflunomide.  Hemifacial spasm.  I spent 45 minutes on her care on the date of service including chart review and face-to-face time.  We discussed the hemifacial spasm.  I told her treatment with that is mainly botulinum toxin chemodenervation.  After discussion she felt like she was not quite ready to start that as it was not bothersome enough at this point.  Discussed safety monitoring with teriflunomide, she had labs done in March and transaminases were fine.  Discussed MRI surveillance, we will gradually extend the interval between surveillance scans.    The longitudinal plan of care for the diagnosis(es)/condition(s) as documented were addressed during this visit. Due to the added complexity in care, I will continue to support April in the subsequent management and with ongoing continuity of care.    Plan: Follow-up in 6 months, repeat brain MRI in 1 year.    This note was completed in part using voice-recognition software, and some typographic errors may be present as a result.

## 2025-06-21 ENCOUNTER — HEALTH MAINTENANCE LETTER (OUTPATIENT)
Age: 45
End: 2025-06-21

## 2025-08-17 DIAGNOSIS — E11.65 TYPE 2 DIABETES MELLITUS WITH HYPERGLYCEMIA, WITHOUT LONG-TERM CURRENT USE OF INSULIN (H): ICD-10-CM

## 2025-08-18 RX ORDER — INSULIN GLARGINE 100 [IU]/ML
INJECTION, SOLUTION SUBCUTANEOUS
Qty: 3 ML | Refills: 1 | Status: SHIPPED | OUTPATIENT
Start: 2025-08-18

## 2025-08-24 DIAGNOSIS — E11.65 TYPE 2 DIABETES MELLITUS WITH HYPERGLYCEMIA, WITHOUT LONG-TERM CURRENT USE OF INSULIN (H): ICD-10-CM

## 2025-08-25 RX ORDER — LISINOPRIL 2.5 MG/1
2.5 TABLET ORAL DAILY
Qty: 90 TABLET | Refills: 0 | Status: SHIPPED | OUTPATIENT
Start: 2025-08-25

## (undated) DEVICE — SYR 05ML LL W/O NDL

## (undated) DEVICE — APPLICATOR COTTON TIP 3" PKG OF 10 34831010

## (undated) DEVICE — EYE NDL RETROBULBAR ATKINSON 25GA 1.5" 581637

## (undated) DEVICE — GLOVE PROTEXIS MICRO 7.0  2D73PM70

## (undated) DEVICE — EYE PREP BETADINE 5% SOLUTION 30ML 0065-0411-30

## (undated) DEVICE — APPLICATORS COTTON TIP 6"X2 STERILE LF C15053-006

## (undated) DEVICE — LINEN TOWEL PACK X5 5464

## (undated) DEVICE — TAPE MICROPORE 1"X1.5YD 1530S-1

## (undated) DEVICE — EYE DRSG PAD OVAL

## (undated) RX ORDER — OXYCODONE HYDROCHLORIDE 5 MG/1
TABLET ORAL
Status: DISPENSED
Start: 2023-12-27

## (undated) RX ORDER — ACETAMINOPHEN 325 MG/1
TABLET ORAL
Status: DISPENSED
Start: 2023-12-27

## (undated) RX ORDER — FENTANYL CITRATE 50 UG/ML
INJECTION, SOLUTION INTRAMUSCULAR; INTRAVENOUS
Status: DISPENSED
Start: 2023-12-27

## (undated) RX ORDER — ONDANSETRON 2 MG/ML
INJECTION INTRAMUSCULAR; INTRAVENOUS
Status: DISPENSED
Start: 2023-12-27

## (undated) RX ORDER — DEXAMETHASONE SODIUM PHOSPHATE 4 MG/ML
INJECTION, SOLUTION INTRA-ARTICULAR; INTRALESIONAL; INTRAMUSCULAR; INTRAVENOUS; SOFT TISSUE
Status: DISPENSED
Start: 2023-12-27